# Patient Record
Sex: MALE | Race: WHITE | Employment: FULL TIME | URBAN - METROPOLITAN AREA
[De-identification: names, ages, dates, MRNs, and addresses within clinical notes are randomized per-mention and may not be internally consistent; named-entity substitution may affect disease eponyms.]

---

## 2019-04-29 ENCOUNTER — HOSPITAL ENCOUNTER (OUTPATIENT)
Dept: LAB | Age: 59
Discharge: HOME | End: 2019-04-29

## 2019-04-29 LAB — XX-LABCORP SPECIMEN COL,LCBCF: NORMAL

## 2019-04-29 PROCEDURE — 99001 SPECIMEN HANDLING PT-LAB: CPT

## 2019-05-05 ENCOUNTER — HOSPITAL ENCOUNTER (OUTPATIENT)
Dept: LAB | Age: 59
Discharge: HOME | End: 2019-05-05
Payer: COMMERCIAL

## 2019-05-05 LAB
ANION GAP SERPL CALC-SCNC: 10 MMOL/L (ref 3–18)
BASOPHILS # BLD: 0 K/UL (ref 0–0.1)
BASOPHILS NFR BLD: 1 % (ref 0–2)
BUN SERPL-MCNC: 17 MG/DL (ref 7–18)
BUN/CREAT SERPL: 14 (ref 12–20)
CALCIUM SERPL-MCNC: 8.8 MG/DL (ref 8.5–10.1)
CHLORIDE SERPL-SCNC: 105 MMOL/L (ref 100–108)
CO2 SERPL-SCNC: 24 MMOL/L (ref 21–32)
CREAT SERPL-MCNC: 1.22 MG/DL (ref 0.6–1.3)
DIFFERENTIAL METHOD BLD: ABNORMAL
EOSINOPHIL # BLD: 0.3 K/UL (ref 0–0.4)
EOSINOPHIL NFR BLD: 4 % (ref 0–5)
ERYTHROCYTE [DISTWIDTH] IN BLOOD BY AUTOMATED COUNT: 14.7 % (ref 11.6–14.5)
ERYTHROCYTE [SEDIMENTATION RATE] IN BLOOD: 1 MM/HR (ref 0–20)
GLUCOSE SERPL-MCNC: 370 MG/DL (ref 74–99)
HCT VFR BLD AUTO: 41.8 % (ref 36–48)
HGB BLD-MCNC: 13.2 G/DL (ref 13–16)
LYMPHOCYTES # BLD: 1.3 K/UL (ref 0.9–3.6)
LYMPHOCYTES NFR BLD: 16 % (ref 21–52)
MCH RBC QN AUTO: 28.4 PG (ref 24–34)
MCHC RBC AUTO-ENTMCNC: 31.6 G/DL (ref 31–37)
MCV RBC AUTO: 89.9 FL (ref 74–97)
MONOCYTES # BLD: 0.5 K/UL (ref 0.05–1.2)
MONOCYTES NFR BLD: 6 % (ref 3–10)
NEUTS SEG # BLD: 6.2 K/UL (ref 1.8–8)
NEUTS SEG NFR BLD: 73 % (ref 40–73)
PLATELET # BLD AUTO: 212 K/UL (ref 135–420)
PMV BLD AUTO: 11.2 FL (ref 9.2–11.8)
POTASSIUM SERPL-SCNC: 4.1 MMOL/L (ref 3.5–5.5)
RBC # BLD AUTO: 4.65 M/UL (ref 4.7–5.5)
SODIUM SERPL-SCNC: 139 MMOL/L (ref 136–145)
WBC # BLD AUTO: 8.3 K/UL (ref 4.6–13.2)

## 2019-05-05 PROCEDURE — 85652 RBC SED RATE AUTOMATED: CPT

## 2019-05-05 PROCEDURE — 80048 BASIC METABOLIC PNL TOTAL CA: CPT

## 2019-05-05 PROCEDURE — 85025 COMPLETE CBC W/AUTO DIFF WBC: CPT

## 2019-05-06 LAB
FAX TO INFO,FAXT: NORMAL
FAX TO NUMBER,FAXN: NORMAL

## 2019-05-12 ENCOUNTER — HOSPITAL ENCOUNTER (OUTPATIENT)
Dept: LAB | Age: 59
Discharge: HOME | End: 2019-05-12

## 2019-05-12 LAB — XX-LABCORP SPECIMEN COL,LCBCF: NORMAL

## 2019-05-12 PROCEDURE — 99001 SPECIMEN HANDLING PT-LAB: CPT

## 2019-05-26 ENCOUNTER — HOSPITAL ENCOUNTER (OUTPATIENT)
Dept: LAB | Age: 59
Discharge: HOME | End: 2019-05-26
Payer: COMMERCIAL

## 2019-05-26 LAB
ANION GAP SERPL CALC-SCNC: 10 MMOL/L (ref 3–18)
BASOPHILS # BLD: 0 K/UL (ref 0–0.1)
BASOPHILS NFR BLD: 0 % (ref 0–2)
BUN SERPL-MCNC: 24 MG/DL (ref 7–18)
BUN/CREAT SERPL: 19 (ref 12–20)
CALCIUM SERPL-MCNC: 8.7 MG/DL (ref 8.5–10.1)
CHLORIDE SERPL-SCNC: 103 MMOL/L (ref 100–108)
CO2 SERPL-SCNC: 26 MMOL/L (ref 21–32)
CREAT SERPL-MCNC: 1.28 MG/DL (ref 0.6–1.3)
DIFFERENTIAL METHOD BLD: ABNORMAL
EOSINOPHIL # BLD: 0.5 K/UL (ref 0–0.4)
EOSINOPHIL NFR BLD: 6 % (ref 0–5)
ERYTHROCYTE [DISTWIDTH] IN BLOOD BY AUTOMATED COUNT: 14.4 % (ref 11.6–14.5)
ERYTHROCYTE [SEDIMENTATION RATE] IN BLOOD: 8 MM/HR (ref 0–20)
GLUCOSE SERPL-MCNC: 256 MG/DL (ref 74–99)
HCT VFR BLD AUTO: 40.7 % (ref 36–48)
HGB BLD-MCNC: 13 G/DL (ref 13–16)
LYMPHOCYTES # BLD: 1.3 K/UL (ref 0.9–3.6)
LYMPHOCYTES NFR BLD: 16 % (ref 21–52)
MCH RBC QN AUTO: 27.9 PG (ref 24–34)
MCHC RBC AUTO-ENTMCNC: 31.9 G/DL (ref 31–37)
MCV RBC AUTO: 87.3 FL (ref 74–97)
MONOCYTES # BLD: 0.5 K/UL (ref 0.05–1.2)
MONOCYTES NFR BLD: 6 % (ref 3–10)
NEUTS SEG # BLD: 5.9 K/UL (ref 1.8–8)
NEUTS SEG NFR BLD: 72 % (ref 40–73)
PLATELET # BLD AUTO: 264 K/UL (ref 135–420)
PMV BLD AUTO: 10.4 FL (ref 9.2–11.8)
POTASSIUM SERPL-SCNC: 4.3 MMOL/L (ref 3.5–5.5)
RBC # BLD AUTO: 4.66 M/UL (ref 4.7–5.5)
SODIUM SERPL-SCNC: 139 MMOL/L (ref 136–145)
WBC # BLD AUTO: 8.2 K/UL (ref 4.6–13.2)

## 2019-05-26 PROCEDURE — 85652 RBC SED RATE AUTOMATED: CPT

## 2019-05-26 PROCEDURE — 86140 C-REACTIVE PROTEIN: CPT

## 2019-05-26 PROCEDURE — 80048 BASIC METABOLIC PNL TOTAL CA: CPT

## 2019-05-26 PROCEDURE — 85025 COMPLETE CBC W/AUTO DIFF WBC: CPT

## 2019-05-27 LAB
FAX TO INFO,FAXT: NORMAL
FAX TO NUMBER,FAXN: NORMAL

## 2019-05-29 LAB — CRP SERPL-MCNC: 0.3 MG/DL (ref 0–0.3)

## 2019-06-02 ENCOUNTER — HOSPITAL ENCOUNTER (OUTPATIENT)
Dept: LAB | Age: 59
Discharge: HOME | End: 2019-06-02
Payer: COMMERCIAL

## 2019-06-02 PROCEDURE — 80048 BASIC METABOLIC PNL TOTAL CA: CPT

## 2019-06-02 PROCEDURE — 86140 C-REACTIVE PROTEIN: CPT

## 2019-06-02 PROCEDURE — 85025 COMPLETE CBC W/AUTO DIFF WBC: CPT

## 2019-06-02 PROCEDURE — 85652 RBC SED RATE AUTOMATED: CPT

## 2019-06-03 LAB
ANION GAP SERPL CALC-SCNC: 8 MMOL/L (ref 3–18)
BASOPHILS # BLD: 0 K/UL
BASOPHILS NFR BLD: 0 % (ref 0–1)
BUN SERPL-MCNC: 12 MG/DL (ref 7–18)
BUN/CREAT SERPL: 10 (ref 12–20)
CALCIUM SERPL-MCNC: 8.7 MG/DL (ref 8.5–10.1)
CHLORIDE SERPL-SCNC: 106 MMOL/L (ref 100–108)
CO2 SERPL-SCNC: 25 MMOL/L (ref 21–32)
CREAT SERPL-MCNC: 1.17 MG/DL (ref 0.6–1.3)
CRP SERPL-MCNC: <0.3 MG/DL (ref 0–0.3)
EOSINOPHIL # BLD: 0.3 K/UL
EOSINOPHIL NFR BLD: 3 % (ref 0–5)
ERYTHROCYTE [DISTWIDTH] IN BLOOD BY AUTOMATED COUNT: 14.3 % (ref 11.5–14.5)
ERYTHROCYTE [SEDIMENTATION RATE] IN BLOOD: 5 MM/HR (ref 0–20)
GLUCOSE SERPL-MCNC: 168 MG/DL (ref 74–99)
HCT VFR BLD AUTO: 41.7 %
HGB BLD-MCNC: 13.6 G/DL (ref 13–16)
LYMPHOCYTES # BLD: 1.5 K/UL
LYMPHOCYTES NFR BLD: 15 % (ref 12–49)
MCH RBC QN AUTO: 28 PG (ref 25–35)
MCHC RBC AUTO-ENTMCNC: 32.6 G/DL (ref 31–37)
MCV RBC AUTO: 85.8 FL (ref 78–98)
MONOCYTES # BLD: 0.4 K/UL
MONOCYTES NFR BLD: 4 % (ref 5–13)
NEUTS SEG # BLD: 7.6 K/UL
NEUTS SEG NFR BLD: 77 % (ref 42–75)
PLATELET # BLD AUTO: 241 K/UL
PMV BLD AUTO: 11.1 FL (ref 7.4–10.4)
POTASSIUM SERPL-SCNC: 4.4 MMOL/L (ref 3.5–5.5)
RBC # BLD AUTO: 4.86 M/UL
SODIUM SERPL-SCNC: 139 MMOL/L (ref 136–145)
WBC # BLD AUTO: 9.8 K/UL

## 2019-06-04 LAB
FAX TO INFO,FAXT: NORMAL
FAX TO NUMBER,FAXN: NORMAL

## 2019-09-03 ENCOUNTER — HOSPITAL ENCOUNTER (OUTPATIENT)
Dept: GENERAL RADIOLOGY | Age: 59
Discharge: HOME | End: 2019-09-03
Attending: PODIATRIST
Payer: COMMERCIAL

## 2019-09-03 DIAGNOSIS — M86.9 DIABETIC FOOT ULCER WITH OSTEOMYELITIS (HCC): ICD-10-CM

## 2019-09-03 DIAGNOSIS — E11.69 DIABETIC FOOT ULCER WITH OSTEOMYELITIS (HCC): ICD-10-CM

## 2019-09-03 DIAGNOSIS — L97.509 DIABETIC FOOT ULCER WITH OSTEOMYELITIS (HCC): ICD-10-CM

## 2019-09-03 DIAGNOSIS — E11.621 DIABETIC FOOT ULCER WITH OSTEOMYELITIS (HCC): ICD-10-CM

## 2019-09-03 PROCEDURE — 73630 X-RAY EXAM OF FOOT: CPT

## 2019-09-23 ENCOUNTER — HOSPITAL ENCOUNTER (OUTPATIENT)
Dept: MRI IMAGING | Age: 59
Discharge: HOME | End: 2019-09-23
Attending: PODIATRIST
Payer: COMMERCIAL

## 2019-09-23 DIAGNOSIS — L08.9 LEFT FOOT INFECTION: ICD-10-CM

## 2019-09-23 PROCEDURE — 74011250636 HC RX REV CODE- 250/636: Performed by: PODIATRIST

## 2019-09-23 PROCEDURE — A9575 INJ GADOTERATE MEGLUMI 0.1ML: HCPCS | Performed by: PODIATRIST

## 2019-09-23 PROCEDURE — 73720 MRI LWR EXTREMITY W/O&W/DYE: CPT

## 2019-09-23 RX ORDER — GADOTERATE MEGLUMINE 376.9 MG/ML
14 INJECTION INTRAVENOUS
Status: COMPLETED | OUTPATIENT
Start: 2019-09-23 | End: 2019-09-23

## 2019-09-23 RX ADMIN — GADOTERATE MEGLUMINE 14 ML: 376.9 INJECTION INTRAVENOUS at 17:10

## 2020-01-09 NOTE — PROGRESS NOTES
Assessment and Plan   Diagnoses and all orders for this visit:    1. Controlled type 2 diabetes mellitus without complication, without long-term current use of insulin (HCC)  -     HEMOGLOBIN A1C WITH EAG; Future  -     LIPID PANEL; Future  -     glipiZIDE SR (GLUCOTROL XL) 10 mg CR tablet; Take 1 Tab by mouth daily for 90 days. -     atorvastatin (LIPITOR) 40 mg tablet; Take 1 Tab by mouth daily for 90 days. -     metFORMIN (GLUCOPHAGE) 1,000 mg tablet; Take 1 Tab by mouth two (2) times daily (with meals) for 90 days.  -     gabapentin (NEURONTIN) 100 mg capsule; Take 1 Cap by mouth daily for 90 days. Max Daily Amount: 100 mg. Refill provided. Will need urine test at next visit. Discussed good foot care. 2. Need for hepatitis C screening test  -     HEPATITIS C AB; Future    3. Routine adult health maintenance  -     CBC WITH AUTOMATED DIFF; Future  Discussed getting the shingles vaccine. Discussed getting colon cancer screening. Patient will think about it    4. Essential hypertension  -     METABOLIC PANEL, COMPREHENSIVE; Future  -     lisinopril (PRINIVIL, ZESTRIL) 10 mg tablet; Take 1 Tab by mouth daily for 90 days. Increase lisinopril to 10 mg    5. Paroxysmal atrial fibrillation (HCC)  -     ECHO ADULT COMPLETE; Future  -     carvedilol (COREG) 12.5 mg tablet; Take 1 Tab by mouth two (2) times daily (with meals) for 90 days. -     apixaban (ELIQUIS) 5 mg tablet; Take 1 Tab by mouth two (2) times a day for 90 days. -     aspirin delayed-release 81 mg tablet; Take 1 Tab by mouth daily for 90 days.  -     AMB POC EKG ROUTINE W/ 12 LEADS, INTER & REP  Unclear cardiac history. Will need to get records from [facility] from his hospitalization in 2017. EKG today sinus rhythm. CHADSVASC 2    6. Erectile dysfunction, unspecified erectile dysfunction type  -     tadalafil (CIALIS) 20 mg tablet; Take 1 Tab by mouth as needed for Erectile Dysfunction for up to 30 days. -     sildenafil, pulm. hypertension, (REVATIO) 20 mg tablet; Take 1 Tab by mouth daily for 30 days. Discussed risk of side effects with dual administration    7. Hyperlipidemia, unspecified hyperlipidemia type  Continue atorvastatin. Check lipid panel    8. Skin ulcer of left foot, limited to breakdown of skin ([address]  Followed by wound care downstairs and vascular surgery. Reports that he will be having a bone biopsy later this month. 9. Chronic right shoulder pain  Continue to monitor    Tobacco use - not interested in quitting at this time    Benefits, risks, possible drug interactions, and side effects of all new medications were reviewed with the patient. Pt verbalized understanding. Return to clinic: 2 months for diabetes, hypertension, follow-up on records    Varinder Rodriguez MD  [facility]  1/10/2020    No future appointments. History of Present Illness   Chief Complaint   [facility] care    Jaime Mao is a 63 y.o. male     A. fibon apixaban and Coreg. Originally diagnosed in January 2017. At that time, was admitted to [facility] for \"fluid on my heart\". Had a cath at that time as well and was told he had a 30% blockage. Was not told that he had heart failure at that time did not follow-up with any cardiologist.  Subsequently was found to have A. fib in March 2019 which is when he was started on medications. Reports occasional palpitations but no lightheadedness or dizziness or chest pain or shortness of breath. Hyperlipidemiacompliant with atorvastatin. Denies any side effects. Wound on his right third toefollowed by wound care downstairs. Will have a bone biopsy later this month. Was seen by Dr. Taye Alcantara with vascular surgery and sounds like he had a clot retrieval on the left leg. Uses gabapentin for neuropathy. Diabetes reports A1c is less than 7%. Currently on metformin and glipizide. Hypertensionon lisinopril.   Does not take his blood pressure at home.    Erectile dysfunctiontakes both tadalafil and sildenafil at the same time. Denies any side effects from combination. Right shoulder painongoing for the past month, intermittent, dull. Goes away with stretches. Review of Systems   Constitutional: Negative for chills and fever. HENT: Positive for hearing loss. Eyes: Negative for blurred vision. Respiratory: Negative for shortness of breath. Cardiovascular: Negative for chest pain. Gastrointestinal: Negative for abdominal pain, blood in stool, constipation, diarrhea, melena, nausea and vomiting. Genitourinary: Negative for dysuria and hematuria. Musculoskeletal: Negative for joint pain. Skin: Negative for rash. Neurological: Negative for headaches. Past Medical History   No Known Allergies     Current Outpatient Medications   Medication Sig    multivitamin (ONE A DAY) tablet Take 1 Tab by mouth daily.  lisinopril (PRINIVIL, ZESTRIL) 10 mg tablet Take 1 Tab by mouth daily for 90 days.  glipiZIDE SR (GLUCOTROL XL) 10 mg CR tablet Take 1 Tab by mouth daily for 90 days.  atorvastatin (LIPITOR) 40 mg tablet Take 1 Tab by mouth daily for 90 days.  carvedilol (COREG) 12.5 mg tablet Take 1 Tab by mouth two (2) times daily (with meals) for 90 days.  tadalafil (CIALIS) 20 mg tablet Take 1 Tab by mouth as needed for Erectile Dysfunction for up to 30 days.  sildenafil, pulm. hypertension, (REVATIO) 20 mg tablet Take 1 Tab by mouth daily for 30 days.  metFORMIN (GLUCOPHAGE) 1,000 mg tablet Take 1 Tab by mouth two (2) times daily (with meals) for 90 days.  gabapentin (NEURONTIN) 100 mg capsule Take 1 Cap by mouth daily for 90 days. Max Daily Amount: 100 mg.  apixaban (ELIQUIS) 5 mg tablet Take 1 Tab by mouth two (2) times a day for 90 days.  aspirin delayed-release 81 mg tablet Take 1 Tab by mouth daily for 90 days.  buPROPion SR (WELLBUTRIN SR) 150 mg SR tablet Take  by mouth two (2) times a day.      No current facility-administered medications for this visit. Patient Active Problem List   Diagnosis Code    Controlled type 2 diabetes mellitus without complication, without long-term current use of insulin ([facility]) E11.9    Paroxysmal atrial fibrillation ([facility]) I48.0    Essential hypertension I10    Erectile dysfunction, unspecified erectile dysfunction type N52.9    Hyperlipidemia, unspecified hyperlipidemia type E78.5    Skin ulcer of left foot, limited to breakdown of [facility] L97.521     History reviewed. No pertinent surgical history. Social History     Tobacco Use    Smoking status: Current Every Day Smoker    Smokeless tobacco: Never Used    Tobacco comment: 1.5 pack    Substance Use Topics    Alcohol use: Not Currently     Comment: quit 45 years       Family History   Problem Relation Age of Onset    Heart Disease Mother         triple bypass     Cancer Sister     Heart Disease Sister     Diabetes Brother         Physical Exam   Vitals:       Visit Vitals  /84 (BP 1 Location: Left arm, BP Patient Position: Sitting)   Pulse 85   Temp 98.3 °F (36.8 °C) (Oral)   Resp 18   Ht 5' 9\" (1.753 m)   Wt 182 lb (82.6 kg)   SpO2 94%   BMI 26.88 kg/m²        Physical Exam  Constitutional:       General: He is not in acute distress. Appearance: He is well-developed. HENT:      Left Ear: Tympanic membrane, ear canal and external ear normal.      Ears:      Comments: Abnormal right external ear canal     Nose: Nose normal.      Mouth/Throat:      Mouth: Mucous membranes are moist.      Pharynx: No posterior oropharyngeal erythema. Eyes:      Conjunctiva/sclera: Conjunctivae normal.      Pupils: Pupils are equal, round, and reactive to light. Neck:      Musculoskeletal: Neck supple. Cardiovascular:      Rate and Rhythm: Normal rate and regular rhythm. Pulses:           Dorsalis pedis pulses are 0 on the right side and 0 on the left side.         Posterior tibial pulses are 0 on the right side and 0 on the left side. Heart sounds: No murmur. No friction rub. No gallop. Pulmonary:      Effort: No respiratory distress. Breath sounds: No wheezing or rales. Abdominal:      General: Bowel sounds are normal. There is no distension. Palpations: Abdomen is soft. There is no hepatomegaly, splenomegaly or mass. Tenderness: There is no tenderness. Hernia: No hernia is present. Musculoskeletal:      Right foot: Normal range of motion. Bunion present. Left foot: Normal range of motion. Feet:      Right foot:      Protective Sensation: 4 sites tested. 3 sites sensed. Skin integrity: Ulcer present. Toenail Condition: Fungal disease present. Left foot:      Protective Sensation: 4 sites tested. 2 sites sensed. Skin integrity: Skin integrity normal.      Toenail Condition: Fungal disease present. Skin:     General: Skin is warm. Findings: No rash. Comments: Left foot third toe ulceration   Neurological:      Mental Status: He is alert. Psychiatric:         Mood and Affect: Mood normal.         Thought Content:  Thought content normal.         Judgment: Judgment normal.          Voice recognition software is utilized and this note may contain transcription errors

## 2020-01-10 ENCOUNTER — HOSPITAL ENCOUNTER (OUTPATIENT)
Dept: LAB | Age: 60
Discharge: HOME | End: 2020-01-10

## 2020-01-10 ENCOUNTER — OFFICE VISIT (OUTPATIENT)
Dept: INTERNAL MEDICINE CLINIC | Age: 60
End: 2020-01-10

## 2020-01-10 VITALS
SYSTOLIC BLOOD PRESSURE: 162 MMHG | DIASTOLIC BLOOD PRESSURE: 84 MMHG | RESPIRATION RATE: 18 BRPM | HEART RATE: 85 BPM | TEMPERATURE: 98.3 F | WEIGHT: 182 LBS | OXYGEN SATURATION: 94 % | BODY MASS INDEX: 26.96 KG/M2 | HEIGHT: 69 IN

## 2020-01-10 DIAGNOSIS — L97.521 SKIN ULCER OF LEFT FOOT, LIMITED TO BREAKDOWN OF SKIN (HCC): ICD-10-CM

## 2020-01-10 DIAGNOSIS — Z11.59 NEED FOR HEPATITIS C SCREENING TEST: ICD-10-CM

## 2020-01-10 DIAGNOSIS — E11.9 CONTROLLED TYPE 2 DIABETES MELLITUS WITHOUT COMPLICATION, WITHOUT LONG-TERM CURRENT USE OF INSULIN (HCC): Primary | ICD-10-CM

## 2020-01-10 DIAGNOSIS — I48.0 PAROXYSMAL ATRIAL FIBRILLATION (HCC): ICD-10-CM

## 2020-01-10 DIAGNOSIS — M25.511 CHRONIC RIGHT SHOULDER PAIN: ICD-10-CM

## 2020-01-10 DIAGNOSIS — E78.5 HYPERLIPIDEMIA, UNSPECIFIED HYPERLIPIDEMIA TYPE: ICD-10-CM

## 2020-01-10 DIAGNOSIS — Z00.00 ROUTINE ADULT HEALTH MAINTENANCE: ICD-10-CM

## 2020-01-10 DIAGNOSIS — I10 ESSENTIAL HYPERTENSION: ICD-10-CM

## 2020-01-10 DIAGNOSIS — E11.9 CONTROLLED TYPE 2 DIABETES MELLITUS WITHOUT COMPLICATION, WITHOUT LONG-TERM CURRENT USE OF INSULIN (HCC): ICD-10-CM

## 2020-01-10 DIAGNOSIS — Z23 ENCOUNTER FOR IMMUNIZATION: ICD-10-CM

## 2020-01-10 DIAGNOSIS — N52.9 ERECTILE DYSFUNCTION, UNSPECIFIED ERECTILE DYSFUNCTION TYPE: ICD-10-CM

## 2020-01-10 DIAGNOSIS — G89.29 CHRONIC RIGHT SHOULDER PAIN: ICD-10-CM

## 2020-01-10 PROBLEM — Z72.0 TOBACCO USE: Status: ACTIVE | Noted: 2020-01-10

## 2020-01-10 LAB
ALBUMIN SERPL-MCNC: 4.2 G/DL (ref 3.5–5)
ALBUMIN/GLOB SERPL: 1.6 {RATIO} (ref 1.1–2.2)
ALP SERPL-CCNC: 70 U/L (ref 45–117)
ALT SERPL-CCNC: 29 U/L (ref 12–78)
ANION GAP SERPL CALC-SCNC: 6 MMOL/L (ref 5–15)
AST SERPL-CCNC: 16 U/L (ref 15–37)
BASOPHILS # BLD: 0 K/UL (ref 0–0.1)
BASOPHILS NFR BLD: 1 % (ref 0–1)
BILIRUB SERPL-MCNC: 0.6 MG/DL (ref 0.2–1)
BUN SERPL-MCNC: 16 MG/DL (ref 6–20)
BUN/CREAT SERPL: 13 (ref 12–20)
CALCIUM SERPL-MCNC: 9.6 MG/DL (ref 8.5–10.1)
CHLORIDE SERPL-SCNC: 109 MMOL/L (ref 97–108)
CHOLEST SERPL-MCNC: 110 MG/DL
CO2 SERPL-SCNC: 25 MMOL/L (ref 21–32)
CREAT SERPL-MCNC: 1.24 MG/DL (ref 0.7–1.3)
DIFFERENTIAL METHOD BLD: NORMAL
EOSINOPHIL # BLD: 0.3 K/UL (ref 0–0.4)
EOSINOPHIL NFR BLD: 3 % (ref 0–7)
ERYTHROCYTE [DISTWIDTH] IN BLOOD BY AUTOMATED COUNT: 13.1 % (ref 11.5–14.5)
EST. AVERAGE GLUCOSE BLD GHB EST-MCNC: 183 MG/DL
GLOBULIN SER CALC-MCNC: 2.7 G/DL (ref 2–4)
GLUCOSE SERPL-MCNC: 68 MG/DL (ref 65–100)
HBA1C MFR BLD: 8 % (ref 4–5.6)
HCT VFR BLD AUTO: 46.1 % (ref 36.6–50.3)
HDLC SERPL-MCNC: 35 MG/DL
HDLC SERPL: 3.1 {RATIO} (ref 0–5)
HGB BLD-MCNC: 14.8 G/DL (ref 12.1–17)
IMM GRANULOCYTES # BLD AUTO: 0 K/UL (ref 0–0.04)
IMM GRANULOCYTES NFR BLD AUTO: 0 % (ref 0–0.5)
LDLC SERPL CALC-MCNC: 49.6 MG/DL (ref 0–100)
LIPID PROFILE,FLP: NORMAL
LYMPHOCYTES # BLD: 2 K/UL (ref 0.8–3.5)
LYMPHOCYTES NFR BLD: 24 % (ref 12–49)
MCH RBC QN AUTO: 28.4 PG (ref 26–34)
MCHC RBC AUTO-ENTMCNC: 32.1 G/DL (ref 30–36.5)
MCV RBC AUTO: 88.3 FL (ref 80–99)
MONOCYTES # BLD: 0.6 K/UL (ref 0–1)
MONOCYTES NFR BLD: 7 % (ref 5–13)
NEUTS SEG # BLD: 5.3 K/UL (ref 1.8–8)
NEUTS SEG NFR BLD: 65 % (ref 32–75)
NRBC # BLD: 0 K/UL (ref 0–0.01)
NRBC BLD-RTO: 0 PER 100 WBC
PLATELET # BLD AUTO: 214 K/UL (ref 150–400)
PMV BLD AUTO: 12.2 FL (ref 8.9–12.9)
POTASSIUM SERPL-SCNC: 4.6 MMOL/L (ref 3.5–5.1)
PROT SERPL-MCNC: 6.9 G/DL (ref 6.4–8.2)
RBC # BLD AUTO: 5.22 M/UL (ref 4.1–5.7)
SODIUM SERPL-SCNC: 140 MMOL/L (ref 136–145)
TRIGL SERPL-MCNC: 127 MG/DL (ref ?–150)
VLDLC SERPL CALC-MCNC: 25.4 MG/DL
WBC # BLD AUTO: 8.2 K/UL (ref 4.1–11.1)

## 2020-01-10 RX ORDER — SILDENAFIL CITRATE 20 MG/1
20 TABLET ORAL DAILY
Qty: 15 TAB | Refills: 0 | Status: SHIPPED | OUTPATIENT
Start: 2020-01-10 | End: 2020-08-27 | Stop reason: SDUPTHER

## 2020-01-10 RX ORDER — SILDENAFIL CITRATE 20 MG/1
20 TABLET ORAL DAILY
COMMUNITY
End: 2020-01-10 | Stop reason: SDUPTHER

## 2020-01-10 RX ORDER — BUPROPION HYDROCHLORIDE 150 MG/1
TABLET, EXTENDED RELEASE ORAL 2 TIMES DAILY
COMMUNITY
End: 2020-03-23 | Stop reason: SDUPTHER

## 2020-01-10 RX ORDER — ASPIRIN 81 MG/1
81 TABLET ORAL DAILY
Qty: 90 TAB | Refills: 0 | Status: SHIPPED | OUTPATIENT
Start: 2020-01-10 | End: 2020-03-23 | Stop reason: SDUPTHER

## 2020-01-10 RX ORDER — TADALAFIL 20 MG/1
20 TABLET ORAL AS NEEDED
Qty: 15 TAB | Refills: 0 | Status: SHIPPED | OUTPATIENT
Start: 2020-01-10 | End: 2020-06-18

## 2020-01-10 RX ORDER — CARVEDILOL 12.5 MG/1
12.5 TABLET ORAL 2 TIMES DAILY WITH MEALS
COMMUNITY
Start: 2019-12-15 | End: 2020-01-10 | Stop reason: SDUPTHER

## 2020-01-10 RX ORDER — GABAPENTIN 300 MG/1
300 CAPSULE ORAL
COMMUNITY
End: 2020-01-10

## 2020-01-10 RX ORDER — GABAPENTIN 100 MG/1
200 CAPSULE ORAL DAILY
COMMUNITY
End: 2020-01-10 | Stop reason: SDUPTHER

## 2020-01-10 RX ORDER — ASPIRIN 81 MG/1
TABLET ORAL DAILY
COMMUNITY
End: 2020-01-10 | Stop reason: SDUPTHER

## 2020-01-10 RX ORDER — LISINOPRIL 10 MG/1
10 TABLET ORAL DAILY
Qty: 90 TAB | Refills: 0 | Status: SHIPPED | OUTPATIENT
Start: 2020-01-10 | End: 2020-03-23 | Stop reason: SDUPTHER

## 2020-01-10 RX ORDER — GLIPIZIDE 10 MG/1
TABLET, FILM COATED, EXTENDED RELEASE ORAL
COMMUNITY
Start: 2019-12-26 | End: 2020-01-10 | Stop reason: SDUPTHER

## 2020-01-10 RX ORDER — LISINOPRIL 5 MG/1
TABLET ORAL DAILY
COMMUNITY
End: 2020-01-10

## 2020-01-10 RX ORDER — METFORMIN HYDROCHLORIDE 1000 MG/1
1000 TABLET ORAL 2 TIMES DAILY WITH MEALS
COMMUNITY
End: 2020-01-10 | Stop reason: SDUPTHER

## 2020-01-10 RX ORDER — TADALAFIL 20 MG/1
20 TABLET ORAL AS NEEDED
COMMUNITY
End: 2020-01-10 | Stop reason: SDUPTHER

## 2020-01-10 RX ORDER — METFORMIN HYDROCHLORIDE 1000 MG/1
1000 TABLET ORAL 2 TIMES DAILY WITH MEALS
Qty: 180 TAB | Refills: 0 | Status: SHIPPED | OUTPATIENT
Start: 2020-01-10 | End: 2020-03-23 | Stop reason: SDUPTHER

## 2020-01-10 RX ORDER — GLIPIZIDE 10 MG/1
10 TABLET, FILM COATED, EXTENDED RELEASE ORAL DAILY
Qty: 90 TAB | Refills: 0 | Status: SHIPPED | OUTPATIENT
Start: 2020-01-10 | End: 2020-03-23 | Stop reason: SDUPTHER

## 2020-01-10 RX ORDER — BISMUTH SUBSALICYLATE 262 MG
1 TABLET,CHEWABLE ORAL DAILY
COMMUNITY

## 2020-01-10 RX ORDER — GABAPENTIN 100 MG/1
100 CAPSULE ORAL DAILY
Qty: 90 CAP | Refills: 0 | Status: SHIPPED | OUTPATIENT
Start: 2020-01-10 | End: 2020-01-14 | Stop reason: SDUPTHER

## 2020-01-10 RX ORDER — ATORVASTATIN CALCIUM 40 MG/1
40 TABLET, FILM COATED ORAL DAILY
COMMUNITY
Start: 2019-12-15 | End: 2020-01-10 | Stop reason: SDUPTHER

## 2020-01-10 RX ORDER — CARVEDILOL 12.5 MG/1
12.5 TABLET ORAL 2 TIMES DAILY WITH MEALS
Qty: 180 TAB | Refills: 0 | Status: SHIPPED | OUTPATIENT
Start: 2020-01-10 | End: 2020-03-23 | Stop reason: SDUPTHER

## 2020-01-10 RX ORDER — ATORVASTATIN CALCIUM 40 MG/1
40 TABLET, FILM COATED ORAL DAILY
Qty: 90 TAB | Refills: 0 | Status: SHIPPED | OUTPATIENT
Start: 2020-01-10 | End: 2020-03-23 | Stop reason: SDUPTHER

## 2020-01-10 NOTE — PROGRESS NOTES
Gasper Brunson is a 63 y.o. male who presents for routine immunizations. He denies any symptoms , reactions or allergies that would exclude them from being immunized today. Risks and adverse reactions were discussed and the VIS was given to them. All questions were addressed. There were no reactions observed.     Compa Bro LPN

## 2020-01-10 NOTE — PATIENT INSTRUCTIONS
Vaccine Information Statement    Influenza (Flu) Vaccine (Inactivated or Recombinant): What You Need to Know    Many Vaccine Information Statements are available in [place] and other languages. See www.immunize.org/vis  Hojas de información sobre vacunas están disponibles en español y en muchos otros idiomas. Visite www.immunize.org/vis    1. Why get vaccinated? Influenza vaccine can prevent influenza (flu). Flu is a contagious disease that spreads around the [facility] every year, usually between October and May. Anyone can get the flu, but it is more dangerous for some people. Infants and young children, people 72years of age and older, pregnant women, and people with certain health conditions or a weakened immune system are at greatest risk of flu complications. Pneumonia, bronchitis, sinus infections and ear infections are examples of flu-related complications. If you have a medical condition, such as heart disease, cancer or diabetes, flu can make it worse. Flu can cause fever and chills, sore throat, muscle aches, fatigue, cough, headache, and runny or stuffy nose. Some people may have vomiting and diarrhea, though this is more common in children than adults. Each year thousands of people in the [facility] die from flu, and many more are hospitalized. Flu vaccine prevents millions of illnesses and flu-related visits to the doctor each year. 2. Influenza vaccines     CDC recommends everyone 10months of age and older get vaccinated every flu season. Children 6 months through 6years of age may need 2 doses during a single flu season. Everyone else needs only 1 dose each flu season. It takes about 2 weeks for protection to develop after vaccination. There are many flu viruses, and they are always changing. Each year a new flu vaccine is made to protect against three or four viruses that are likely to cause disease in the upcoming flu season.  Even when the vaccine doesnt exactly match these viruses, it may still provide some protection. Influenza vaccine does not cause flu. Influenza vaccine may be given at the same time as other vaccines. 3. Talk with your health care provider    Tell your vaccine provider if the person getting the vaccine:   Has had an allergic reaction after a previous dose of influenza vaccine, or has any severe, life-threatening allergies.  Has ever had Guillain-Barré Syndrome (also called GBS). In some cases, your health care provider may decide to postpone influenza vaccination to a future visit. People with minor illnesses, such as a cold, may be vaccinated. People who are moderately or severely ill should usually wait until they recover before getting influenza vaccine. Your health care provider can give you more information. 4. Risks of a reaction     Soreness, redness, and swelling where shot is given, fever, muscle aches, and headache can happen after influenza vaccine.  There may be a very small increased risk of Guillain-Barré Syndrome (GBS) after inactivated influenza vaccine (the flu shot). Sonda Emperor children who get the flu shot along with pneumococcal vaccine (PCV13), and/or DTaP vaccine at the same time might be slightly more likely to have a seizure caused by fever. Tell your health care provider if a child who is getting flu vaccine has ever had a seizure. People sometimes faint after medical procedures, including vaccination. Tell your provider if you feel dizzy or have vision changes or ringing in the ears. As with any medicine, there is a very remote chance of a vaccine causing a severe allergic reaction, other serious injury, or death. 5. What if there is a serious problem? An allergic reaction could occur after the vaccinated person leaves the clinic.  If you see signs of a severe allergic reaction (hives, swelling of the face and throat, difficulty breathing, a fast heartbeat, dizziness, or weakness), call 9-1-1 and get the person to the nearest hospital.    For other signs that concern you, call your health care provider. Adverse reactions should be reported to the Vaccine Adverse Event Reporting System (VAERS). Your health care provider will usually file this report, or you can do it yourself. Visit the VAERS website at www.vaers. [facility].gov or call 8-296.737.5579. VAERS is only for reporting reactions, and VAERS staff do not give medical advice. 6. The National Vaccine Injury Compensation Program    The [facility] Vaccine Injury Compensation Program (VICP) is a federal program that was created to compensate people who may have been injured by certain vaccines. Visit the VICP website at www.[facility].gov/vaccinecompensation or call 2-780.871.7604 to learn about the program and about filing a claim. There is a time limit to file a claim for compensation. 7. How can I learn more?  Ask your health care provider.  Call your local or state health department.  Contact the Centers for Disease Control and Prevention (CDC):  - Call 8-213.947.1451 (0-977-ODU-INFO) or  - Visit CDCs influenza website at www.cdc.gov/flu    Vaccine Information Statement (Interim)  Inactivated Influenza Vaccine   8/15/2019  [id number]   Department of Health and Human Services  Centers for Disease Control and Prevention    Office Use Only      Vaccine Information Statement    Pneumococcal Polysaccharide Vaccine (PPSV23): What You Need to Know    Many Vaccine Information Statements are available in [place]men and other languages. See www.immunize.org/vis  Hojas de información sobre vacunas están disponibles en español y en muchos otros idiomas. Visite www.immunize.org/vis    1. Why get vaccinated? Pneumococcal polysaccharide vaccine (PPSV23) can prevent pneumococcal disease. Pneumococcal disease refers to any illness caused by pneumococcal bacteria.  These bacteria can cause many types of illnesses, including pneumonia, which is an infection of the lungs. Pneumococcal bacteria are one of the most common causes of pneumonia. Besides pneumonia, pneumococcal bacteria can also cause:   Ear infections   Sinus infections   Meningitis (infection of the tissue covering the brain and spinal cord)   Bacteremia (bloodstream infection)    Anyone can get pneumococcal disease, but children under 3years of age, people with certain medical conditions, adults 72 years or older, and cigarette smokers are at the highest risk. Most pneumococcal infections are mild. However, some can result in long-term problems, such as brain damage or hearing loss. Meningitis, bacteremia, and pneumonia caused by pneumococcal disease can be fatal.     2. PPSV23     PPSV23 protects against 23 types of bacteria that cause pneumococcal disease. PPSV23 is recommended for:   All adults 72 years or older,   Anyone 2 years or older with certain medical conditions that can lead to an increased risk for pneumococcal disease. Most people need only one dose of PPSV23. A second dose of PPSV23, and another type of pneumococcal vaccine called PCV13, are recommended for certain high-risk groups. Your health care provider can give you more information. People 65 years or older should get a dose of PPSV23 even if they have already gotten one or more doses of the vaccine before they turned 72.    3. Talk with your health care provider    Tell your vaccine provider if the person getting the vaccine:   Has had an allergic reaction after a previous dose of PPSV23, or has any severe, life-threatening allergies. In some cases, your health care provider may decide to postpone PPSV23 vaccination to a future visit. People with minor illnesses, such as a cold, may be vaccinated. People who are moderately or severely ill should usually wait until they recover before getting PPSV23. Your health care provider can give you more information.     4. Risks of a vaccine reaction     Redness or pain where the shot is given, feeling tired, fever, or muscle aches can happen after PPSV23. People sometimes faint after medical procedures, including vaccination. Tell your provider if you feel dizzy or have vision changes or ringing in the ears. As with any medicine, there is a very remote chance of a vaccine causing a severe allergic reaction, other serious injury, or death. 5. What if there is a serious problem? An allergic reaction could occur after the vaccinated person leaves the clinic. If you see signs of a severe allergic reaction (hives, swelling of the face and throat, difficulty breathing, a fast heartbeat, dizziness, or weakness), call 9-1-1 and get the person to the nearest hospital.    For other signs that concern you, call your health care provider. Adverse reactions should be reported to the Vaccine Adverse Event Reporting System (VAERS). Your health care provider will usually file this report, or you can do it yourself. Visit the VAERS website at www.vaers. hhs.gov or call 7-718.600.7855. VAERS is only for reporting reactions, and VAERS staff do not give medical advice. 6. How can I learn more?  Ask your health care provider.  Call your local or state health department.    Contact the Centers for Disease Control and Prevention (CDC):  - Call 9-163.724.3993 (1-800-CDC-INFO) or  - Visit CDCs website at www.cdc.gov/vaccines    Vaccine Information Statement   PPSV23   10/30/2019    [facility] and [facility] for Disease Control and Prevention    Office Use Only

## 2020-01-11 LAB
HCV AB SERPL QL IA: NONREACTIVE
HCV COMMENT,HCGAC: NORMAL

## 2020-01-13 DIAGNOSIS — E11.9 CONTROLLED TYPE 2 DIABETES MELLITUS WITHOUT COMPLICATION, WITHOUT LONG-TERM CURRENT USE OF INSULIN (HCC): ICD-10-CM

## 2020-01-13 NOTE — PROGRESS NOTES
Please call the pt and let him know that his hemoglobin A1c is 8%. I recommend that he is closer to 7%. I recommend increasing his glipizide to 20 mg or 2 tablets.   The rest of his labs are normal

## 2020-01-13 NOTE — PROGRESS NOTES
Called patient, ADRIANE verified. Advised his A1c is 8% and the goal is to be closer to 7%. Will re-evaluate alternative medication on the next appointment. In the mean time to work on his diet and exercise. Advised his Lisinopril medication was increase on his last visit and the Coreg is to remain the same. Patient reported his taking two tablets of the 100 mg Gabapentin and requesting a refill.

## 2020-01-14 ENCOUNTER — TELEPHONE (OUTPATIENT)
Dept: INTERNAL MEDICINE CLINIC | Age: 60
End: 2020-01-14

## 2020-01-14 DIAGNOSIS — E11.9 CONTROLLED TYPE 2 DIABETES MELLITUS WITHOUT COMPLICATION, WITHOUT LONG-TERM CURRENT USE OF INSULIN (HCC): ICD-10-CM

## 2020-01-14 RX ORDER — GABAPENTIN 100 MG/1
200 CAPSULE ORAL DAILY
Qty: 180 CAP | Refills: 0 | Status: SHIPPED | OUTPATIENT
Start: 2020-01-14 | End: 2020-03-23 | Stop reason: SDUPTHER

## 2020-03-10 ENCOUNTER — TELEPHONE (OUTPATIENT)
Dept: WOUND CARE | Age: 60
End: 2020-03-10

## 2020-03-10 NOTE — TELEPHONE ENCOUNTER
Attempted to call patient for phone corona virus screen prior to 3/11/2020 appointment. No answer. Left voicemail.

## 2020-03-11 ENCOUNTER — HOSPITAL ENCOUNTER (OUTPATIENT)
Dept: WOUND CARE | Age: 60
Discharge: HOME | End: 2020-03-11
Payer: COMMERCIAL

## 2020-03-11 VITALS
BODY MASS INDEX: 26.66 KG/M2 | WEIGHT: 180 LBS | RESPIRATION RATE: 16 BRPM | SYSTOLIC BLOOD PRESSURE: 124 MMHG | HEIGHT: 69 IN | DIASTOLIC BLOOD PRESSURE: 80 MMHG | HEART RATE: 86 BPM | TEMPERATURE: 98 F

## 2020-03-11 PROCEDURE — 99201 HC NEW PT LEVEL I: CPT

## 2020-03-11 RX ORDER — CIPROFLOXACIN 500 MG/1
500 TABLET ORAL 2 TIMES DAILY
Qty: 28 TAB | Refills: 0 | Status: SHIPPED | OUTPATIENT
Start: 2020-03-11 | End: 2020-03-25

## 2020-03-11 NOTE — CONSULTS
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 S/p 2 weeks of Bactrim already, will finish course with Cipro for two weeks as very susceptible strain of Enterobacter  Patient to continue to follow up with PCP and podiatry. History of Present Illness:      Perla Reyez is a 63 y.o. male who presents with lt third toe chronic non healing wound and chronic osteomyelitis. Per patient he had developed wound 1&1/2 year ago. He was seen by ID at [place] and was given 6 weeks of IV Abx course in June 2019. He does not remember name of that Abx. According to , his small wound was probing to bone. She did bone biopsy and Cx 1/24/20, gave him bactrim PO for 2 weeks after procedure. Surprisingly patient's wound has closed since then. Although he did undergo lt SFA angioplasty. He denies any fever/chills. He has been smoking for last 40 years. His DM is unde good control. Bone biopsy Cx 1/24/20 with pan susceptible Enterobacter, MRI foot Sep/2019 with third toe osteomyelitis.       Past Medical History:   Diagnosis Date    Chronic obstructive pulmonary disease ([address]     Diabetes ([address]     Hypertension      Past Surgical History:   Procedure Laterality Date    VASCULAR SURGERY PROCEDURE UNLIST        Family History   Problem Relation Age of Onset    Heart Disease Mother     Cancer Sister      Social History     Socioeconomic History    Marital status: SINGLE     Spouse name: Not on file    Number of children: Not on file    Years of education: Not on file    Highest education level: Not on file   Tobacco Use    Smoking status: Current Every Day Smoker     Packs/day: 1.50    Smokeless tobacco: Never Used   Substance and Sexual Activity    Alcohol use: Not Currently    Drug use: Not Currently   Other Topics Concern      Current Outpatient Medications   Medication Sig    apixaban (Eliquis) 5 mg tablet Take 5 mg by mouth two (2) times a day.  ciprofloxacin HCl (Cipro) 500 mg tablet Take 1 Tab by mouth two (2) times a day for 14 days. Indications: skin infection due to Enterobacter bacteria     No current facility-administered medications for this encounter. Allergies   Allergen Reactions    Penicillin G Unknown (comments)     When he was a young child Mother told him he was allergic       Review of Systems:  A comprehensive review of systems was negative except for that written in the History of Present Illness. Physical Exam:     VS:   Vitals:    03/11/20 0921   BP: 124/80   Pulse: 86   Resp: 16   Temp: 98 °F (36.7 °C)   Weight: 81.6 kg (180 lb)   Height: 5' 9\" (1.753 m)       General:  in no apparent distress, well developed and well nourished, alert and oriented times 3   Ears: ENT exam normal, no neck nodes or sinus tenderness.    Chest: CTA b/l  Heart: RRR S1S2  Abdomen: soft, NT, ND, BS+   Extremities: right lower extremity extremities normal, atraumatic, no cyanosis or edema, 2+ and symmetric    left lower extremity extremities normal, atraumatic, no cyanosis or edema, 2+ and symmetric  Second and third left toe with callus, NO open wound           Signed By: Grey Cline MD     March 11, 2020

## 2020-03-11 NOTE — DISCHARGE INSTRUCTIONS
Discharge Instructions for  [address]  Telephone: [phone]    NAME:  Deirdre Mitchell  YOB: 1960  MEDICAL RECORD NUMBER:  [id number]  DATE:  3/11/2020      Dietary:  [x] Diet as tolerated: [] Calorie Diabetic Diet: [] No Added Salt:  [x] Increase Protein: [] Other:   Activity:  [x] Activity as tolerated:  [] Patient has no activity restrictions     [] Strict Bedrest: [] Remain off Work:     [] May return to full duty work:                                   [] Return to work with restrictions:             Return Appointment:  [] Wound and dressing supply provider:   [] ECF or Home Healthcare:  [] Wound Assessment: [] Physician or NP scheduled for Wound Assessment:   [x] Return Appointment: as needed  [x] Ordered tests:  Ciprofloxacin prescription     Electronically signed on 3/11/2020 at 9:57 AM     Galina Marvin 281: Should you experience any significant changes in your wound(s) or have questions about your wound care, please contact the [facility] at [address] [place] [address] 8:00 am - 4:30. If you need help with your wound outside these hours and cannot wait until we are again available, contact your PCP or go to the hospital emergency room. PLEASE NOTE: IF YOU ARE UNABLE TO OBTAIN WOUND SUPPLIES, CONTINUE TO USE THE SUPPLIES YOU HAVE AVAILABLE UNTIL YOU ARE ABLE TO REACH US. IT IS MOST IMPORTANT TO KEEP THE WOUND COVERED AT ALL TIMES.       Physician Signature:_______________________    Date: ___________ Time:  ____________

## 2020-03-11 NOTE — WOUND CARE
Visit Vitals /80 (BP 1 Location: Left arm, BP Patient Position: Sitting) Pulse 86 Temp 98 °F (36.7 °C) Resp 16 Ht 5' 9\" (1.753 m) Wt 81.6 kg (180 lb) BMI 26.58 kg/m² Patient has no wounds just coming for ID consult.

## 2020-03-23 ENCOUNTER — TELEPHONE (OUTPATIENT)
Dept: INTERNAL MEDICINE CLINIC | Age: 60
End: 2020-03-23

## 2020-03-23 DIAGNOSIS — E11.9 CONTROLLED TYPE 2 DIABETES MELLITUS WITHOUT COMPLICATION, WITHOUT LONG-TERM CURRENT USE OF INSULIN (HCC): ICD-10-CM

## 2020-03-23 DIAGNOSIS — I48.0 PAROXYSMAL ATRIAL FIBRILLATION (HCC): ICD-10-CM

## 2020-03-23 DIAGNOSIS — I10 ESSENTIAL HYPERTENSION: ICD-10-CM

## 2020-03-23 RX ORDER — ASPIRIN 81 MG/1
81 TABLET ORAL DAILY
Qty: 90 TAB | Refills: 0 | Status: SHIPPED | OUTPATIENT
Start: 2020-03-23 | End: 2020-06-21

## 2020-03-23 RX ORDER — LISINOPRIL 10 MG/1
10 TABLET ORAL DAILY
Qty: 90 TAB | Refills: 0 | Status: SHIPPED | OUTPATIENT
Start: 2020-03-23 | End: 2020-06-21

## 2020-03-23 RX ORDER — GABAPENTIN 100 MG/1
200 CAPSULE ORAL DAILY
Qty: 180 CAP | Refills: 0 | Status: SHIPPED | OUTPATIENT
Start: 2020-03-23 | End: 2020-06-21

## 2020-03-23 RX ORDER — METFORMIN HYDROCHLORIDE 1000 MG/1
1000 TABLET ORAL 2 TIMES DAILY WITH MEALS
Qty: 180 TAB | Refills: 0 | Status: SHIPPED | OUTPATIENT
Start: 2020-03-23 | End: 2020-06-21

## 2020-03-23 RX ORDER — BUPROPION HYDROCHLORIDE 150 MG/1
150 TABLET, EXTENDED RELEASE ORAL 2 TIMES DAILY
Qty: 180 TAB | Refills: 0 | Status: SHIPPED | OUTPATIENT
Start: 2020-03-23 | End: 2020-08-27

## 2020-03-23 RX ORDER — ATORVASTATIN CALCIUM 40 MG/1
40 TABLET, FILM COATED ORAL DAILY
Qty: 90 TAB | Refills: 0 | Status: SHIPPED | OUTPATIENT
Start: 2020-03-23 | End: 2020-06-21

## 2020-03-23 RX ORDER — CARVEDILOL 12.5 MG/1
12.5 TABLET ORAL 2 TIMES DAILY WITH MEALS
Qty: 180 TAB | Refills: 0 | Status: SHIPPED | OUTPATIENT
Start: 2020-03-23 | End: 2020-06-21

## 2020-03-23 RX ORDER — GLIPIZIDE 10 MG/1
10 TABLET, FILM COATED, EXTENDED RELEASE ORAL 2 TIMES DAILY
Qty: 180 TAB | Refills: 0 | Status: SHIPPED | OUTPATIENT
Start: 2020-03-23 | End: 2020-06-21

## 2020-03-23 NOTE — TELEPHONE ENCOUNTER
Pt states Glipizide directions are incorrect. It should be to take it twice a day not once a day. Asked for all his meds to be refilled ([facility]) just incase our office or the pharmacies close due to the [address]. He wants to ensure he does not run out of meds. Thanks.

## 2020-04-06 ENCOUNTER — TELEPHONE (OUTPATIENT)
Dept: INTERNAL MEDICINE CLINIC | Age: 60
End: 2020-04-06

## 2020-04-06 NOTE — TELEPHONE ENCOUNTER
Pt states he has 3 underlining conditions (heart disease, COPD, diabetes) and his job is not taking COVID-19 seriously. Would like to know if PCP can put him on medical leave. Also, would like a call from nurse or PCP to discuss more in detail. Thanks.

## 2020-04-06 NOTE — TELEPHONE ENCOUNTER
Advised patient Dr Chico Perkins can write a note that confirms that he is in a high risk category. However, it would be up to his job what kind of leave they will allow. Advised for him to talk to his HR department and he can fax us any forms they would want filled out. Patient verbalized understanding and will let us know whats required.

## 2020-04-06 NOTE — TELEPHONE ENCOUNTER
Please call the pt and let him know that I can write a note that confirms that he is in a high risk category. However, it would be up to his job what kind of leave they will allow. He should talk to his HR department and he can fax us any forms they would want filled out.

## 2020-04-22 ENCOUNTER — TELEPHONE (OUTPATIENT)
Dept: INTERNAL MEDICINE CLINIC | Age: 60
End: 2020-04-22

## 2020-04-22 NOTE — TELEPHONE ENCOUNTER
----- Message from Tripp Wheatley sent at 4/22/2020  2:46 PM EDT -----  Regarding: Gualberto Otero MD/ telephone  General Message/Vendor Calls    Caller's first and last name: Fatuma Thapa      Reason for call: would like to speak with the nurse in reference to  letter that he needs for work       Callback required yes/no and why:      Best contact number(s):[phone]      Details to clarify the request:      Tripp Wheatley

## 2020-04-23 ENCOUNTER — TELEPHONE (OUTPATIENT)
Dept: INTERNAL MEDICINE CLINIC | Age: 60
End: 2020-04-23

## 2020-04-23 NOTE — TELEPHONE ENCOUNTER
Asked to speak with nurse regarding letter. Tested for COVID-19, waiting on results. Job closed until Monday. Please call pt tomorrow morning. Thanks.

## 2020-04-24 NOTE — TELEPHONE ENCOUNTER
Called patient, advised letter stating his high risk for COVID is ready for . Advised his company is the one to determine how long he can be out for work. Patient verbalized understanding.

## 2020-05-06 DIAGNOSIS — E11.9 CONTROLLED TYPE 2 DIABETES MELLITUS WITHOUT COMPLICATION, WITHOUT LONG-TERM CURRENT USE OF INSULIN (HCC): ICD-10-CM

## 2020-05-06 NOTE — TELEPHONE ENCOUNTER
----- Message from Abelino Díaz sent at 5/6/2020  1:13 PM EDT -----  Regarding: Dr. Corina Bishop (if not patient):      Relationship of caller (if not patient):      Best contact number(s):[phone] Refill    Caller (if not patient):      Relationship of caller (if not patient):      Best contact number(s): [phone]      Name of medication and dosage if known:Gabapentin 300 mgs 90 day supply      Is patient out of this medication (yes/no):yes      Ph[facility] name[facility]    Pharmacy listed in chart? (yes/no):yes  Pharmacy phone number:      Details to clarify the request: Patient is requesting a 90 supply of Gabapentin 300 mgs [facility] to [facility]             Name of medication and dosage if known:      Is patient out of this medication (yes/no):      Pharmacy name:    Pharmacy listed in chart? (yes/no):  Pharmacy phone number:      Details to clarify the request:      Abelino Díaz

## 2020-05-07 DIAGNOSIS — E11.42 TYPE 2 DIABETES MELLITUS WITH DIABETIC POLYNEUROPATHY, WITHOUT LONG-TERM CURRENT USE OF INSULIN (HCC): Primary | ICD-10-CM

## 2020-05-07 RX ORDER — GABAPENTIN 300 MG/1
300 CAPSULE ORAL
Qty: 90 CAP | Refills: 1 | Status: SHIPPED | OUTPATIENT
Start: 2020-05-07 | End: 2020-08-27

## 2020-05-07 RX ORDER — GABAPENTIN 100 MG/1
200 CAPSULE ORAL DAILY
Qty: 180 CAP | Refills: 0 | OUTPATIENT
Start: 2020-05-07 | End: 2020-08-05

## 2020-05-07 NOTE — TELEPHONE ENCOUNTER
Returned patient's call, left a message - We need to clarify what dose and frequency his taking of the Gabapentin before refilling.

## 2020-05-07 NOTE — TELEPHONE ENCOUNTER
Patient returned call. Reported he takes one 300 mg capsule at night and two 100 mg capsule in the morning =200mg in the am.   He already had the 100 mg filled and is requesting 300 mg capsule he takes at night.

## 2020-05-07 NOTE — TELEPHONE ENCOUNTER
Please clarify what dose and frequency he takes.  He has picked up multiple different prescriptions in the past, and I was under the impression he was on 200mg daily.

## 2020-06-18 DIAGNOSIS — N52.9 ERECTILE DYSFUNCTION, UNSPECIFIED ERECTILE DYSFUNCTION TYPE: ICD-10-CM

## 2020-06-18 RX ORDER — TADALAFIL 20 MG/1
TABLET ORAL
Qty: 15 TAB | Refills: 5 | Status: SHIPPED | OUTPATIENT
Start: 2020-06-18 | End: 2020-08-27 | Stop reason: SDUPTHER

## 2020-08-27 ENCOUNTER — OFFICE VISIT (OUTPATIENT)
Dept: INTERNAL MEDICINE CLINIC | Age: 60
End: 2020-08-27

## 2020-08-27 VITALS
SYSTOLIC BLOOD PRESSURE: 114 MMHG | WEIGHT: 172.6 LBS | BODY MASS INDEX: 25.56 KG/M2 | OXYGEN SATURATION: 97 % | HEART RATE: 80 BPM | TEMPERATURE: 98.9 F | HEIGHT: 69 IN | DIASTOLIC BLOOD PRESSURE: 75 MMHG | RESPIRATION RATE: 12 BRPM

## 2020-08-27 DIAGNOSIS — Z72.0 TOBACCO USE: ICD-10-CM

## 2020-08-27 DIAGNOSIS — I48.0 PAROXYSMAL ATRIAL FIBRILLATION (HCC): ICD-10-CM

## 2020-08-27 DIAGNOSIS — I73.9 PERIPHERAL ARTERIAL DISEASE (HCC): ICD-10-CM

## 2020-08-27 DIAGNOSIS — E11.9 CONTROLLED TYPE 2 DIABETES MELLITUS WITHOUT COMPLICATION, WITHOUT LONG-TERM CURRENT USE OF INSULIN (HCC): Primary | ICD-10-CM

## 2020-08-27 DIAGNOSIS — R23.3 EASY BRUISING: ICD-10-CM

## 2020-08-27 DIAGNOSIS — N52.9 ERECTILE DYSFUNCTION, UNSPECIFIED ERECTILE DYSFUNCTION TYPE: ICD-10-CM

## 2020-08-27 DIAGNOSIS — Z12.11 SCREENING FOR COLON CANCER: ICD-10-CM

## 2020-08-27 DIAGNOSIS — L97.521 SKIN ULCER OF LEFT FOOT, LIMITED TO BREAKDOWN OF SKIN (HCC): ICD-10-CM

## 2020-08-27 DIAGNOSIS — E78.5 HYPERLIPIDEMIA, UNSPECIFIED HYPERLIPIDEMIA TYPE: ICD-10-CM

## 2020-08-27 DIAGNOSIS — I10 ESSENTIAL HYPERTENSION: ICD-10-CM

## 2020-08-27 DIAGNOSIS — H91.93 BILATERAL HEARING LOSS, UNSPECIFIED HEARING LOSS TYPE: ICD-10-CM

## 2020-08-27 PROCEDURE — 99214 OFFICE O/P EST MOD 30 MIN: CPT | Performed by: INTERNAL MEDICINE

## 2020-08-27 RX ORDER — SILDENAFIL CITRATE 20 MG/1
20 TABLET ORAL DAILY
Qty: 90 TAB | Refills: 3 | Status: SHIPPED | OUTPATIENT
Start: 2020-08-27 | End: 2021-02-13

## 2020-08-27 RX ORDER — ATORVASTATIN CALCIUM 40 MG/1
40 TABLET, FILM COATED ORAL DAILY
Qty: 90 TAB | Refills: 3 | Status: SHIPPED | OUTPATIENT
Start: 2020-08-27 | End: 2021-06-14

## 2020-08-27 RX ORDER — ASPIRIN 81 MG/1
TABLET ORAL DAILY
COMMUNITY
End: 2020-08-27 | Stop reason: SDUPTHER

## 2020-08-27 RX ORDER — CARVEDILOL 12.5 MG/1
TABLET ORAL 2 TIMES DAILY WITH MEALS
COMMUNITY
End: 2020-08-27 | Stop reason: SDUPTHER

## 2020-08-27 RX ORDER — GABAPENTIN 100 MG/1
200 CAPSULE ORAL DAILY
COMMUNITY
End: 2020-08-27

## 2020-08-27 RX ORDER — METFORMIN HYDROCHLORIDE 1000 MG/1
1000 TABLET ORAL 2 TIMES DAILY WITH MEALS
COMMUNITY
End: 2020-08-27 | Stop reason: SDUPTHER

## 2020-08-27 RX ORDER — LISINOPRIL 10 MG/1
TABLET ORAL DAILY
COMMUNITY
End: 2020-08-27 | Stop reason: SDUPTHER

## 2020-08-27 RX ORDER — TADALAFIL 20 MG/1
40 TABLET ORAL AS NEEDED
Qty: 180 TAB | Refills: 3 | Status: SHIPPED | OUTPATIENT
Start: 2020-08-27 | End: 2020-08-28 | Stop reason: SDUPTHER

## 2020-08-27 RX ORDER — LISINOPRIL 10 MG/1
10 TABLET ORAL DAILY
Qty: 90 TAB | Refills: 3 | Status: SHIPPED | OUTPATIENT
Start: 2020-08-27 | End: 2021-05-25

## 2020-08-27 RX ORDER — ASPIRIN 81 MG/1
81 TABLET ORAL DAILY
Qty: 90 TAB | Refills: 3 | Status: SHIPPED | OUTPATIENT
Start: 2020-08-27 | End: 2021-03-26

## 2020-08-27 RX ORDER — GLIPIZIDE 10 MG/1
10 TABLET, FILM COATED, EXTENDED RELEASE ORAL 2 TIMES DAILY
COMMUNITY
End: 2020-08-27 | Stop reason: SDUPTHER

## 2020-08-27 RX ORDER — GABAPENTIN 100 MG/1
100 CAPSULE ORAL DAILY
Qty: 90 CAP | Refills: 3 | Status: SHIPPED | OUTPATIENT
Start: 2020-08-27 | End: 2020-12-07

## 2020-08-27 RX ORDER — ATORVASTATIN CALCIUM 40 MG/1
TABLET, FILM COATED ORAL DAILY
COMMUNITY
End: 2020-08-27 | Stop reason: SDUPTHER

## 2020-08-27 RX ORDER — GLIPIZIDE 10 MG/1
10 TABLET, FILM COATED, EXTENDED RELEASE ORAL 2 TIMES DAILY
Qty: 180 TAB | Refills: 3 | Status: SHIPPED | OUTPATIENT
Start: 2020-08-27 | End: 2020-12-31 | Stop reason: SDUPTHER

## 2020-08-27 RX ORDER — GABAPENTIN 400 MG/1
400 CAPSULE ORAL
Qty: 90 CAP | Refills: 3 | Status: SHIPPED | OUTPATIENT
Start: 2020-08-27 | End: 2020-12-07

## 2020-08-27 RX ORDER — METFORMIN HYDROCHLORIDE 1000 MG/1
1000 TABLET ORAL 2 TIMES DAILY WITH MEALS
Qty: 180 TAB | Refills: 3 | Status: SHIPPED | OUTPATIENT
Start: 2020-08-27 | End: 2021-02-18

## 2020-08-27 RX ORDER — CARVEDILOL 12.5 MG/1
12.5 TABLET ORAL 2 TIMES DAILY WITH MEALS
Qty: 180 TAB | Refills: 3 | Status: SHIPPED | OUTPATIENT
Start: 2020-08-27 | End: 2021-02-23 | Stop reason: SDUPTHER

## 2020-08-27 NOTE — PROGRESS NOTES
Assessment and Plan   Diagnoses and all orders for this visit:    1. Controlled type 2 diabetes mellitus without complication, without long-term current use of insulin (HCC)  -     HEMOGLOBIN A1C WITH EAG; Future  -     MICROALBUMIN, UR, RAND W/ MICROALB/CREAT RATIO; Future  -     aspirin delayed-release 81 mg tablet; Take 1 Tab by mouth daily. -     atorvastatin (LIPITOR) 40 mg tablet; Take 1 Tab by mouth daily. Indications: excessive fat in the blood  -     gabapentin (NEURONTIN) 400 mg capsule; Take 1 Cap by mouth nightly. Max Daily Amount: 400 mg. Indications: neuropathic pain  -     gabapentin (NEURONTIN) 100 mg capsule; Take 1 Cap by mouth daily. Max Daily Amount: 100 mg. Indications: neuropathic pain  -     lisinopriL (PRINIVIL, ZESTRIL) 10 mg tablet; Take 1 Tab by mouth daily. Indications: high blood pressure  -     metFORMIN (GLUCOPHAGE) 1,000 mg tablet; Take 1 Tab by mouth two (2) times daily (with meals). Indications: type 2 diabetes mellitus  -     glipiZIDE SR (GLUCOTROL XL) 10 mg CR tablet; Take 1 Tab by mouth two (2) times a day. Indications: type 2 diabetes mellitus  Glucose ranging . Tolerating glipizide and metformin well. Has neuropathy and uses gabapentin 400 in the evening and 100 in the morning and that is helping with his symptoms. 2. Hyperlipidemia, unspecified hyperlipidemia type  Tolerating medication well. Continue atorvastatin    3. Essential hypertension  Well-controlled. Continue Coreg and lisinopril    4. Easy bruising  -     PROTHROMBIN TIME + INR; Future  -     CBC WITH AUTOMATED DIFF; Future  -     PTT; Future  -     METABOLIC PANEL, COMPREHENSIVE; Future  Ongoing for at least the past 2 years. Denies any other bleeding such as nosebleeds, gum bleeding, blood in his stools, hematuria. Denies any fevers, chills, night sweats, unexpected weight loss. Most likely secondary to apixaban and aspirin use. Will check PTT and INR and CBC to ensure no other abnormalities. Advised to be more careful and to try not to hit things    5. Paroxysmal atrial fibrillation (HCC)  -     carvediloL (COREG) 12.5 mg tablet; Take 1 Tab by mouth two (2) times daily (with meals). Indications: atrial fibrillation  -     apixaban (Eliquis) 5 mg tablet; Take 1 Tab by mouth two (2) times a day. Indications: treatment to prevent blood clots in chronic atrial fibrillation  Regular on exam today. Continue medications    6. Erectile dysfunction, unspecified erectile dysfunction type  -     tadalafiL (CIALIS) 20 mg tablet; Take 2 Tabs by mouth as needed for Erectile Dysfunction. -     sildenafiL, pulmonary hypertension, (REVATIO) 20 mg tablet; Take 1 Tab by mouth daily. Patient was requesting more pills per a month. 7. Skin ulcer of left foot, limited to breakdown of skin ([address]  Has since healed after biopsy and abx  Bone biopsy Cx 1/24/20 with pan susceptible Enterobacter  Now s/p lt SFA angioplasty. Saw wound care downstairs  Dr. Dejah Brewer with vascular    8. Tobacco use  -     CT LOW DOSE LUNG CANCER SCREENING; Future  Wellbutrin did not work for him, did not like nicotine patches, Chantix are expensive. Given online resources for tobacco cessation    9. Bilateral hearing loss, unspecified hearing loss type  History of right ear surgery reviewed and you can see his tympanic membrane pretty easily. Was given phone number for [facility]    10. Screening for colon cancer  -     REFERRAL TO GASTROENTEROLOGY  Patient is not interested in getting colonoscopy at this time but I gave him the phone number in case he ever changes his mind      Benefits, risks, possible drug interactions, and side effects of all new medications were reviewed with the patient. Pt verbalized understanding. Return to clinic: 6 months for general follow-up    Sharron Cranker, MD  [facility]  8/27/2020    No future appointments.      Subjective   Chief Complaint   Needs a letter    Matt Ronquillo Verna Nageotte is a 62 y.o. male     Requesting a letter certifying that he is at high risk for [address]. Otherwise doing well except for some easy bruising that has been ongoing for the past 2 years    Review of Systems   Constitutional: Negative for chills and fever. Respiratory: Negative for shortness of breath. Cardiovascular: Negative for chest pain. Objective   Vitals:       Visit Vitals  /75 (BP 1 Location: Left arm, BP Patient Position: Sitting)   Pulse 80   Temp 98.9 °F (37.2 °C) (Oral)   Resp 12   Ht 5' 9\" (1.753 m)   Wt 172 lb 9.6 oz (78.3 kg)   SpO2 97%   BMI 25.49 kg/m²        Physical Exam  Constitutional:       General: He is not in acute distress. Appearance: He is well-developed. HENT:      Left Ear: External ear normal. There is impacted cerumen. Ears:      Comments: Extensive right ear surgery. Able to visualize TM without an otoscope. TM scarring. Visualized portion of left TM was normal  Eyes:      Extraocular Movements: Extraocular movements intact. Conjunctiva/sclera: Conjunctivae normal.   Neck:      Musculoskeletal: Neck supple. Cardiovascular:      Rate and Rhythm: Normal rate and regular rhythm. Pulses: Normal pulses. Heart sounds: No murmur. No friction rub. No gallop. Pulmonary:      Effort: No respiratory distress. Breath sounds: No wheezing or rales. Abdominal:      General: Bowel sounds are normal. There is no distension. Palpations: Abdomen is soft. There is no hepatomegaly, splenomegaly or mass. Tenderness: There is no abdominal tenderness. Hernia: No hernia is present. Skin:     General: Skin is warm. Findings: No rash. Neurological:      Mental Status: He is alert. Gait: Gait normal.   Psychiatric:         Mood and Affect: Mood normal.         Thought Content:  Thought content normal.         Judgment: Judgment normal.          Current Outpatient Medications   Medication Sig    aspirin delayed-release 81 mg tablet Take 1 Tab by mouth daily.  atorvastatin (LIPITOR) 40 mg tablet Take 1 Tab by mouth daily. Indications: excessive fat in the blood    carvediloL (COREG) 12.5 mg tablet Take 1 Tab by mouth two (2) times daily (with meals). Indications: atrial fibrillation    gabapentin (NEURONTIN) 400 mg capsule Take 1 Cap by mouth nightly. Max Daily Amount: 400 mg. Indications: neuropathic pain    gabapentin (NEURONTIN) 100 mg capsule Take 1 Cap by mouth daily. Max Daily Amount: 100 mg. Indications: neuropathic pain    lisinopriL (PRINIVIL, ZESTRIL) 10 mg tablet Take 1 Tab by mouth daily. Indications: high blood pressure    metFORMIN (GLUCOPHAGE) 1,000 mg tablet Take 1 Tab by mouth two (2) times daily (with meals). Indications: type 2 diabetes mellitus    tadalafiL (CIALIS) 20 mg tablet Take 2 Tabs by mouth as needed for Erectile Dysfunction.  sildenafiL, pulmonary hypertension, (REVATIO) 20 mg tablet Take 1 Tab by mouth daily.  glipiZIDE SR (GLUCOTROL XL) 10 mg CR tablet Take 1 Tab by mouth two (2) times a day. Indications: type 2 diabetes mellitus    apixaban (Eliquis) 5 mg tablet Take 1 Tab by mouth two (2) times a day. Indications: treatment to prevent blood clots in chronic atrial fibrillation    multivitamin (ONE A DAY) tablet Take 1 Tab by mouth daily. No current facility-administered medications for this visit.         Voice recognition software is utilized and this note may contain transcription errors

## 2020-08-27 NOTE — LETTER
2020 10:08 AM 
 
Mr. Church [address] [address] To whom it may concern, This letter is to certify that [address] [place] [address] Road  1960 is in a high risk category for COVID19 and is at high risk for experiencing complications from [id number].  
 
 
Sincerely, 
 
 
Jaziel Mast MD

## 2020-08-28 ENCOUNTER — DOCUMENTATION ONLY (OUTPATIENT)
Dept: INTERNAL MEDICINE CLINIC | Age: 60
End: 2020-08-28

## 2020-08-28 DIAGNOSIS — N52.9 ERECTILE DYSFUNCTION, UNSPECIFIED ERECTILE DYSFUNCTION TYPE: ICD-10-CM

## 2020-08-28 RX ORDER — TADALAFIL 20 MG/1
40 TABLET ORAL AS NEEDED
Qty: 90 TAB | Refills: 3 | Status: SHIPPED | OUTPATIENT
Start: 2020-08-28 | End: 2020-09-03 | Stop reason: SDUPTHER

## 2020-08-28 NOTE — PROGRESS NOTES
Spoke to pt. He states he only takes 1 tab po prn.  He is requesting a updated rx go to [facility]

## 2020-09-03 ENCOUNTER — TELEPHONE (OUTPATIENT)
Dept: INTERNAL MEDICINE CLINIC | Age: 60
End: 2020-09-03

## 2020-09-03 DIAGNOSIS — N52.9 ERECTILE DYSFUNCTION, UNSPECIFIED ERECTILE DYSFUNCTION TYPE: ICD-10-CM

## 2020-09-03 RX ORDER — TADALAFIL 20 MG/1
20 TABLET ORAL AS NEEDED
Qty: 30 TAB | Refills: 3 | Status: SHIPPED | OUTPATIENT
Start: 2020-09-03 | End: 2021-02-13

## 2020-09-03 RX ORDER — TADALAFIL 20 MG/1
20 TABLET ORAL AS NEEDED
Qty: 90 TAB | Refills: 3 | Status: SHIPPED | OUTPATIENT
Start: 2020-09-03 | End: 2020-09-03 | Stop reason: SDUPTHER

## 2020-09-03 NOTE — TELEPHONE ENCOUNTER
Patient is following up , states he requested that we not write the script for twice a day and for some reason the script was still written for twice a day.      Per patient the script requires a PA to be completed for the medication and a POA to be completed for the Quantity     Requesting a call back asap at [phone]

## 2020-09-03 NOTE — TELEPHONE ENCOUNTER
[address] with [facility] is requesting to speak with the nurse to clarify directions on patient's script for Tadalafil, states the script is written for 2 tablets PRN states the max is no more than 1 as needed.      She can be reached at [phone] ref #[id number]

## 2020-09-03 NOTE — TELEPHONE ENCOUNTER
Spoke to [employer], I was advised that the patients insurance will only cover 24 tabs for a 90 days supply and if he would like more than 24 tabs, he can payout of pocket per PCP. Pt was advised and became increasingly angry and demanding. While explaining the reasoning for this and his insurance coverage, pt abruptly disconnected the line.

## 2020-09-03 NOTE — TELEPHONE ENCOUNTER
Returned call to pt. He was advised that the updated medication for 1 tablet po every day prn has been sent to the pharmacy. He states a new PA needs to be submitted and a \"POA\" for the clonazepam at the same time.  I advised him that the PA for the Cialis is handled separately from the any other medications but patient argued that he knew what we needed to do to get the medication covered and advised me to call the pharmacist/

## 2020-09-03 NOTE — TELEPHONE ENCOUNTER
Called patient. Advised him his options were to get the #24 tablets for 90 day supply through [facility] with a prior authorization or to use the goodrx card at [facility] and get #30 tabs for $25. Patient states that he is a goodrx \"gold\" member so he would like a script sent in to [facility] [address]. Reiterated to patient that this was for 1 tablet per day - the max dose.      Renny To, PharmD, BCPS, CDCES

## 2020-09-03 NOTE — TELEPHONE ENCOUNTER
Patient called back, states he's requesting to speak with his PCP directly, states whom ever he was speaking with hung up on him .

## 2020-09-29 ENCOUNTER — OFFICE VISIT (OUTPATIENT)
Dept: INTERNAL MEDICINE CLINIC | Age: 60
End: 2020-09-29
Payer: COMMERCIAL

## 2020-09-29 DIAGNOSIS — L05.91 PILONIDAL CYST: Primary | ICD-10-CM

## 2020-09-29 PROCEDURE — 99214 OFFICE O/P EST MOD 30 MIN: CPT | Performed by: INTERNAL MEDICINE

## 2020-09-29 RX ORDER — DOXYCYCLINE 100 MG/1
100 CAPSULE ORAL 2 TIMES DAILY
Qty: 10 CAP | Refills: 0 | Status: SHIPPED | OUTPATIENT
Start: 2020-09-29 | End: 2020-10-04

## 2020-09-29 NOTE — PROGRESS NOTES
Assessment and Plan   Diagnoses and all orders for this visit:    1. Pilonidal cyst  -     doxycycline (MONODOX) 100 mg capsule; Take 1 Cap by mouth two (2) times a day for 5 days. Presents the clinic for pain in his sacral area for the past week that has been persistent since then. Reports difficulty sitting down due to pain. Denies any fever, chills, blood, discharge. Patient is set up with an appointment with Dr. Gina Rome with [facility] tomorrow. Will provide doxycycline for cellulitis. Benefits, risks, possible drug interactions, and side effects of all new medications were reviewed with the patient. Pt verbalized understanding. Return to clinic:   As scheduled    Angeline Hammans, MD  [facility]  9/29/2020    Future Appointments   Date Time Provider Ximena Varghese   6/88/1732  6:09 PM Efren Gloria MD [facility]        Subjective   Chief Complaint   Bump on his back    Raphael Perez is a 62 y.o. male         Review of Systems   Constitutional: Negative for chills and fever. Respiratory: Negative for shortness of breath. Cardiovascular: Negative for chest pain. Objective   Vitals: There were no vitals taken for this visit. Physical Exam  Constitutional:       Appearance: Normal appearance. Pulmonary:      Effort: No respiratory distress. Skin:            Comments: Roughly [id number] area of induration with mild erythema and central punctum noted. No drainage noted. Tender to palpation   Neurological:      Mental Status: He is alert. Gait: Gait normal.   Psychiatric:         Mood and Affect: Mood normal.         Thought Content: Thought content normal.         Judgment: Judgment normal.          Current Outpatient Medications   Medication Sig    doxycycline (MONODOX) 100 mg capsule Take 1 Cap by mouth two (2) times a day for 5 days.     tadalafiL (CIALIS) 20 mg tablet Take 1 Tab by mouth as needed for Erectile Dysfunction.  aspirin delayed-release 81 mg tablet Take 1 Tab by mouth daily.  atorvastatin (LIPITOR) 40 mg tablet Take 1 Tab by mouth daily. Indications: excessive fat in the blood    carvediloL (COREG) 12.5 mg tablet Take 1 Tab by mouth two (2) times daily (with meals). Indications: atrial fibrillation    gabapentin (NEURONTIN) 400 mg capsule Take 1 Cap by mouth nightly. Max Daily Amount: 400 mg. Indications: neuropathic pain    gabapentin (NEURONTIN) 100 mg capsule Take 1 Cap by mouth daily. Max Daily Amount: 100 mg. Indications: neuropathic pain    lisinopriL (PRINIVIL, ZESTRIL) 10 mg tablet Take 1 Tab by mouth daily. Indications: high blood pressure    metFORMIN (GLUCOPHAGE) 1,000 mg tablet Take 1 Tab by mouth two (2) times daily (with meals). Indications: type 2 diabetes mellitus    sildenafiL, pulmonary hypertension, (REVATIO) 20 mg tablet Take 1 Tab by mouth daily.  glipiZIDE SR (GLUCOTROL XL) 10 mg CR tablet Take 1 Tab by mouth two (2) times a day. Indications: type 2 diabetes mellitus    apixaban (Eliquis) 5 mg tablet Take 1 Tab by mouth two (2) times a day. Indications: treatment to prevent blood clots in chronic atrial fibrillation    multivitamin (ONE A DAY) tablet Take 1 Tab by mouth daily. No current facility-administered medications for this visit.         Voice recognition software is utilized and this note may contain transcription errors

## 2020-09-30 ENCOUNTER — TELEPHONE (OUTPATIENT)
Dept: INTERNAL MEDICINE CLINIC | Age: 60
End: 2020-09-30

## 2020-09-30 LAB
ALBUMIN SERPL-MCNC: 4.5 G/DL (ref 3.8–4.9)
ALBUMIN/CREAT UR: <10 MG/G CREAT (ref 0–29)
ALBUMIN/GLOB SERPL: 2.4 {RATIO} (ref 1.2–2.2)
ALP SERPL-CCNC: 85 IU/L (ref 39–117)
ALT SERPL-CCNC: 10 IU/L (ref 0–44)
APTT PPP: 29 SEC (ref 24–33)
AST SERPL-CCNC: 8 IU/L (ref 0–40)
BASOPHILS # BLD AUTO: 0.1 X10E3/UL (ref 0–0.2)
BASOPHILS NFR BLD AUTO: 0 %
BILIRUB SERPL-MCNC: 0.4 MG/DL (ref 0–1.2)
BUN SERPL-MCNC: 13 MG/DL (ref 6–24)
BUN/CREAT SERPL: 12 (ref 9–20)
CALCIUM SERPL-MCNC: 9.5 MG/DL (ref 8.7–10.2)
CHLORIDE SERPL-SCNC: 102 MMOL/L (ref 96–106)
CO2 SERPL-SCNC: 22 MMOL/L (ref 20–29)
CREAT SERPL-MCNC: 1.09 MG/DL (ref 0.76–1.27)
CREAT UR-MCNC: 28.8 MG/DL
EOSINOPHIL # BLD AUTO: 0.3 X10E3/UL (ref 0–0.4)
EOSINOPHIL NFR BLD AUTO: 2 %
ERYTHROCYTE [DISTWIDTH] IN BLOOD BY AUTOMATED COUNT: 12.8 % (ref 11.6–15.4)
EST. AVERAGE GLUCOSE BLD GHB EST-MCNC: 171 MG/DL
GLOBULIN SER CALC-MCNC: 1.9 G/DL (ref 1.5–4.5)
GLUCOSE SERPL-MCNC: 192 MG/DL (ref 65–99)
HBA1C MFR BLD: 7.6 % (ref 4.8–5.6)
HCT VFR BLD AUTO: 45.1 % (ref 37.5–51)
HGB BLD-MCNC: 14.9 G/DL (ref 13–17.7)
IMM GRANULOCYTES # BLD AUTO: 0 X10E3/UL (ref 0–0.1)
IMM GRANULOCYTES NFR BLD AUTO: 0 %
INR PPP: 1 (ref 0.8–1.2)
LYMPHOCYTES # BLD AUTO: 1.5 X10E3/UL (ref 0.7–3.1)
LYMPHOCYTES NFR BLD AUTO: 14 %
MCH RBC QN AUTO: 29 PG (ref 26.6–33)
MCHC RBC AUTO-ENTMCNC: 33 G/DL (ref 31.5–35.7)
MCV RBC AUTO: 88 FL (ref 79–97)
MICROALBUMIN UR-MCNC: <3 UG/ML
MONOCYTES # BLD AUTO: 0.7 X10E3/UL (ref 0.1–0.9)
MONOCYTES NFR BLD AUTO: 6 %
NEUTROPHILS # BLD AUTO: 8.6 X10E3/UL (ref 1.4–7)
NEUTROPHILS NFR BLD AUTO: 78 %
PLATELET # BLD AUTO: 216 X10E3/UL (ref 150–450)
POTASSIUM SERPL-SCNC: 5 MMOL/L (ref 3.5–5.2)
PROT SERPL-MCNC: 6.4 G/DL (ref 6–8.5)
PROTHROMBIN TIME: 10.3 SEC (ref 9.1–12)
RBC # BLD AUTO: 5.14 X10E6/UL (ref 4.14–5.8)
SODIUM SERPL-SCNC: 139 MMOL/L (ref 134–144)
WBC # BLD AUTO: 11.1 X10E3/UL (ref 3.4–10.8)

## 2020-09-30 NOTE — PROGRESS NOTES
Labs reviewed and normal.  Letter sent. WBC elevated most likely secondary to pilonidal cyst.  CMP normal.  No microalbuminuria.   INR normal.  A1c 7.6 PTT normal.

## 2020-09-30 NOTE — TELEPHONE ENCOUNTER
----- Message from [employer] sent at 9/30/2020 11:15 AM EDT -----  Regarding: Dr. Marisol Zhang first and last name: Inocencio Montes  Reason for call: Requested a call back   Callback required yes/no and why: Yes   Best contact number(s): [phone]  Details to clarify the request: Pt stated he has an appointment today with Dr. Tristan Lara as advised by Dr. Frantz Montanez at 7:30KZ. He would like to cancel his appointment because his cyst popped on last night he would like to number to Dr. Tristan Lara office as soon as possible to have that appointment cancelled.

## 2020-09-30 NOTE — TELEPHONE ENCOUNTER
Returned call to pt. He states he already called [facility] to cancel his appt as the cyst ruptured last night.  He states he will finish the abx and will call back to schedule an appt if needed

## 2020-12-07 ENCOUNTER — OFFICE VISIT (OUTPATIENT)
Dept: INTERNAL MEDICINE CLINIC | Age: 60
End: 2020-12-07
Payer: COMMERCIAL

## 2020-12-07 VITALS
BODY MASS INDEX: 26.22 KG/M2 | HEIGHT: 69 IN | TEMPERATURE: 98.2 F | RESPIRATION RATE: 16 BRPM | SYSTOLIC BLOOD PRESSURE: 145 MMHG | WEIGHT: 177 LBS | DIASTOLIC BLOOD PRESSURE: 85 MMHG | OXYGEN SATURATION: 98 % | HEART RATE: 89 BPM

## 2020-12-07 DIAGNOSIS — E11.40 TYPE 2 DIABETES MELLITUS WITH DIABETIC NEUROPATHY, WITHOUT LONG-TERM CURRENT USE OF INSULIN (HCC): Primary | ICD-10-CM

## 2020-12-07 DIAGNOSIS — I10 ESSENTIAL HYPERTENSION: ICD-10-CM

## 2020-12-07 DIAGNOSIS — E11.51 TYPE 2 DIABETES MELLITUS WITH PERIPHERAL VASCULAR DISEASE (HCC): ICD-10-CM

## 2020-12-07 DIAGNOSIS — Z72.0 TOBACCO USE: ICD-10-CM

## 2020-12-07 PROCEDURE — 90686 IIV4 VACC NO PRSV 0.5 ML IM: CPT | Performed by: INTERNAL MEDICINE

## 2020-12-07 PROCEDURE — 90471 IMMUNIZATION ADMIN: CPT | Performed by: INTERNAL MEDICINE

## 2020-12-07 PROCEDURE — 99214 OFFICE O/P EST MOD 30 MIN: CPT | Performed by: INTERNAL MEDICINE

## 2020-12-07 RX ORDER — GABAPENTIN 100 MG/1
200 CAPSULE ORAL EVERY MORNING
Qty: 180 CAP | Refills: 1 | Status: SHIPPED | OUTPATIENT
Start: 2020-12-07 | End: 2020-12-30 | Stop reason: SDUPTHER

## 2020-12-07 RX ORDER — GABAPENTIN 300 MG/1
600 CAPSULE ORAL
Qty: 180 CAP | Refills: 1 | Status: SHIPPED | OUTPATIENT
Start: 2020-12-07 | End: 2020-12-30 | Stop reason: SDUPTHER

## 2020-12-07 NOTE — PROGRESS NOTES
Angel Bartholomew is a 62 y.o. male    Chief Complaint   Patient presents with    Medication Evaluation     gabapentin     Injection     requesting flu and shingles     Numbness     left foot concerns    gabapentin increased or changed to something else per Dr Debbie Jones podiatrist     Health Maintenance Due   Topic Date Due    Foot Exam Q1  03/12/1971    Eye Exam Retinal or Dilated  03/12/1971    Shingrix Vaccine Age 50> (1 of 2) 03/12/2011    Colorectal Cancer Screening Combo  03/12/2011    Flu Vaccine (1) 09/01/2020       Visit Vitals  BP (!) 145/85 (BP 1 Location: Left arm, BP Patient Position: Sitting)   Pulse 89   Temp 98.2 °F (36.8 °C) (Oral)   Resp 16   Ht 5' 9\" (1.753 m)   Wt 177 lb (80.3 kg)   SpO2 98%   BMI 26.14 kg/m²       3 most recent PHQ Screens 12/7/2020   Little interest or pleasure in doing things Not at all   Feeling down, depressed, irritable, or hopeless Not at all   Total Score PHQ 2 0       Fall Risk Assessment, last 12 mths 12/7/2020   Able to walk? Yes   Fall in past 12 months? No       Abuse Screening Questionnaire 8/27/2020   Do you ever feel afraid of your partner? N   Are you in a relationship with someone who physically or mentally threatens you? N   Is it safe for you to go home? Y         1. Have you been to the ER, urgent care clinic since your last visit? Hospitalized since your last visit?no    2. Have you seen or consulted any other health care providers outside of the [address] [place] [address] since your last visit? Include any pap smears or colon screening.  no

## 2020-12-07 NOTE — PROGRESS NOTES
Assessment and Plan   Diagnoses and all orders for this visit:    1. Type 2 diabetes mellitus with diabetic neuropathy, without long-term current use of insulin (HCC)  -     gabapentin (NEURONTIN) 300 mg capsule; Take 2 Caps by mouth nightly. Max Daily Amount: 600 mg.  -     gabapentin (NEURONTIN) 100 mg capsule; Take 2 Caps by mouth Every morning. Max Daily Amount: 200 mg.  -     INFLUENZA VIRUS VAC QUAD,SPLIT,PRESV FREE SYRINGE IM  2. Type 2 diabetes mellitus with peripheral vascular disease ([address]  Seen by podiatry this morning. Reports that his left foot numbness has been getting worse over the past few months. When he sleeps, he has to sleep with his legs hanging off of the bed because putting blankets on his feet is uncomfortable. History of left SFA angioplasty    Pedal pulses difficult to palpate on exam.  Slow cap refill bilaterally. Recommend increasing gabapentin to 200 mg in the morning and 600 in the evening. Recommend following up with his vascular surgeon. Recommend cutting down on tobacco use    3. Tobacco use  Exam room smells strongly of tobacco.  Patient reports that he is smoking less than a pack a day. Discussed using nicotine patches to help him quit. Discussed that continuing to smoke will worsen his vascular disease. 4. Essential hypertension  Elevated today. Reports that he forgot to take his medications this morning. Recommend continuing medications and recheck at next visit    Patient notes that his podiatrist is planning on starting medication for his onychomycosis. He is not sure what it is, but sounds like terbinafine    Benefits, risks, possible drug interactions, and side effects of all new medications were reviewed with the patient. Pt verbalized understanding.     Return to clinic: 3 months for diabetes and neuropathy follow-up    Paige Martinez MD  [facility]  12/7/2020    Future Appointments   Date Time Provider Department [place]   4/91/0378  3:73 PM Bhaskar Daniels MD [facility] BS AMB        Subjective   Chief Complaint   Foot numbness    Radha Randall is a 62 y.o. male         Review of Systems   Constitutional: Negative for chills and fever. Respiratory: Negative for shortness of breath. Cardiovascular: Negative for chest pain. Objective   Vitals:       Visit Vitals  BP (!) 145/85 (BP 1 Location: Left arm, BP Patient Position: Sitting)   Pulse 89   Temp 98.2 °F (36.8 °C) (Oral)   Resp 16   Ht 5' 9\" (1.753 m)   Wt 177 lb (80.3 kg)   SpO2 98%   BMI 26.14 kg/m²        Physical Exam  Constitutional:       Appearance: Normal appearance. He is not ill-appearing. Cardiovascular:      Rate and Rhythm: Normal rate. Pulmonary:      Effort: No respiratory distress. Musculoskeletal:      Comments: Slow cap refill. Feet cool. Color normal.  Toes thickened bilaterally. Unable to palpate pedal pulses bilaterally. No swelling noted. Neurological:      Mental Status: He is alert. Psychiatric:         Mood and Affect: Mood normal.         Thought Content: Thought content normal.         Judgment: Judgment normal.          Current Outpatient Medications   Medication Sig    gabapentin (NEURONTIN) 300 mg capsule Take 2 Caps by mouth nightly. Max Daily Amount: 600 mg.    gabapentin (NEURONTIN) 100 mg capsule Take 2 Caps by mouth Every morning. Max Daily Amount: 200 mg.  tadalafiL (CIALIS) 20 mg tablet Take 1 Tab by mouth as needed for Erectile Dysfunction.  aspirin delayed-release 81 mg tablet Take 1 Tab by mouth daily.  atorvastatin (LIPITOR) 40 mg tablet Take 1 Tab by mouth daily. Indications: excessive fat in the blood    carvediloL (COREG) 12.5 mg tablet Take 1 Tab by mouth two (2) times daily (with meals). Indications: atrial fibrillation    lisinopriL (PRINIVIL, ZESTRIL) 10 mg tablet Take 1 Tab by mouth daily.  Indications: high blood pressure    metFORMIN (GLUCOPHAGE) 1,000 mg tablet Take 1 Tab by mouth two (2) times daily (with meals). Indications: type 2 diabetes mellitus    sildenafiL, pulmonary hypertension, (REVATIO) 20 mg tablet Take 1 Tab by mouth daily.  glipiZIDE SR (GLUCOTROL XL) 10 mg CR tablet Take 1 Tab by mouth two (2) times a day. Indications: type 2 diabetes mellitus    apixaban (Eliquis) 5 mg tablet Take 1 Tab by mouth two (2) times a day. Indications: treatment to prevent blood clots in chronic atrial fibrillation    multivitamin (ONE A DAY) tablet Take 1 Tab by mouth daily. No current facility-administered medications for this visit.

## 2020-12-07 NOTE — PROGRESS NOTES
Patient present for routine immunizations. Pt denies any symptoms , reactions or allergies that would exclude them from being immunized today. Risks and adverse reactions were discussed and the VIS was given to them. All questions were addressed. Pt was observed for 10 min post injection. There were no reactions observed.       Madhuri Camacho LPN

## 2020-12-30 DIAGNOSIS — E11.40 TYPE 2 DIABETES MELLITUS WITH DIABETIC NEUROPATHY, WITHOUT LONG-TERM CURRENT USE OF INSULIN (HCC): ICD-10-CM

## 2020-12-30 RX ORDER — GABAPENTIN 300 MG/1
600 CAPSULE ORAL
Qty: 180 CAP | Refills: 0 | Status: SHIPPED | OUTPATIENT
Start: 2020-12-30 | End: 2020-12-30 | Stop reason: SDUPTHER

## 2020-12-30 RX ORDER — GABAPENTIN 100 MG/1
200 CAPSULE ORAL EVERY MORNING
Qty: 180 CAP | Refills: 0 | Status: SHIPPED | OUTPATIENT
Start: 2020-12-30 | End: 2021-06-14

## 2020-12-30 RX ORDER — GABAPENTIN 300 MG/1
600 CAPSULE ORAL
Qty: 180 CAP | Refills: 0 | Status: SHIPPED | OUTPATIENT
Start: 2020-12-30 | End: 2021-07-02

## 2020-12-30 RX ORDER — GABAPENTIN 100 MG/1
200 CAPSULE ORAL EVERY MORNING
Qty: 180 CAP | Refills: 0 | Status: SHIPPED | OUTPATIENT
Start: 2020-12-30 | End: 2020-12-30 | Stop reason: SDUPTHER

## 2020-12-30 NOTE — TELEPHONE ENCOUNTER
[facility] Office Pool             Medication Refill     Caller (if not patient): pt       Relationship of caller (if not patient): pt       Best contact number(s): [phone]       Name of medication and dosage if known: gabapentin 300 mg; gabapentin 100 mg (90 day supply)       Is patient out of this medication (yes/no): no       Pharmacy name: [facility] [id number] listed in chart? (yes/no): yes   Pharmacy phone number: [phone]     Date of last visit: 12/7/20     Details to clarify the request: Pt no longer has insurance as of tomorrow, requesting this be done today. For 90 day supply. Pt requesting to speak to nurse once completed.        Martha Mederos

## 2021-02-13 ENCOUNTER — HOSPITAL ENCOUNTER (EMERGENCY)
Age: 61
Discharge: OTHER INSTITUTION | End: 2021-02-13
Attending: EMERGENCY MEDICINE
Payer: MEDICAID

## 2021-02-13 ENCOUNTER — APPOINTMENT (OUTPATIENT)
Dept: GENERAL RADIOLOGY | Age: 61
End: 2021-02-13
Attending: EMERGENCY MEDICINE
Payer: MEDICAID

## 2021-02-13 ENCOUNTER — APPOINTMENT (OUTPATIENT)
Dept: NON INVASIVE DIAGNOSTICS | Age: 61
DRG: 175 | End: 2021-02-13
Attending: INTERNAL MEDICINE
Payer: MEDICAID

## 2021-02-13 ENCOUNTER — HOSPITAL ENCOUNTER (INPATIENT)
Age: 61
LOS: 3 days | Discharge: HOME | DRG: 175 | End: 2021-02-16
Attending: EMERGENCY MEDICINE | Admitting: INTERNAL MEDICINE
Payer: MEDICAID

## 2021-02-13 VITALS
TEMPERATURE: 98.6 F | DIASTOLIC BLOOD PRESSURE: 80 MMHG | HEART RATE: 88 BPM | HEIGHT: 69 IN | BODY MASS INDEX: 25.92 KG/M2 | OXYGEN SATURATION: 97 % | WEIGHT: 175 LBS | RESPIRATION RATE: 18 BRPM | SYSTOLIC BLOOD PRESSURE: 140 MMHG

## 2021-02-13 DIAGNOSIS — E11.65 UNCONTROLLED TYPE 2 DIABETES MELLITUS WITH HYPERGLYCEMIA (HCC): ICD-10-CM

## 2021-02-13 DIAGNOSIS — I20.0 UNSTABLE ANGINA (HCC): Primary | ICD-10-CM

## 2021-02-13 DIAGNOSIS — R07.9 CHEST PAIN: ICD-10-CM

## 2021-02-13 DIAGNOSIS — R73.9 HYPERGLYCEMIA: ICD-10-CM

## 2021-02-13 DIAGNOSIS — I25.110 CORONARY ARTERY DISEASE INVOLVING NATIVE CORONARY ARTERY OF NATIVE HEART WITH UNSTABLE ANGINA PECTORIS (HCC): ICD-10-CM

## 2021-02-13 LAB
ALBUMIN SERPL-MCNC: 3.7 G/DL (ref 3.5–5)
ALBUMIN/GLOB SERPL: 1.2 {RATIO} (ref 1.1–2.2)
ALP SERPL-CCNC: 65 U/L (ref 45–117)
ALT SERPL-CCNC: 18 U/L (ref 12–78)
ANION GAP SERPL CALC-SCNC: 11 MMOL/L (ref 5–15)
AST SERPL-CCNC: 8 U/L (ref 15–37)
BASOPHILS # BLD: 0 K/UL (ref 0–0.1)
BASOPHILS NFR BLD: 0 % (ref 0–1)
BILIRUB SERPL-MCNC: 0.3 MG/DL (ref 0.2–1)
BNP SERPL-MCNC: 58 PG/ML
BNP SERPL-MCNC: 65 PG/ML (ref 0–125)
BUN SERPL-MCNC: 17 MG/DL (ref 6–20)
BUN/CREAT SERPL: 11 (ref 12–20)
CALCIUM SERPL-MCNC: 9.2 MG/DL (ref 8.5–10.1)
CHLORIDE SERPL-SCNC: 103 MMOL/L (ref 97–108)
CO2 SERPL-SCNC: 25 MMOL/L (ref 21–32)
COVID-19 RAPID TEST, COVR: NOT DETECTED
CREAT SERPL-MCNC: 1.48 MG/DL (ref 0.7–1.3)
DIFFERENTIAL METHOD BLD: ABNORMAL
EOSINOPHIL # BLD: 0.2 K/UL (ref 0–0.4)
EOSINOPHIL NFR BLD: 2 % (ref 0–7)
ERYTHROCYTE [DISTWIDTH] IN BLOOD BY AUTOMATED COUNT: 12.4 % (ref 11.5–14.5)
EST. AVERAGE GLUCOSE BLD GHB EST-MCNC: 197 MG/DL
GLOBULIN SER CALC-MCNC: 3.1 G/DL (ref 2–4)
GLUCOSE BLD STRIP.AUTO-MCNC: 147 MG/DL (ref 65–100)
GLUCOSE BLD STRIP.AUTO-MCNC: 167 MG/DL (ref 65–100)
GLUCOSE BLD STRIP.AUTO-MCNC: 180 MG/DL (ref 65–100)
GLUCOSE BLD STRIP.AUTO-MCNC: 222 MG/DL (ref 65–100)
GLUCOSE SERPL-MCNC: 271 MG/DL (ref 65–100)
HBA1C MFR BLD: 8.5 % (ref 4–5.6)
HCT VFR BLD AUTO: 45.6 % (ref 36.6–50.3)
HGB BLD-MCNC: 15.4 G/DL (ref 12.1–17)
IMM GRANULOCYTES # BLD AUTO: 0.1 K/UL (ref 0–0.04)
IMM GRANULOCYTES NFR BLD AUTO: 1 % (ref 0–0.5)
INR PPP: 1 (ref 0.9–1.1)
LYMPHOCYTES # BLD: 1.2 K/UL (ref 0.8–3.5)
LYMPHOCYTES NFR BLD: 11 % (ref 12–49)
MAGNESIUM SERPL-MCNC: 1.9 MG/DL (ref 1.6–2.4)
MCH RBC QN AUTO: 29.3 PG (ref 26–34)
MCHC RBC AUTO-ENTMCNC: 33.8 G/DL (ref 30–36.5)
MCV RBC AUTO: 86.7 FL (ref 80–99)
MONOCYTES # BLD: 0.5 K/UL (ref 0–1)
MONOCYTES NFR BLD: 5 % (ref 5–13)
NEUTS SEG # BLD: 8.3 K/UL (ref 1.8–8)
NEUTS SEG NFR BLD: 81 % (ref 32–75)
NRBC # BLD: 0 K/UL (ref 0–0.01)
NRBC BLD-RTO: 0 PER 100 WBC
PLATELET # BLD AUTO: 197 K/UL (ref 150–400)
PMV BLD AUTO: 11.5 FL (ref 8.9–12.9)
POTASSIUM SERPL-SCNC: 4.4 MMOL/L (ref 3.5–5.1)
PROT SERPL-MCNC: 6.8 G/DL (ref 6.4–8.2)
PROTHROMBIN TIME: 9.7 SEC (ref 9–11.1)
RBC # BLD AUTO: 5.26 M/UL (ref 4.1–5.7)
SARS-COV-2, COV2: NORMAL
SERVICE CMNT-IMP: ABNORMAL
SODIUM SERPL-SCNC: 139 MMOL/L (ref 136–145)
SOURCE, COVRS: NORMAL
TROPONIN I BLD-MCNC: <0.04 NG/ML (ref 0–0.08)
TROPONIN I SERPL-MCNC: <0.05 NG/ML
WBC # BLD AUTO: 10.3 K/UL (ref 4.1–11.1)

## 2021-02-13 PROCEDURE — 74011250636 HC RX REV CODE- 250/636: Performed by: EMERGENCY MEDICINE

## 2021-02-13 PROCEDURE — 74011000250 HC RX REV CODE- 250: Performed by: EMERGENCY MEDICINE

## 2021-02-13 PROCEDURE — 80053 COMPREHEN METABOLIC PANEL: CPT

## 2021-02-13 PROCEDURE — 85025 COMPLETE CBC W/AUTO DIFF WBC: CPT

## 2021-02-13 PROCEDURE — 36415 COLL VENOUS BLD VENIPUNCTURE: CPT

## 2021-02-13 PROCEDURE — 83880 ASSAY OF NATRIURETIC PEPTIDE: CPT

## 2021-02-13 PROCEDURE — 93306 TTE W/DOPPLER COMPLETE: CPT

## 2021-02-13 PROCEDURE — 77030029684 HC NEB SM VOL KT MONA -A

## 2021-02-13 PROCEDURE — 83735 ASSAY OF MAGNESIUM: CPT

## 2021-02-13 PROCEDURE — 87635 SARS-COV-2 COVID-19 AMP PRB: CPT

## 2021-02-13 PROCEDURE — 84484 ASSAY OF TROPONIN QUANT: CPT

## 2021-02-13 PROCEDURE — 93005 ELECTROCARDIOGRAM TRACING: CPT

## 2021-02-13 PROCEDURE — 82962 GLUCOSE BLOOD TEST: CPT

## 2021-02-13 PROCEDURE — 93306 TTE W/DOPPLER COMPLETE: CPT | Performed by: INTERNAL MEDICINE

## 2021-02-13 PROCEDURE — 74011250637 HC RX REV CODE- 250/637: Performed by: EMERGENCY MEDICINE

## 2021-02-13 PROCEDURE — 74011000250 HC RX REV CODE- 250: Performed by: INTERNAL MEDICINE

## 2021-02-13 PROCEDURE — 74011250637 HC RX REV CODE- 250/637: Performed by: INTERNAL MEDICINE

## 2021-02-13 PROCEDURE — 85610 PROTHROMBIN TIME: CPT

## 2021-02-13 PROCEDURE — 99285 EMERGENCY DEPT VISIT HI MDM: CPT

## 2021-02-13 PROCEDURE — 71045 X-RAY EXAM CHEST 1 VIEW: CPT

## 2021-02-13 PROCEDURE — 65270000029 HC RM PRIVATE

## 2021-02-13 PROCEDURE — 74011636637 HC RX REV CODE- 636/637: Performed by: INTERNAL MEDICINE

## 2021-02-13 PROCEDURE — 96372 THER/PROPH/DIAG INJ SC/IM: CPT

## 2021-02-13 PROCEDURE — 94640 AIRWAY INHALATION TREATMENT: CPT

## 2021-02-13 PROCEDURE — 83036 HEMOGLOBIN GLYCOSYLATED A1C: CPT

## 2021-02-13 PROCEDURE — 74011250636 HC RX REV CODE- 250/636: Performed by: INTERNAL MEDICINE

## 2021-02-13 RX ORDER — POLYETHYLENE GLYCOL 3350 17 G/17G
17 POWDER, FOR SOLUTION ORAL DAILY PRN
Status: DISCONTINUED | OUTPATIENT
Start: 2021-02-13 | End: 2021-02-16 | Stop reason: HOSPADM

## 2021-02-13 RX ORDER — NITROGLYCERIN 20 MG/100ML
0-100 INJECTION INTRAVENOUS
Status: DISCONTINUED | OUTPATIENT
Start: 2021-02-13 | End: 2021-02-15

## 2021-02-13 RX ORDER — MORPHINE SULFATE 2 MG/ML
1 INJECTION, SOLUTION INTRAMUSCULAR; INTRAVENOUS
Status: DISCONTINUED | OUTPATIENT
Start: 2021-02-13 | End: 2021-02-13

## 2021-02-13 RX ORDER — MORPHINE SULFATE 10 MG/ML
4 INJECTION, SOLUTION INTRAMUSCULAR; INTRAVENOUS ONCE
Status: ACTIVE | OUTPATIENT
Start: 2021-02-13 | End: 2021-02-14

## 2021-02-13 RX ORDER — ACETAMINOPHEN 325 MG/1
650 TABLET ORAL
Status: DISCONTINUED | OUTPATIENT
Start: 2021-02-13 | End: 2021-02-16 | Stop reason: HOSPADM

## 2021-02-13 RX ORDER — DEXTROSE 50 % IN WATER (D50W) INTRAVENOUS SYRINGE
12.5-25 AS NEEDED
Status: DISCONTINUED | OUTPATIENT
Start: 2021-02-13 | End: 2021-02-16 | Stop reason: HOSPADM

## 2021-02-13 RX ORDER — SODIUM CHLORIDE 0.9 % (FLUSH) 0.9 %
5-40 SYRINGE (ML) INJECTION AS NEEDED
Status: DISCONTINUED | OUTPATIENT
Start: 2021-02-13 | End: 2021-02-16 | Stop reason: HOSPADM

## 2021-02-13 RX ORDER — GUAIFENESIN 100 MG/5ML
324 LIQUID (ML) ORAL
Status: COMPLETED | OUTPATIENT
Start: 2021-02-13 | End: 2021-02-13

## 2021-02-13 RX ORDER — MORPHINE SULFATE 2 MG/ML
2 INJECTION, SOLUTION INTRAMUSCULAR; INTRAVENOUS
Status: DISCONTINUED | OUTPATIENT
Start: 2021-02-13 | End: 2021-02-16 | Stop reason: HOSPADM

## 2021-02-13 RX ORDER — ASPIRIN 81 MG/1
81 TABLET ORAL DAILY
Status: DISCONTINUED | OUTPATIENT
Start: 2021-02-14 | End: 2021-02-16 | Stop reason: HOSPADM

## 2021-02-13 RX ORDER — INSULIN LISPRO 100 [IU]/ML
INJECTION, SOLUTION INTRAVENOUS; SUBCUTANEOUS
Status: DISCONTINUED | OUTPATIENT
Start: 2021-02-13 | End: 2021-02-16 | Stop reason: HOSPADM

## 2021-02-13 RX ORDER — ATORVASTATIN CALCIUM 40 MG/1
40 TABLET, FILM COATED ORAL DAILY
Status: DISCONTINUED | OUTPATIENT
Start: 2021-02-14 | End: 2021-02-16 | Stop reason: HOSPADM

## 2021-02-13 RX ORDER — ACETAMINOPHEN 650 MG/1
650 SUPPOSITORY RECTAL
Status: DISCONTINUED | OUTPATIENT
Start: 2021-02-13 | End: 2021-02-16 | Stop reason: HOSPADM

## 2021-02-13 RX ORDER — ENOXAPARIN SODIUM 100 MG/ML
80 INJECTION SUBCUTANEOUS
Status: COMPLETED | OUTPATIENT
Start: 2021-02-13 | End: 2021-02-13

## 2021-02-13 RX ORDER — ONDANSETRON 2 MG/ML
4 INJECTION INTRAMUSCULAR; INTRAVENOUS
Status: DISCONTINUED | OUTPATIENT
Start: 2021-02-13 | End: 2021-02-13 | Stop reason: SDUPTHER

## 2021-02-13 RX ORDER — GABAPENTIN 100 MG/1
200 CAPSULE ORAL EVERY MORNING
Status: DISCONTINUED | OUTPATIENT
Start: 2021-02-13 | End: 2021-02-16 | Stop reason: HOSPADM

## 2021-02-13 RX ORDER — GABAPENTIN 300 MG/1
600 CAPSULE ORAL
Status: DISCONTINUED | OUTPATIENT
Start: 2021-02-13 | End: 2021-02-16 | Stop reason: HOSPADM

## 2021-02-13 RX ORDER — LANOLIN ALCOHOL/MO/W.PET/CERES
6 CREAM (GRAM) TOPICAL
Status: DISCONTINUED | OUTPATIENT
Start: 2021-02-13 | End: 2021-02-16 | Stop reason: HOSPADM

## 2021-02-13 RX ORDER — ACETAMINOPHEN 325 MG/1
650 TABLET ORAL
Status: DISCONTINUED | OUTPATIENT
Start: 2021-02-13 | End: 2021-02-13 | Stop reason: SDUPTHER

## 2021-02-13 RX ORDER — TERBINAFINE HYDROCHLORIDE 250 MG/1
250 TABLET ORAL DAILY
COMMUNITY

## 2021-02-13 RX ORDER — IPRATROPIUM BROMIDE AND ALBUTEROL SULFATE 2.5; .5 MG/3ML; MG/3ML
3 SOLUTION RESPIRATORY (INHALATION)
Status: COMPLETED | OUTPATIENT
Start: 2021-02-13 | End: 2021-02-13

## 2021-02-13 RX ORDER — CARVEDILOL 12.5 MG/1
12.5 TABLET ORAL 2 TIMES DAILY WITH MEALS
Status: DISCONTINUED | OUTPATIENT
Start: 2021-02-13 | End: 2021-02-13

## 2021-02-13 RX ORDER — NITROGLYCERIN 20 MG/100ML
0-20 INJECTION INTRAVENOUS
Status: DISCONTINUED | OUTPATIENT
Start: 2021-02-13 | End: 2021-02-13

## 2021-02-13 RX ORDER — SODIUM CHLORIDE 9 MG/ML
75 INJECTION, SOLUTION INTRAVENOUS CONTINUOUS
Status: DISCONTINUED | OUTPATIENT
Start: 2021-02-13 | End: 2021-02-13

## 2021-02-13 RX ORDER — ENOXAPARIN SODIUM 100 MG/ML
1 INJECTION SUBCUTANEOUS EVERY 12 HOURS
Status: DISCONTINUED | OUTPATIENT
Start: 2021-02-13 | End: 2021-02-14

## 2021-02-13 RX ORDER — NITROGLYCERIN 0.4 MG/1
0.4 TABLET SUBLINGUAL AS NEEDED
Status: DISCONTINUED | OUTPATIENT
Start: 2021-02-13 | End: 2021-02-16 | Stop reason: HOSPADM

## 2021-02-13 RX ORDER — IPRATROPIUM BROMIDE AND ALBUTEROL SULFATE 2.5; .5 MG/3ML; MG/3ML
3 SOLUTION RESPIRATORY (INHALATION)
Status: DISCONTINUED | OUTPATIENT
Start: 2021-02-13 | End: 2021-02-16 | Stop reason: HOSPADM

## 2021-02-13 RX ORDER — IBUPROFEN 200 MG
1 TABLET ORAL DAILY
Status: DISCONTINUED | OUTPATIENT
Start: 2021-02-13 | End: 2021-02-16 | Stop reason: HOSPADM

## 2021-02-13 RX ORDER — ONDANSETRON 2 MG/ML
4 INJECTION INTRAMUSCULAR; INTRAVENOUS
Status: DISCONTINUED | OUTPATIENT
Start: 2021-02-13 | End: 2021-02-16 | Stop reason: HOSPADM

## 2021-02-13 RX ORDER — SODIUM CHLORIDE 0.9 % (FLUSH) 0.9 %
5-40 SYRINGE (ML) INJECTION EVERY 8 HOURS
Status: DISCONTINUED | OUTPATIENT
Start: 2021-02-13 | End: 2021-02-16 | Stop reason: HOSPADM

## 2021-02-13 RX ORDER — ENOXAPARIN SODIUM 100 MG/ML
1 INJECTION SUBCUTANEOUS EVERY 12 HOURS
Status: DISCONTINUED | OUTPATIENT
Start: 2021-02-13 | End: 2021-02-13

## 2021-02-13 RX ORDER — CARVEDILOL 12.5 MG/1
12.5 TABLET ORAL 2 TIMES DAILY WITH MEALS
Status: DISCONTINUED | OUTPATIENT
Start: 2021-02-13 | End: 2021-02-16 | Stop reason: HOSPADM

## 2021-02-13 RX ORDER — MAGNESIUM SULFATE 100 %
4 CRYSTALS MISCELLANEOUS AS NEEDED
Status: DISCONTINUED | OUTPATIENT
Start: 2021-02-13 | End: 2021-02-16 | Stop reason: HOSPADM

## 2021-02-13 RX ORDER — PROMETHAZINE HYDROCHLORIDE 25 MG/1
12.5 TABLET ORAL
Status: DISCONTINUED | OUTPATIENT
Start: 2021-02-13 | End: 2021-02-16 | Stop reason: HOSPADM

## 2021-02-13 RX ORDER — NITROGLYCERIN 0.4 MG/1
0.4 TABLET SUBLINGUAL
Status: DISCONTINUED | OUTPATIENT
Start: 2021-02-13 | End: 2021-02-13 | Stop reason: HOSPADM

## 2021-02-13 RX ORDER — GABAPENTIN 100 MG/1
200 CAPSULE ORAL EVERY MORNING
Status: DISCONTINUED | OUTPATIENT
Start: 2021-02-14 | End: 2021-02-13

## 2021-02-13 RX ADMIN — Medication 10 ML: at 18:44

## 2021-02-13 RX ADMIN — CARVEDILOL 12.5 MG: 12.5 TABLET, FILM COATED ORAL at 17:49

## 2021-02-13 RX ADMIN — NITROGLYCERIN 0.4 MG: 0.4 TABLET, ORALLY DISINTEGRATING SUBLINGUAL at 15:32

## 2021-02-13 RX ADMIN — GABAPENTIN 200 MG: 100 CAPSULE ORAL at 12:13

## 2021-02-13 RX ADMIN — INSULIN LISPRO 2 UNITS: 100 INJECTION, SOLUTION INTRAVENOUS; SUBCUTANEOUS at 12:13

## 2021-02-13 RX ADMIN — NITROGLYCERIN 0.4 MG: 0.4 TABLET, ORALLY DISINTEGRATING SUBLINGUAL at 15:24

## 2021-02-13 RX ADMIN — CARVEDILOL 12.5 MG: 12.5 TABLET, FILM COATED ORAL at 12:13

## 2021-02-13 RX ADMIN — GABAPENTIN 600 MG: 300 CAPSULE ORAL at 21:44

## 2021-02-13 RX ADMIN — INSULIN LISPRO 2 UNITS: 100 INJECTION, SOLUTION INTRAVENOUS; SUBCUTANEOUS at 18:00

## 2021-02-13 RX ADMIN — NITROGLYCERIN 0.4 MG: 0.4 TABLET SUBLINGUAL at 06:51

## 2021-02-13 RX ADMIN — IPRATROPIUM BROMIDE AND ALBUTEROL SULFATE 3 ML: .5; 3 SOLUTION RESPIRATORY (INHALATION) at 06:55

## 2021-02-13 RX ADMIN — INSULIN LISPRO 2 UNITS: 100 INJECTION, SOLUTION INTRAVENOUS; SUBCUTANEOUS at 21:55

## 2021-02-13 RX ADMIN — SODIUM CHLORIDE 1000 ML: 9 INJECTION, SOLUTION INTRAVENOUS at 06:27

## 2021-02-13 RX ADMIN — NITROGLYCERIN 10 MCG/MIN: 20 INJECTION INTRAVENOUS at 16:14

## 2021-02-13 RX ADMIN — MORPHINE SULFATE 1 MG: 2 INJECTION, SOLUTION INTRAMUSCULAR; INTRAVENOUS at 18:39

## 2021-02-13 RX ADMIN — ENOXAPARIN SODIUM 80 MG: 80 INJECTION SUBCUTANEOUS at 10:23

## 2021-02-13 RX ADMIN — NITROGLYCERIN 0.4 MG: 0.4 TABLET, ORALLY DISINTEGRATING SUBLINGUAL at 15:18

## 2021-02-13 RX ADMIN — NITROGLYCERIN 20 MCG/MIN: 20 INJECTION INTRAVENOUS at 19:42

## 2021-02-13 RX ADMIN — ACETAMINOPHEN 650 MG: 325 TABLET ORAL at 19:42

## 2021-02-13 RX ADMIN — ASPIRIN 81 MG 324 MG: 81 TABLET ORAL at 06:50

## 2021-02-13 RX ADMIN — ENOXAPARIN SODIUM 80 MG: 80 INJECTION SUBCUTANEOUS at 21:45

## 2021-02-13 NOTE — ED NOTES
Pt now reporting that midsternal c/p is radiating into the R side of the chest. Denies any change in SOB or other symptoms. Pt noted to be intermittently Sinus Tach on monitor. MD made aware, orders received for subsequent EKG.

## 2021-02-13 NOTE — ED NOTES
9:05 AM-patient states he is feeling better with his chest discomfort in the substernal area he does feels more short of breath than usual.  Upon obtaining more history the patient states he was at [facility] in 2018 and had a cardiac cath. He states they recommended cardiac bypass. He declined. He was not happy with his care there and left. He has subsequently seen a cardiologist in [place] who recommended further testing and possible repeat cardiac cath. He now lives in the [place] area for the last year. He continues to smoke tobacco.  His symptoms started yesterday and have been stuttering in nature. He states he feels more short of breath than usual.  Plan will be to discuss case with hospitalist and cardiology to see if he meets criteria for possible admission and further cardiac evaluation. 9:19 AM-nargis case with cardiologist Dr. Annika Mosqueda. He recommends we transfer the patient to [facility]. He has concerns about the patient's complaint and may need to perform a cardiac catheterization in the next 24 to 48 hours. Patient was made aware of this plan. He is made aware of the plan to establish ambulance transport. He agrees with transport and having the catheterization if necessary. We will consult the hospitalist at [facility] to assist with transfer. Dr. Annika Mosqueda recommended holding patient's Eliquis and initiating Lovenox for probable planned catheterization. 9:23 AM-patient states he has not taken his blood thinner since yesterday morning. He has had aspirin here. We will give him a dose of Lovenox. Patient was made aware of this and agrees. 9:31 AM discussed case with emergency provider at [facility] Dr. Nadia Fuller. He was made aware of patient's presentation and current clinical findings and vital signs. He accepts the patient in transport.     10:14 AM nargis dosing of Lovenox with pharmacist.  Patient's GFR threatening were noted. Pharmacist recommends Lovenox 1 mg/kg every 12 hours. Patient has not had his Eliquis in 24 hours. Patient is stable for transfer to [facility].

## 2021-02-13 NOTE — Clinical Note
TRANSFER - OUT REPORT:     Verbal report given to: Teddy Mcmanus RN. Report consisted of patient's Situation, Background, Assessment and   Recommendations(SBAR). Opportunity for questions and clarification was provided. Patient transported to: [facility].

## 2021-02-13 NOTE — ED NOTES
Pt wheeled to treatment area via w/c accompanied by ED staff c/o midsternal c/p and SOB that began yesterday. Pt reports two episodes of A fib that included near syncope, SOB, and palpitations. Pt denies breaking out into a sweat, radiating pain or N/V w/ each episode. Pt reports Hx of A FIb which he takes Eliquis for. Hx of COPD, Tobacco Use, Diabetes, and HTN. Cardiac catheterization in 2018 w/ 30% blockage. Denies any fevers, cough, chills or known COVID exposure. Pt not in respiratory distress and afebrile in triage. Emergency Department Nursing Plan of Care       The Nursing Plan of Care is developed from the Nursing assessment and Emergency Department Attending provider initial evaluation. The plan of care may be reviewed in the ED Provider note.     The Plan of Care was developed with the following considerations:   Patient / Family readiness to learn indicated by:verbalized understanding  Persons(s) to be included in education: patient  Barriers to Learning/Limitations:No    Signed     Zbigniew Uriarte    2/13/2021   7:07 AM

## 2021-02-13 NOTE — ED NOTES
Patient put clothes back and packed up medication, stating that he wants to put his medications in his car. Patient accompanied outside by Charge nurse. Patient noted to be smoking a cigarette by Charge nurse. Patient returned and placed on EMS stretcher.

## 2021-02-13 NOTE — ED PROVIDER NOTES
EMERGENCY DEPARTMENT HISTORY AND PHYSICAL EXAM      Date: 2/13/2021  Patient Name: Sharif Yu    History of Presenting Illness     Chief Complaint   Patient presents with    Chest Pain     Transferred from [facility] for cardiac cath tomorrow. C/o CP x several days, better now. Reports cardiac cath in 2018 - advised he needed a CABG but he declined at that time. Rapid Covid swab at [facility] today was negative. History Provided By: Patient    HPI: Sharif Yu, 62 y.o. male with history of diabetes, hypertension, hypercholesterolemia, atrial fibrillation, tobacco abuse, coronary disease presents to the ED with cc of chest pain. Patient transferred from [facility] where he presented earlier today. He developed chest pain over the past few days. He received nitroglycerin at [facility] earlier today and states his chest pain is now completely resolved. Cardiology was consulted and Dr. Jeramy Robison recommended patient be transferred over to our facility for further management and likely catheterization. There were no free beds and so patient was directed to the emergency department. He continues to have no further chest pain at this time and has no complaints upon arrival.    There are no other complaints, changes, or physical findings at this time.     PCP: Gualberto Claire MD    Current Facility-Administered Medications on File Prior to Encounter   Medication Dose Route Frequency Provider Last Rate Last Admin    [COMPLETED] albuterol-ipratropium (DUO-NEB) 2.5 MG-0.5 MG/3 ML  3 mL Nebulization NOW Benjamin Diaz DO   3 mL at 02/13/21 0655    [COMPLETED] sodium chloride 0.9 % bolus infusion 1,000 mL  1,000 mL IntraVENous Govind Fenrandes DO   Stopped at 02/13/21 3666    [COMPLETED] aspirin chewable tablet 324 mg  324 mg Oral NOW Benjamin Diaz DO   324 mg at 02/13/21 0650    [COMPLETED] enoxaparin (LOVENOX) injection 80 mg  80 mg SubCUTAneous NOW Lurdes Mast MD   80 mg at 02/13/21 1023  [DISCONTINUED] nitroglycerin (NITROSTAT) tablet 0.4 mg  0.4 mg SubLINGual Q5MIN PRN Estrellita Trejo DO   0.4 mg at 02/13/21 9079     Current Outpatient Medications on File Prior to Encounter   Medication Sig Dispense Refill    terbinafine HCL (LAMISIL) 250 mg tablet Take 250 mg by mouth daily.  glipiZIDE SR (GLUCOTROL XL) 10 mg CR tablet Take 1 Tab by mouth two (2) times a day. Indications: type 2 diabetes mellitus 180 Tab 0    gabapentin (NEURONTIN) 100 mg capsule Take 2 Caps by mouth Every morning. Max Daily Amount: 200 mg. 180 Cap 0    gabapentin (NEURONTIN) 300 mg capsule Take 2 Caps by mouth nightly. Max Daily Amount: 600 mg. 180 Cap 0    tadalafiL (CIALIS) 20 mg tablet Take 1 Tab by mouth as needed for Erectile Dysfunction. 30 Tab 3    aspirin delayed-release 81 mg tablet Take 1 Tab by mouth daily. 90 Tab 3    atorvastatin (LIPITOR) 40 mg tablet Take 1 Tab by mouth daily. Indications: excessive fat in the blood 90 Tab 3    carvediloL (COREG) 12.5 mg tablet Take 1 Tab by mouth two (2) times daily (with meals). Indications: atrial fibrillation 180 Tab 3    lisinopriL (PRINIVIL, ZESTRIL) 10 mg tablet Take 1 Tab by mouth daily. Indications: high blood pressure 90 Tab 3    metFORMIN (GLUCOPHAGE) 1,000 mg tablet Take 1 Tab by mouth two (2) times daily (with meals). Indications: type 2 diabetes mellitus 180 Tab 3    sildenafiL, pulmonary hypertension, (REVATIO) 20 mg tablet Take 1 Tab by mouth daily. 90 Tab 3    apixaban (Eliquis) 5 mg tablet Take 1 Tab by mouth two (2) times a day. Indications: treatment to prevent blood clots in chronic atrial fibrillation 180 Tab 3    multivitamin (ONE A DAY) tablet Take 1 Tab by mouth daily.          Past History     Past Medical History:  Past Medical History:   Diagnosis Date    CAD (coronary artery disease)     Atrial Fibrillation     Chronic obstructive pulmonary disease [address] [address]     Diabetes ([facility] [address]     Hypertension        Past Surgical History:  Past Surgical History:   Procedure Laterality Date    HX ANGIOPLASTY      lt SFA angioplasty.  HX HEENT      Right ear bone removal    HX OTHER SURGICAL      Left vascular surgery, Clot removal?    VASCULAR SURGERY PROCEDURE UNLIST         Family History:  Family History   Problem Relation Age of Onset    Heart Disease Mother         triple bypass     Cancer Sister     Heart Disease Sister     Diabetes Brother     Cancer Sister        Social History:  Social History     Tobacco Use    Smoking status: Current Every Day Smoker     Packs/day: 1.50    Smokeless tobacco: Never Used    Tobacco comment: 1.5 pack    Substance Use Topics    Alcohol use: Not Currently     Comment: quit 38 years     Drug use: Not Currently       Allergies: Allergies   Allergen Reactions    Penicillin G Unknown (comments)     When he was a young child Mother told him he was allergic         Review of Systems   Review of Systems   Constitutional: Negative for chills and fever. Respiratory: Negative for shortness of breath. Cardiovascular: Positive for chest pain (resolved). Gastrointestinal: Negative for abdominal pain, nausea and vomiting. Neurological: Negative for light-headedness. All other systems reviewed and are negative. Physical Exam   Physical Exam  Vitals signs and nursing note reviewed. Constitutional:       General: He is not in acute distress. Appearance: Normal appearance. He is not ill-appearing, toxic-appearing or diaphoretic. HENT:      Head: Normocephalic and atraumatic. Cardiovascular:      Rate and Rhythm: Normal rate and regular rhythm. Heart sounds: Normal heart sounds. No murmur. Pulmonary:      Effort: Pulmonary effort is normal. No respiratory distress. Breath sounds: Normal breath sounds. No wheezing. Abdominal:      Palpations: Abdomen is soft. Tenderness: There is no abdominal tenderness. There is no guarding or rebound.    Skin:     General: Skin is warm and dry.      Findings: No erythema or rash. Neurological:      General: No focal deficit present. Mental Status: He is alert and oriented to person, place, and time. Diagnostic Study Results     Labs -     Recent Results (from the past 12 hour(s))   CBC WITH AUTOMATED DIFF    Collection Time: 02/13/21  6:27 AM   Result Value Ref Range    WBC 10.3 4.1 - 11.1 K/uL    RBC 5.26 4.10 - 5.70 M/uL    HGB 15.4 12.1 - 17.0 g/dL    HCT 45.6 36.6 - 50.3 %    MCV 86.7 80.0 - 99.0 FL    MCH 29.3 26.0 - 34.0 PG    MCHC 33.8 30.0 - 36.5 g/dL    RDW 12.4 11.5 - 14.5 %    PLATELET 725 254 - 751 K/uL    MPV 11.5 8.9 - 12.9 FL    NRBC 0.0 0  WBC    ABSOLUTE NRBC 0.00 0.00 - 0.01 K/uL    NEUTROPHILS 81 (H) 32 - 75 %    LYMPHOCYTES 11 (L) 12 - 49 %    MONOCYTES 5 5 - 13 %    EOSINOPHILS 2 0 - 7 %    BASOPHILS 0 0 - 1 %    IMMATURE GRANULOCYTES 1 (H) 0.0 - 0.5 %    ABS. NEUTROPHILS 8.3 (H) 1.8 - 8.0 K/UL    ABS. LYMPHOCYTES 1.2 0.8 - 3.5 K/UL    ABS. MONOCYTES 0.5 0.0 - 1.0 K/UL    ABS. EOSINOPHILS 0.2 0.0 - 0.4 K/UL    ABS. BASOPHILS 0.0 0.0 - 0.1 K/UL    ABS. IMM. GRANS. 0.1 (H) 0.00 - 0.04 K/UL    DF AUTOMATED     METABOLIC PANEL, COMPREHENSIVE    Collection Time: 02/13/21  6:27 AM   Result Value Ref Range    Sodium 139 136 - 145 mmol/L    Potassium 4.4 3.5 - 5.1 mmol/L    Chloride 103 97 - 108 mmol/L    CO2 25 21 - 32 mmol/L    Anion gap 11 5 - 15 mmol/L    Glucose 271 (H) 65 - 100 mg/dL    BUN 17 6 - 20 MG/DL    Creatinine 1.48 (H) 0.70 - 1.30 MG/DL    BUN/Creatinine ratio 11 (L) 12 - 20      GFR est AA 59 (L) >60 ml/min/1.73m2    GFR est non-AA 49 (L) >60 ml/min/1.73m2    Calcium 9.2 8.5 - 10.1 MG/DL    Bilirubin, total 0.3 0.2 - 1.0 MG/DL    ALT (SGPT) 18 12 - 78 U/L    AST (SGOT) 8 (L) 15 - 37 U/L    Alk.  phosphatase 65 45 - 117 U/L    Protein, total 6.8 6.4 - 8.2 g/dL    Albumin 3.7 3.5 - 5.0 g/dL    Globulin 3.1 2.0 - 4.0 g/dL    A-G Ratio 1.2 1.1 - 2.2     MAGNESIUM    Collection Time: 02/13/21  6:27 AM Result Value Ref Range    Magnesium 1.9 1.6 - 2.4 mg/dL   PROTHROMBIN TIME + INR    Collection Time: 02/13/21  6:27 AM   Result Value Ref Range    INR 1.0 0.9 - 1.1      Prothrombin time 9.7 9.0 - 11.1 sec   TROPONIN I    Collection Time: 02/13/21  6:27 AM   Result Value Ref Range    Troponin-I, Qt. <0.05 <0.05 ng/mL   EKG, 12 LEAD, SUBSEQUENT    Collection Time: 02/13/21  6:44 AM   Result Value Ref Range    Ventricular Rate 99 BPM    Atrial Rate 99 BPM    P-R Interval 168 ms    QRS Duration 90 ms    Q-T Interval 336 ms    QTC Calculation (Bezet) 431 ms    Calculated P Axis 64 degrees    Calculated R Axis 71 degrees    Calculated T Axis 69 degrees    Diagnosis       Normal sinus rhythm  Low voltage QRS  Borderline ECG  When compared with ECG of 13-FEB-2021 06:20,  MANUAL COMPARISON REQUIRED, DATA IS UNCONFIRMED     SARS-COV-2    Collection Time: 02/13/21  7:30 AM   Result Value Ref Range    SARS-CoV-2 Please find results under separate order     COVID-19 RAPID TEST    Collection Time: 02/13/21  7:30 AM   Result Value Ref Range    Specimen source Nasopharyngeal      COVID-19 rapid test Not detected NOTD     TROPONIN I    Collection Time: 02/13/21  9:09 AM   Result Value Ref Range    Troponin-I, Qt. <0.05 <0.05 ng/mL   NT-PRO BNP    Collection Time: 02/13/21  9:09 AM   Result Value Ref Range    NT pro-BNP 65 0 - 125 PG/ML   GLUCOSE, POC    Collection Time: 02/13/21  9:28 AM   Result Value Ref Range    Glucose (POC) 222 (H) 65 - 100 mg/dL    Performed by Dionicio Rodgers    EKG, 12 LEAD, INITIAL    Collection Time: 02/13/21 11:06 AM   Result Value Ref Range    Ventricular Rate 91 BPM    Atrial Rate 91 BPM    P-R Interval 144 ms    QRS Duration 90 ms    Q-T Interval 354 ms    QTC Calculation (Bezet) 435 ms    Calculated P Axis 38 degrees    Calculated R Axis 9 degrees    Calculated T Axis 51 degrees    Diagnosis       Normal sinus rhythm  Possible Inferior infarct , age undetermined  When compared with ECG of 13-FEB-2021 06:44,  MANUAL COMPARISON REQUIRED, DATA IS UNCONFIRMED         Radiologic Studies -   No orders to display     CT Results  (Last 48 hours)    None        CXR Results  (Last 48 hours)               02/13/21 0709  XR CHEST PORT Final result    Impression:  Minimal diffuse interstitial prominence may represent minimal edema   or atypical viral pneumonia. Narrative: Indication: Shortness of breath       Comparison: None       Portable exam of the chest obtained at 701 demonstrates normal heart size. Minimal diffuse interstitial prominence is noted without focal infiltrate. The   osseous structures are unremarkable. Medical Decision Making   I am the first provider for this patient. I reviewed the vital signs, available nursing notes, past medical history, past surgical history, family history and social history. Vital Signs-Reviewed the patient's vital signs. Patient Vitals for the past 12 hrs:   Temp Pulse Resp BP SpO2   02/13/21 1100  89 13 117/79 97 %   02/13/21 1050 98.1 °F (36.7 °C) 88  (!) 146/115 96 %       Records Reviewed: Nursing Notes, Old Medical Records, Previous Radiology Studies and Previous Laboratory Studies   I reviewed ED provider records from [facility] dated today. Provider Notes (Medical Decision Making):   52-year-old gentleman here with unstable angina, chest pain currently 0/10 after nitroglycerin received at outside hospital.  I performed EKG upon arrival here, he has no new ischemic changes. He has no new chest pain. I will put in for cardiology consultation and discussed with hospitalist for admission. There is no further need for emergent intervention at this time. ED Course:   Initial assessment performed. The patients presenting problems have been discussed, and they are in agreement with the care plan formulated and outlined with them.   I have encouraged them to ask questions as they arise throughout their visit.    ED Course as of Feb 13 1127   Sat Feb 13, 2021   1122 EKG per my interpretation normal sinus rhythm, rate 91 bpm, normal axis, no acute ischemic changes, no interval changes. [AK]      ED Course User Index  [AK] Tyler Cary MD         Admission Note:  Patient is being admitted to the hospital by Dr. Crowder Records, Service: Hospitalist.  The results of their tests and reasons for their admission have been discussed with them and available family. They convey agreement and understanding for the need to be admitted and for their admission diagnosis. '      Disposition:  Admit      Diagnosis     Clinical Impression:   1. Unstable angina [facility])        Attestations:    Celeste Bai MD    Please note that this dictation was completed with [email or web address], the computer voice recognition software. Quite often unanticipated grammatical, syntax, homophones, and other interpretive errors are inadvertently transcribed by the computer software. Please disregard these errors. Please excuse any errors that have escaped final proofreading. Thank you.

## 2021-02-13 NOTE — ED NOTES
Bedside shift change report given to Emily Kaufman RN (oncoming nurse) by Zack Keenan RN (offgoing nurse). Report included the following information SBAR, Kardex, ED Summary, Intake/Output, MAR and Recent Results, Cardiac Rhythm, EKG, VS, and Pain.

## 2021-02-13 NOTE — ED PROVIDER NOTES
EMERGENCY DEPARTMENT HISTORY AND PHYSICAL EXAM      Date: 2/13/2021  Patient Name: Ross Cordero    Please note that this dictation was completed with [employer], the computer voice recognition software. Quite often unanticipated grammatical, syntax, homophones, and other interpretive errors are inadvertently transcribed by the computer software. Please disregard these errors. Please excuse any errors that have escaped final proofreading. History of Presenting Illness     Chief Complaint   Patient presents with    Chest Pain    Shortness of Breath       History Provided By: Patient     HPI: Ross Cordero, 62 y.o. male, with a past medical history significant for non-insulin-dependent diabetes, hypertension, hypercholesterolemia, atrial fibrillation, tobacco abuse presenting to the emergency department complaining of shortness of breath and palpitations. Patient reports feeling like he has had several episodes of fast heart rate which he believes is atrial fibrillation with RVR over the last 24 hours. This morning he felt like his heart was racing and had some discomfort in the left posterior thoracic wall so he decided to come in for evaluation. Denies any retrosternal chest pain. Discomfort in the left lateral and posterior chest wall worsened with palpation, movement. Admits to shortness of breath and palpitations. Does admit to a cough, but not changed from his chronic cough secondary to smoking  Denies any nausea or diaphoresis. No radiation of the chest discomfort. Patient reports compliance with his medications. He is on carvedilol and Eliquis for his atrial fibrillation. He denies any fever, denies any new or changing production to his cough. No unilateral leg pain, no lower extremity edema. no prior history of DVT or PE.    PCP: Efrain Baez MD    No current facility-administered medications on file prior to encounter.       Current Outpatient Medications on File Prior to Encounter Medication Sig Dispense Refill    terbinafine HCL (LAMISIL) 250 mg tablet Take 250 mg by mouth daily.  glipiZIDE SR (GLUCOTROL XL) 10 mg CR tablet Take 1 Tab by mouth two (2) times a day. Indications: type 2 diabetes mellitus 180 Tab 0    gabapentin (NEURONTIN) 100 mg capsule Take 2 Caps by mouth Every morning. Max Daily Amount: 200 mg. 180 Cap 0    gabapentin (NEURONTIN) 300 mg capsule Take 2 Caps by mouth nightly. Max Daily Amount: 600 mg. 180 Cap 0    atorvastatin (LIPITOR) 40 mg tablet Take 1 Tab by mouth daily. Indications: excessive fat in the blood 90 Tab 3    carvediloL (COREG) 12.5 mg tablet Take 1 Tab by mouth two (2) times daily (with meals). Indications: atrial fibrillation 180 Tab 3    lisinopriL (PRINIVIL, ZESTRIL) 10 mg tablet Take 1 Tab by mouth daily. Indications: high blood pressure 90 Tab 3    metFORMIN (GLUCOPHAGE) 1,000 mg tablet Take 1 Tab by mouth two (2) times daily (with meals). Indications: type 2 diabetes mellitus 180 Tab 3    apixaban (Eliquis) 5 mg tablet Take 1 Tab by mouth two (2) times a day. Indications: treatment to prevent blood clots in chronic atrial fibrillation 180 Tab 3    aspirin delayed-release 81 mg tablet Take 1 Tab by mouth daily. 90 Tab 3    multivitamin (ONE A DAY) tablet Take 1 Tab by mouth daily. Past History     Past Medical History:  Past Medical History:   Diagnosis Date    CAD (coronary artery disease)     Atrial Fibrillation     [facility] [address]     Diabetes ([facility] [address]     Hypertension        Past Surgical History:  Past Surgical History:   Procedure Laterality Date    HX ANGIOPLASTY      lt SFA angioplasty.     HX HEENT      Right ear bone removal    HX OTHER SURGICAL      Left vascular surgery, Clot removal?    VASCULAR SURGERY PROCEDURE UNLIST         Family History:  Family History   Problem Relation Age of Onset    Heart Disease Mother         triple bypass     Cancer Sister     Heart Disease Sister  Diabetes Brother     Cancer Sister        Social History:  Social History     Tobacco Use    Smoking status: Current Every Day Smoker     Packs/day: 1.50    Smokeless tobacco: Never Used    Tobacco comment: 1.5 pack    Substance Use Topics    Alcohol use: Not Currently     Comment: quit 38 years     Drug use: Not Currently       Allergies: Allergies   Allergen Reactions    Penicillin G Unknown (comments)     When he was a young child Mother told him he was allergic         Review of Systems   Review of Systems   Constitutional: Negative for chills and fever. HENT: Negative for congestion and sore throat. Eyes: Negative for visual disturbance. Respiratory: Positive for chest tightness and shortness of breath. Negative for cough. Cardiovascular: Positive for palpitations. Negative for chest pain and leg swelling. Gastrointestinal: Negative for abdominal pain, blood in stool, diarrhea and nausea. Endocrine: Negative for polyuria. Elevated BS for the last week   Genitourinary: Negative for dysuria and testicular pain. Musculoskeletal: Negative for arthralgias, joint swelling and myalgias. Skin: Negative for rash. Allergic/Immunologic: Negative for immunocompromised state. Neurological: Negative for weakness and headaches. Hematological: Does not bruise/bleed easily. Psychiatric/Behavioral: Negative for confusion. Physical Exam   Physical Exam  Vitals signs and nursing note reviewed. Constitutional:       Appearance: He is well-developed. Comments: Nontoxic-appearing male   HENT:      Head: Normocephalic and atraumatic. Eyes:      General:         Right eye: No discharge. Left eye: No discharge. Conjunctiva/sclera: Conjunctivae normal.      Pupils: Pupils are equal, round, and reactive to light. Neck:      Musculoskeletal: Normal range of motion and neck supple. Trachea: No tracheal deviation.    Cardiovascular:      Rate and Rhythm: Regular rhythm. Tachycardia present. Heart sounds: Normal heart sounds. No murmur. Pulmonary:      Effort: Pulmonary effort is normal. No respiratory distress. Breath sounds: Wheezing present. No rales. Comments: Mild wheeze bilaterally, no accessory muscle use, no respiratory distress  Abdominal:      General: Bowel sounds are normal.      Palpations: Abdomen is soft. Tenderness: There is no abdominal tenderness. There is no guarding or rebound. Musculoskeletal: Normal range of motion. General: No tenderness or deformity. Skin:     General: Skin is warm and dry. Findings: No erythema or rash. Neurological:      Mental Status: He is alert and oriented to person, place, and time.    Psychiatric:         Behavior: Behavior normal.         Diagnostic Study Results     Labs -     Recent Results (from the past 12 hour(s))   ECHO ADULT COMPLETE    Collection Time: 02/13/21  3:05 PM   Result Value Ref Range    IVSd 0.93 0.6 - 1.0 cm    LVIDd 4.68 4.2 - 5.9 cm    LVIDs 3.56 cm    LVOT d 1.77 cm    LVPWd 0.85 0.6 - 1.0 cm    LVOT Peak Gradient 3.71 mmHg    LVOT Peak Velocity 96.28 cm/s    Left Atrium Major Axis 2.81 cm    Aortic Valve Area by Continuity of Peak Velocity 2.24 cm2    AoV PG 4.44 mmHg    Aortic Valve Systolic Peak Velocity 708.28 cm/s    MV A Asif 103.76 cm/s    Mitral Valve E Wave Deceleration Time 188.34 ms    MV E Asif 75.85 cm/s    E/E' ratio (averaged) 11.13     E/E' lateral 11.39     E/E' septal 10.87     LV E' Lateral Velocity 6.66 cm/s    LV E' Septal Velocity 6.98 cm/s    Pulmonic Valve Systolic Peak Instantaneous Gradient 3.12 mmHg    Pulmonic Valve Max Velocity 88.27 cm/s    Ao Root D 3.13 cm    MV E/A 0.73     LV Mass .6 88 - 224 g    LV Mass AL Index 71.6 49 - 115 g/m2    Left Atrium Minor Axis 1.44 cm    QIANA/BSA Pk Asif 1.1 cm2/m2   EKG, 12 LEAD, INITIAL    Collection Time: 02/13/21  3:29 PM   Result Value Ref Range    Ventricular Rate 96 BPM    Atrial Rate 96 BPM    P-R Interval 152 ms    QRS Duration 86 ms    Q-T Interval 338 ms    QTC Calculation (Bezet) 427 ms    Calculated P Axis 79 degrees    Calculated R Axis 77 degrees    Calculated T Axis 75 degrees    Diagnosis       Normal sinus rhythm  Possible Anterior infarct , age undetermined  When compared with ECG of 13-FEB-2021 11:06,  MANUAL COMPARISON REQUIRED, DATA IS UNCONFIRMED     POC TROPONIN-I    Collection Time: 02/13/21  4:14 PM   Result Value Ref Range    Troponin-I (POC) <0.04 0.00 - 0.08 ng/mL   GLUCOSE, POC    Collection Time: 02/13/21  5:46 PM   Result Value Ref Range    Glucose (POC) 167 (H) 65 - 100 mg/dL    Performed by Idalia Portillo    TROPONIN I    Collection Time: 02/13/21  6:05 PM   Result Value Ref Range    Troponin-I, Qt. <0.05 <0.05 ng/mL   GLUCOSE, POC    Collection Time: 02/13/21  9:47 PM   Result Value Ref Range    Glucose (POC) 180 (H) 65 - 100 mg/dL    Performed by Jimmy ZAMORA        Radiologic Studies -   XR CHEST PORT   Final Result   Minimal diffuse interstitial prominence may represent minimal edema   or atypical viral pneumonia. CT Results  (Last 48 hours)    None        CXR Results  (Last 48 hours)               02/13/21 0709  XR CHEST PORT Final result    Impression:  Minimal diffuse interstitial prominence may represent minimal edema   or atypical viral pneumonia. Narrative: Indication: Shortness of breath       Comparison: None       Portable exam of the chest obtained at 701 demonstrates normal heart size. Minimal diffuse interstitial prominence is noted without focal infiltrate. The   osseous structures are unremarkable. Medical Decision Making   I am the first provider for this patient. I reviewed the vital signs, available nursing notes, past medical history, past surgical history, family history and social history. Vital Signs-Reviewed the patient's vital signs. No data found.     EKG interpretation: (Preliminary)  Time 6:20 AM: EKG shows sinus tachycardia, normal axis. Normal intervals. No evidence of acute ischemic ST or T wave changes. Interpreted by me    Records Reviewed:   Nursing notes, Prior visits     Primary care provider notes reviewed. PCP note from 12/7 reviewed patient has diabetes, peripheral vascular disease, and hypertension. Had stenting to the SFA in the past.    Provider Notes (Medical Decision Making):   Patient presenting with shortness of breath, palpitations and chest discomfort. Chest discomfort slightly atypical for ACS, but patient has multiple risk factors   Including diabetes, hypertension, hyperlipidemia, tobacco abuse. He is wheezing on exam, may be related to COPD exacerbation, differential also includes pneumothorax. Will check multiple ECGs, will obtain labs including cardiac biomarkers, chest x-ray to assess for pneumonia, pneumothorax. Considered aortic dissection, but this seems unlikely based off the presentation, no need for CTA at this time based off history and physical. Will give ASA given possible concern for ACS. ED Course:   Initial assessment performed. The patients presenting problems have been discussed, and they are in agreement with the care plan formulated and outlined with them. I have encouraged them to ask questions as they arise throughout their visit. 6:35 AM  Patient has been here for about 15 minutes, he is now complaining of pain in the right chest wall that began he reports 5 minutes ago, described as a dull ache. .  This is after our first EKG. We will go ahead and order subsequent EKG, cardiac biomarker sent. Patient remains in sinus tach rhythm on the monitor. Heart rate 106. Will try nitro for chest discomfort (last took tadalafil 3-4 days ago). 0700  Patient care will be transferred to Dr. Lor Harrison. Discussed available diagnostic results and care plan at length. Pt made aware of provider change.  Patient still awaiting cardiac biomarkers x2, cxr, and reassessment. Critical Care Time:   none    Disposition:  Patient transferred to ED [facility]      PLAN:  1. Discharge Medication List as of 2/13/2021 10:31 AM        2. Follow-up Information    None         Return to ED if worse     Diagnosis     Clinical Impression:   1. Unstable angina ([address]    2. Hyperglycemia        Attestations:   This note was completed by Socrates Salinas DO

## 2021-02-13 NOTE — H&P
Hospitalist Admission Note    NAME: Monika Barba   :  1960   MRN:  [id number]     Date/Time:  2021 11:33 AM    Patient PCP: Fabiano Mosqueda MD  ______________________________________________________________________  Given the patient's current clinical presentation, I have a high level of concern for decompensation if discharged from the emergency department. Complex decision making was performed, which includes reviewing the patient's available past medical records, laboratory results, and x-ray films. My assessment of this patient's clinical condition and my plan of care is as follows. Assessment / Plan:  Unstable angina, POA  CAD with history of three-vessel disease, declined CABG in 2018  History of A. Fib  HTN  Chest x-ray -minimal diffuse interstitial prominence may represent minimal edema  or atypical viral pneumonia. EKG negative for acute ischemia, troponin negative x2 we will trend  Rapid Covid19  Negative  Check probnp  Obtain echo  Continue ASA/statin, Coreg  Hold Eliquis. Start full dose Lovenox twice daily  Check A1c, lipid panel  SLN, morphine prn  O2 suppl prn  Consult cardiology, discussed with Dr. Flaquita Villa. Plan for cath on either tomorrow or Monday pending clinical course    Type 2 diabetes with neuropathy  PAD/History of left SFA angioplasty  Hold p.o. meds, start SSI  Continue Neurontin    Tobacco use  Cessation counseled, nicotine patch    Mild elevation of cr  Monitor. Avoid nephrotoxic agents      Code Status: Full  Surrogate Decision Maker: Patient's son and brother  DVT Prophylaxis:  lovenox  GI Prophylaxis: not indicated  Baseline: independent      Subjective:   CHIEF COMPLAINT: chest discomfort. HISTORY OF PRESENT ILLNESS:     Monika Barba is a 62 y.o.    male with PMHx significant for CAD, type 2 diabetes with neuropathy, hypertension, hyperlipidemia, history of A. fib, active tobacco use, presented to [facility] for evaluation of SOB and palpitations. Symptoms worst this morning where he felt his heart was coming out of his chest followed by chest discomfort. Symptom onset was several days ago associated with palpitations. Patient has history of CAD and was evaluated at [address] [place] [address] in 2018 and was offered CABG. He declined CABG at the time and lost to follow-up with any cardiologist.  He follows closely with his PCP. Patient was subsequently transferred to [facility] for further evaluation. He received ASA and SLN prior to his transfer. CP resolved. He currently has no complaints. Vitals/labs/imaging reviewed  We were asked to admit for work up and evaluation of the above problems. Past Medical History:   Diagnosis Date    CAD (coronary artery disease)     Atrial Fibrillation     [facility]     Diabetes ([facility]     Hypertension         Past Surgical History:   Procedure Laterality Date    HX ANGIOPLASTY      lt SFA angioplasty.  HX HEENT      Right ear bone removal    HX OTHER SURGICAL      Left vascular surgery, Clot removal?    VASCULAR SURGERY PROCEDURE UNLIST         Social History     Tobacco Use    Smoking status: Current Every Day Smoker     Packs/day: 1.50    Smokeless tobacco: Never Used    Tobacco comment: 1.5 pack    Substance Use Topics    Alcohol use: Not Currently     Comment: quit 45 years         Family History   Problem Relation Age of Onset    Heart Disease Mother         triple bypass     Cancer Sister     Heart Disease Sister     Diabetes Brother     Cancer Sister      Allergies   Allergen Reactions    Penicillin G Unknown (comments)     When he was a young child Mother told him he was allergic        Prior to Admission medications    Medication Sig Start Date End Date Taking? Authorizing Provider   terbinafine HCL (LAMISIL) 250 mg tablet Take 250 mg by mouth daily.     Other, MD Johanny   glipiZIDE SR (GLUCOTROL XL) 10 mg CR tablet Take 1 Tab by mouth two (2) times a day. Indications: type 2 diabetes mellitus 12/31/20   Emilia Valencia MD   gabapentin (NEURONTIN) 100 mg capsule Take 2 Caps by mouth Every morning. Max Daily Amount: 200 mg. 12/30/20   Gregorio Bueno MD   gabapentin (NEURONTIN) 300 mg capsule Take 2 Caps by mouth nightly. Max Daily Amount: 600 mg. 12/30/20   Emilia Valencia MD   aspirin delayed-release 81 mg tablet Take 1 Tab by mouth daily. 6/42/81   Santi Hill MD   atorvastatin (LIPITOR) 40 mg tablet Take 1 Tab by mouth daily. Indications: excessive fat in the blood 9/77/55   Santi Hill MD   carvediloL (COREG) 12.5 mg tablet Take 1 Tab by mouth two (2) times daily (with meals). Indications: atrial fibrillation 1/50/52   Santi Hill MD   lisinopriL (PRINIVIL, ZESTRIL) 10 mg tablet Take 1 Tab by mouth daily. Indications: high blood pressure 6/95/21   Santi Hill MD   metFORMIN (GLUCOPHAGE) 1,000 mg tablet Take 1 Tab by mouth two (2) times daily (with meals). Indications: type 2 diabetes mellitus 0/61/54   Santi Hill MD   apixaban (Eliquis) 5 mg tablet Take 1 Tab by mouth two (2) times a day. Indications: treatment to prevent blood clots in chronic atrial fibrillation 9/53/88   Santi Hill MD   multivitamin (ONE A DAY) tablet Take 1 Tab by mouth daily. Provider, Historical       REVIEW OF SYSTEMS:     I am not able to complete the review of systems because:    The patient is intubated and sedated    The patient has altered mental status due to his acute medical problems    The patient has baseline aphasia from prior stroke(s)    The patient has baseline dementia and is not reliable historian    The patient is in acute medical distress and unable to provide information           Total of 12 systems reviewed as follows:       POSITIVE= BOLD text  Negative = text not BOLD  General:  fever, chills, sweats, generalized weakness, weight loss/gain,      loss of appetite   Eyes:    blurred vision, eye pain, loss of vision, double vision  ENT:    rhinorrhea, pharyngitis   Respiratory:   cough, sputum production, SOB, COCHRAN, wheezing, pleuritic pain   Cardiology:   chest pain, palpitations, orthopnea, PND, edema, syncope   Gastrointestinal:  abdominal pain , N/V, diarrhea, dysphagia, constipation, bleeding   Genitourinary:  frequency, urgency, dysuria, hematuria, incontinence   Muskuloskeletal :  arthralgia, myalgia, back pain  Hematology:  easy bruising, nose or gum bleeding, lymphadenopathy   Dermatological: rash, ulceration, pruritis, color change / jaundice  Endocrine:   hot flashes or polydipsia   Neurological:  headache, dizziness, confusion, focal weakness, paresthesia,     Speech difficulties, memory loss, gait difficulty  Psychological: Feelings of anxiety, depression, agitation    Objective:   VITALS:    Visit Vitals  /79   Pulse 89   Temp 98.1 °F (36.7 °C)   Resp 13   Ht 5' 9\" (1.753 m)   Wt 79.3 kg (174 lb 13.2 oz)   SpO2 97%   BMI 25.82 kg/m²       PHYSICAL EXAM:    General:    Alert, cooperative, no distress, appears stated age. HEENT: Atraumatic, anicteric sclerae, pink conjunctivae     No oral ulcers, mucosa moist, throat clear  Neck:  Supple, symmetrical,  thyroid: non tender  Lungs:   CTA b/l. No wheezing or Rhonchi. No rales. Chest wall:  No tenderness. No accessory muscle use. Heart:   Regular  rhythm,  No  Murmur. No edema  Abdomen:   Soft, NT. ND  BS+  Extremities: No cyanosis. No clubbing,      Skin turgor normal, Radial dial pulse 2+. Capillary refill normal  Skin:     Not pale. Not Jaundiced  No rashes   Psych:  Not depressed. Not anxious or agitated. Neurologic: No facial asymmetry. No aphasia or slurred speech. Symmetrical strength, Sensation grossly intact.  AAOx4.     _______________________________________________________________________  Care Plan discussed with:    Comments   Patient x    Family      RN x    Care Manager                    Consultant:  x ED physician _______________________________________________________________________  Expected  Disposition:   Home with Family    HH/PT/OT/RN x   SNF/LTC    BRADLY    ________________________________________________________________________  TOTAL TIME:  72 Minutes    Critical Care Provided     Minutes non procedure based      Comments    x Reviewed previous records   >50% of visit spent in counseling and coordination of care x Discussion with patient and/or family and questions answered       ________________________________________________________________________  Signed: Tom Lindquist MD    Procedures: see electronic medical records for all procedures/Xrays and details which were not copied into this note but were reviewed prior to creation of Plan. LAB DATA REVIEWED:    Recent Results (from the past 24 hour(s))   CBC WITH AUTOMATED DIFF    Collection Time: 02/13/21  6:27 AM   Result Value Ref Range    WBC 10.3 4.1 - 11.1 K/uL    RBC 5.26 4.10 - 5.70 M/uL    HGB 15.4 12.1 - 17.0 g/dL    HCT 45.6 36.6 - 50.3 %    MCV 86.7 80.0 - 99.0 FL    MCH 29.3 26.0 - 34.0 PG    MCHC 33.8 30.0 - 36.5 g/dL    RDW 12.4 11.5 - 14.5 %    PLATELET 130 896 - 467 K/uL    MPV 11.5 8.9 - 12.9 FL    NRBC 0.0 0  WBC    ABSOLUTE NRBC 0.00 0.00 - 0.01 K/uL    NEUTROPHILS 81 (H) 32 - 75 %    LYMPHOCYTES 11 (L) 12 - 49 %    MONOCYTES 5 5 - 13 %    EOSINOPHILS 2 0 - 7 %    BASOPHILS 0 0 - 1 %    IMMATURE GRANULOCYTES 1 (H) 0.0 - 0.5 %    ABS. NEUTROPHILS 8.3 (H) 1.8 - 8.0 K/UL    ABS. LYMPHOCYTES 1.2 0.8 - 3.5 K/UL    ABS. MONOCYTES 0.5 0.0 - 1.0 K/UL    ABS. EOSINOPHILS 0.2 0.0 - 0.4 K/UL    ABS. BASOPHILS 0.0 0.0 - 0.1 K/UL    ABS. IMM.  GRANS. 0.1 (H) 0.00 - 0.04 K/UL    DF AUTOMATED     METABOLIC PANEL, COMPREHENSIVE    Collection Time: 02/13/21  6:27 AM   Result Value Ref Range    Sodium 139 136 - 145 mmol/L    Potassium 4.4 3.5 - 5.1 mmol/L    Chloride 103 97 - 108 mmol/L    CO2 25 21 - 32 mmol/L    Anion gap 11 5 - 15 mmol/L    Glucose 271 (H) 65 - 100 mg/dL    BUN 17 6 - 20 MG/DL    Creatinine 1.48 (H) 0.70 - 1.30 MG/DL    BUN/Creatinine ratio 11 (L) 12 - 20      GFR est AA 59 (L) >60 ml/min/1.73m2    GFR est non-AA 49 (L) >60 ml/min/1.73m2    Calcium 9.2 8.5 - 10.1 MG/DL    Bilirubin, total 0.3 0.2 - 1.0 MG/DL    ALT (SGPT) 18 12 - 78 U/L    AST (SGOT) 8 (L) 15 - 37 U/L    Alk.  phosphatase 65 45 - 117 U/L    Protein, total 6.8 6.4 - 8.2 g/dL    Albumin 3.7 3.5 - 5.0 g/dL    Globulin 3.1 2.0 - 4.0 g/dL    A-G Ratio 1.2 1.1 - 2.2     MAGNESIUM    Collection Time: 02/13/21  6:27 AM   Result Value Ref Range    Magnesium 1.9 1.6 - 2.4 mg/dL   PROTHROMBIN TIME + INR    Collection Time: 02/13/21  6:27 AM   Result Value Ref Range    INR 1.0 0.9 - 1.1      Prothrombin time 9.7 9.0 - 11.1 sec   TROPONIN I    Collection Time: 02/13/21  6:27 AM   Result Value Ref Range    Troponin-I, Qt. <0.05 <0.05 ng/mL   EKG, 12 LEAD, SUBSEQUENT    Collection Time: 02/13/21  6:44 AM   Result Value Ref Range    Ventricular Rate 99 BPM    Atrial Rate 99 BPM    P-R Interval 168 ms    QRS Duration 90 ms    Q-T Interval 336 ms    QTC Calculation (Bezet) 431 ms    Calculated P Axis 64 degrees    Calculated R Axis 71 degrees    Calculated T Axis 69 degrees    Diagnosis       Normal sinus rhythm  Low voltage QRS  Borderline ECG  When compared with ECG of 13-FEB-2021 06:20,  MANUAL COMPARISON REQUIRED, DATA IS UNCONFIRMED     SARS-COV-2    Collection Time: 02/13/21  7:30 AM   Result Value Ref Range    SARS-CoV-2 Please find results under separate order     COVID-19 RAPID TEST    Collection Time: 02/13/21  7:30 AM   Result Value Ref Range    Specimen source Nasopharyngeal      COVID-19 rapid test Not detected NOTD     TROPONIN I    Collection Time: 02/13/21  9:09 AM   Result Value Ref Range    Troponin-I, Qt. <0.05 <0.05 ng/mL   NT-PRO BNP    Collection Time: 02/13/21  9:09 AM   Result Value Ref Range    NT pro-BNP 65 0 - 125 PG/ML   GLUCOSE, POC    Collection Time: 02/13/21  9:28 AM Result Value Ref Range    Glucose (POC) 222 (H) 65 - 100 mg/dL    Performed by Geri Correa    EKG, 12 LEAD, INITIAL    Collection Time: 02/13/21 11:06 AM   Result Value Ref Range    Ventricular Rate 91 BPM    Atrial Rate 91 BPM    P-R Interval 144 ms    QRS Duration 90 ms    Q-T Interval 354 ms    QTC Calculation (Bezet) 435 ms    Calculated P Axis 38 degrees    Calculated R Axis 9 degrees    Calculated T Axis 51 degrees    Diagnosis       Normal sinus rhythm  Possible Inferior infarct , age undetermined  When compared with ECG of 13-FEB-2021 06:44,  MANUAL COMPARISON REQUIRED, DATA IS UNCONFIRMED

## 2021-02-13 NOTE — ED NOTES
TRANSFER - OUT REPORT:    Verbal report given to [facility] HernandezRN(name) on Jennifer Rai  being transferred to [address] [facility] Emergency Dept(unit) for routine progression of care       Report consisted of patients Situation, Background, Assessment and   Recommendations(SBAR). Information from the following report(s) SBAR, Kardex, ED Summary, Intake/Output, MAR, Recent Results and Cardiac Rhythm nsr was reviewed with the receiving nurse. Lines: 20 gauge LAC  Peripheral IV 02/13/21 Left Forearm (Active)        Opportunity for questions and clarification was provided.       Patient transported with:   Monitor  Patient's medications from home

## 2021-02-13 NOTE — Clinical Note
Lesion: Located in the Ostium LM. Stent deployed. Single technique used. First inflation pressure = 16 linda; inflation time: 18 sec.

## 2021-02-13 NOTE — ED NOTES
1100: Assumed care of patient. Patient placed in position of comfort. Call bell in reach. Skin warm and dry. Respirations even and unlabored. In no apparent distress at this time. Pt appears disgruntle and mentioned \"wanting to go home. \" Reports being a \"heavy smoker\" - asked Dr. Mickey Iglesias for a Nicotine patch. Placed on cardiac monitor x 3. Will continue to monitor. 1144: Meal tray ordered. 1245: Meal tray at bedside, pt eating. Skin warm and dry. Respirations even and unlabored. In no apparent distress at this time. 1440: Echo tech at bedside. 1518: After walking to and from the restroom, pt called out with c/o Lt sided CP - 1 SL Nitro given. 1521: CP relieved. 1524: Pt called out with c/o Lt sided CP - 1 SL Nitro given. 1529: EKG performed - read by ED physician, Dr. Geovanna Galeano    4078: Pt tearful - states pain is easing up in his chest, but is now radiating to his back. 1 SL Nitro given. 1543: Paged Dr. Darnell Gonzalez    [phone]: Spoke with Dr. Darnell Gonzalez - orders received for Nitro gtt and 4 mg Morphine IV    1646: CP now resolved. Continues with mild Lt upper back discomfort. Skin warm and dry. Respirations even and unlabored. In no apparent distress at this time. Will continue to monitor. 1726: TRANSFER - OUT REPORT:    Verbal report given to Joaquim Fitzpatrick RN (name) on [facility]  being transferred to ED 15 (unit) for routine progression of care       Report consisted of patients Situation, Background, Assessment and   Recommendations(SBAR). Information from the following report(s) SBAR was reviewed with the receiving nurse. Lines:   Peripheral IV 02/13/21 Left Forearm (Active)        Opportunity for questions and clarification was provided.

## 2021-02-13 NOTE — Clinical Note
Lesion located in the Ostium LM. Balloon inflated using multiple inflations inflation technique. Lesion #1: Pressure = 8 linda; Duration = 7 sec. Inflation 2: Pressure = 8 linda; Duration = 8 sec. Inflation 3: Pressure = 12 linda; Duration = 15 sec. Inflation 4: Pressure = 14 linda; Duration = 10 sec.

## 2021-02-14 PROBLEM — I25.110 CORONARY ARTERY DISEASE INVOLVING NATIVE CORONARY ARTERY OF NATIVE HEART WITH UNSTABLE ANGINA PECTORIS (HCC): Status: ACTIVE | Noted: 2021-02-14

## 2021-02-14 LAB
ANION GAP SERPL CALC-SCNC: 5 MMOL/L (ref 5–15)
ATRIAL RATE: 91 BPM
BASOPHILS # BLD: 0 K/UL (ref 0–0.1)
BASOPHILS NFR BLD: 0 % (ref 0–1)
BUN SERPL-MCNC: 15 MG/DL (ref 6–20)
BUN/CREAT SERPL: 13 (ref 12–20)
CALCIUM SERPL-MCNC: 8.6 MG/DL (ref 8.5–10.1)
CALCULATED P AXIS, ECG09: 38 DEGREES
CALCULATED R AXIS, ECG10: 9 DEGREES
CALCULATED T AXIS, ECG11: 51 DEGREES
CHLORIDE SERPL-SCNC: 107 MMOL/L (ref 97–108)
CHOLEST SERPL-MCNC: 86 MG/DL
CO2 SERPL-SCNC: 23 MMOL/L (ref 21–32)
CREAT SERPL-MCNC: 1.14 MG/DL (ref 0.7–1.3)
DIAGNOSIS, 93000: NORMAL
DIFFERENTIAL METHOD BLD: ABNORMAL
ECHO AO ROOT DIAM: 3.13 CM
ECHO AV AREA PEAK VELOCITY: 2.24 CM2
ECHO AV AREA/BSA PEAK VELOCITY: 1.1 CM2/M2
ECHO AV PEAK GRADIENT: 4.44 MMHG
ECHO AV PEAK VELOCITY: 105.34 CM/S
ECHO LA MAJOR AXIS: 2.81 CM
ECHO LA MINOR AXIS: 1.44 CM
ECHO LV E' LATERAL VELOCITY: 6.66 CM/S
ECHO LV E' SEPTAL VELOCITY: 6.98 CM/S
ECHO LV INTERNAL DIMENSION DIASTOLIC: 4.68 CM (ref 4.2–5.9)
ECHO LV INTERNAL DIMENSION SYSTOLIC: 3.56 CM
ECHO LV IVSD: 0.93 CM (ref 0.6–1)
ECHO LV MASS 2D: 139.6 G (ref 88–224)
ECHO LV MASS INDEX 2D: 71.6 G/M2 (ref 49–115)
ECHO LV POSTERIOR WALL DIASTOLIC: 0.85 CM (ref 0.6–1)
ECHO LVOT DIAM: 1.77 CM
ECHO LVOT PEAK GRADIENT: 3.71 MMHG
ECHO LVOT PEAK VELOCITY: 96.28 CM/S
ECHO MV A VELOCITY: 103.76 CM/S
ECHO MV E DECELERATION TIME (DT): 188.34 MS
ECHO MV E VELOCITY: 75.85 CM/S
ECHO MV E/A RATIO: 0.73
ECHO MV E/E' LATERAL: 11.39
ECHO MV E/E' RATIO (AVERAGED): 11.13
ECHO MV E/E' SEPTAL: 10.87
ECHO PV MAX VELOCITY: 88.27 CM/S
ECHO PV PEAK INSTANTANEOUS GRADIENT SYSTOLIC: 3.12 MMHG
EOSINOPHIL # BLD: 0.2 K/UL (ref 0–0.4)
EOSINOPHIL NFR BLD: 3 % (ref 0–7)
ERYTHROCYTE [DISTWIDTH] IN BLOOD BY AUTOMATED COUNT: 12.7 % (ref 11.5–14.5)
GLUCOSE BLD STRIP.AUTO-MCNC: 184 MG/DL (ref 65–100)
GLUCOSE BLD STRIP.AUTO-MCNC: 276 MG/DL (ref 65–100)
GLUCOSE BLD STRIP.AUTO-MCNC: 281 MG/DL (ref 65–100)
GLUCOSE SERPL-MCNC: 242 MG/DL (ref 65–100)
HCT VFR BLD AUTO: 37.3 % (ref 36.6–50.3)
HDLC SERPL-MCNC: 26 MG/DL
HDLC SERPL: 3.3 {RATIO} (ref 0–5)
HGB BLD-MCNC: 12 G/DL (ref 12.1–17)
IMM GRANULOCYTES # BLD AUTO: 0 K/UL (ref 0–0.04)
IMM GRANULOCYTES NFR BLD AUTO: 0 % (ref 0–0.5)
LDLC SERPL CALC-MCNC: 5.6 MG/DL (ref 0–100)
LIPID PROFILE,FLP: ABNORMAL
LYMPHOCYTES # BLD: 1.9 K/UL (ref 0.8–3.5)
LYMPHOCYTES NFR BLD: 27 % (ref 12–49)
MCH RBC QN AUTO: 28.4 PG (ref 26–34)
MCHC RBC AUTO-ENTMCNC: 32.2 G/DL (ref 30–36.5)
MCV RBC AUTO: 88.2 FL (ref 80–99)
MONOCYTES # BLD: 0.5 K/UL (ref 0–1)
MONOCYTES NFR BLD: 7 % (ref 5–13)
NEUTS SEG # BLD: 4.4 K/UL (ref 1.8–8)
NEUTS SEG NFR BLD: 63 % (ref 32–75)
NRBC # BLD: 0 K/UL (ref 0–0.01)
NRBC BLD-RTO: 0 PER 100 WBC
P-R INTERVAL, ECG05: 144 MS
PLATELET # BLD AUTO: 170 K/UL (ref 150–400)
PMV BLD AUTO: 11.4 FL (ref 8.9–12.9)
POTASSIUM SERPL-SCNC: 4.5 MMOL/L (ref 3.5–5.1)
Q-T INTERVAL, ECG07: 354 MS
QRS DURATION, ECG06: 90 MS
QTC CALCULATION (BEZET), ECG08: 435 MS
RBC # BLD AUTO: 4.23 M/UL (ref 4.1–5.7)
SERVICE CMNT-IMP: ABNORMAL
SODIUM SERPL-SCNC: 135 MMOL/L (ref 136–145)
TRIGL SERPL-MCNC: 272 MG/DL (ref ?–150)
VENTRICULAR RATE, ECG03: 91 BPM
VLDLC SERPL CALC-MCNC: 54.4 MG/DL
WBC # BLD AUTO: 7 K/UL (ref 4.1–11.1)

## 2021-02-14 PROCEDURE — 36415 COLL VENOUS BLD VENIPUNCTURE: CPT

## 2021-02-14 PROCEDURE — 82962 GLUCOSE BLOOD TEST: CPT

## 2021-02-14 PROCEDURE — 80048 BASIC METABOLIC PNL TOTAL CA: CPT

## 2021-02-14 PROCEDURE — 74011250636 HC RX REV CODE- 250/636: Performed by: INTERNAL MEDICINE

## 2021-02-14 PROCEDURE — 74011250637 HC RX REV CODE- 250/637: Performed by: EMERGENCY MEDICINE

## 2021-02-14 PROCEDURE — 99223 1ST HOSP IP/OBS HIGH 75: CPT | Performed by: INTERNAL MEDICINE

## 2021-02-14 PROCEDURE — 80061 LIPID PANEL: CPT

## 2021-02-14 PROCEDURE — 74011250637 HC RX REV CODE- 250/637: Performed by: INTERNAL MEDICINE

## 2021-02-14 PROCEDURE — 74011000250 HC RX REV CODE- 250: Performed by: INTERNAL MEDICINE

## 2021-02-14 PROCEDURE — 85025 COMPLETE CBC W/AUTO DIFF WBC: CPT

## 2021-02-14 PROCEDURE — 65660000000 HC RM CCU STEPDOWN

## 2021-02-14 PROCEDURE — 74011250636 HC RX REV CODE- 250/636: Performed by: NURSE PRACTITIONER

## 2021-02-14 PROCEDURE — 2709999900 HC NON-CHARGEABLE SUPPLY

## 2021-02-14 PROCEDURE — 93571 IV DOP VEL&/PRESS C FLO 1ST: CPT | Performed by: INTERNAL MEDICINE

## 2021-02-14 PROCEDURE — 74011636637 HC RX REV CODE- 636/637: Performed by: INTERNAL MEDICINE

## 2021-02-14 RX ORDER — SODIUM CHLORIDE 9 MG/ML
75 INJECTION, SOLUTION INTRAVENOUS CONTINUOUS
Status: DISPENSED | OUTPATIENT
Start: 2021-02-14 | End: 2021-02-16

## 2021-02-14 RX ORDER — ENOXAPARIN SODIUM 100 MG/ML
40 INJECTION SUBCUTANEOUS EVERY 24 HOURS
Status: DISCONTINUED | OUTPATIENT
Start: 2021-02-14 | End: 2021-02-15

## 2021-02-14 RX ADMIN — NITROGLYCERIN 30 MCG/MIN: 20 INJECTION INTRAVENOUS at 21:49

## 2021-02-14 RX ADMIN — CARVEDILOL 12.5 MG: 12.5 TABLET, FILM COATED ORAL at 10:30

## 2021-02-14 RX ADMIN — INSULIN LISPRO 2 UNITS: 100 INJECTION, SOLUTION INTRAVENOUS; SUBCUTANEOUS at 16:34

## 2021-02-14 RX ADMIN — INSULIN LISPRO 5 UNITS: 100 INJECTION, SOLUTION INTRAVENOUS; SUBCUTANEOUS at 12:15

## 2021-02-14 RX ADMIN — ATORVASTATIN CALCIUM 40 MG: 40 TABLET, FILM COATED ORAL at 10:30

## 2021-02-14 RX ADMIN — CARVEDILOL 12.5 MG: 12.5 TABLET, FILM COATED ORAL at 16:34

## 2021-02-14 RX ADMIN — GABAPENTIN 600 MG: 300 CAPSULE ORAL at 21:48

## 2021-02-14 RX ADMIN — GABAPENTIN 200 MG: 100 CAPSULE ORAL at 10:30

## 2021-02-14 RX ADMIN — Medication 40 ML: at 21:49

## 2021-02-14 RX ADMIN — INSULIN LISPRO 3 UNITS: 100 INJECTION, SOLUTION INTRAVENOUS; SUBCUTANEOUS at 22:03

## 2021-02-14 RX ADMIN — NITROGLYCERIN 30 MCG/MIN: 20 INJECTION INTRAVENOUS at 13:03

## 2021-02-14 RX ADMIN — SODIUM CHLORIDE 75 ML/HR: 9 INJECTION, SOLUTION INTRAVENOUS at 10:46

## 2021-02-14 RX ADMIN — ASPIRIN 81 MG: 81 TABLET, COATED ORAL at 10:30

## 2021-02-14 RX ADMIN — SODIUM CHLORIDE 75 ML/HR: 9 INJECTION, SOLUTION INTRAVENOUS at 21:49

## 2021-02-14 RX ADMIN — ENOXAPARIN SODIUM 40 MG: 40 INJECTION SUBCUTANEOUS at 16:34

## 2021-02-14 NOTE — PROGRESS NOTES
Hospitalist Progress Note    NAME: Torito Massey   :  1960   MRN:  [id number]     I reviewed pertinent labs/imaging and discussed plan of care with Dr. Toney Storm who is in agreement. Assessment / Plan:  Chest pain  CAD declined CABG in 2018  PAF  HTN  Dyslipidemia   Mildly elevated troponin  PAD s/p remote SFA angioplasty  CXR 21:  Minimal diffuse interstitial prominence may represent minimal edema  or atypical viral pneumonia. Echo 21:  · LV: Estimated LVEF is 55 - 60%. Visually measured ejection fraction. Normal cavity size, wall thickness and systolic function (ejection fraction normal). · AV: Probably trileaflet aortic valve. · RV: Not well visualized. - Continue asa 81 mg po daily. - Continue carvedilol 12.5 mg po bid. - Continue atorvastatin 40 mg po daily.  - Hold lisinopril for now. - Hold apixaban for now. - Plan for cardiac cath tomorrow. Asymptomatic hyponatremia  - No tx indicated. - Monitor. Renal insuffiencey likely NOEMY  - Renal function now WNL. - Hold ACEI at this time. - Avoid nephrotoxins.   - Monitor. Acute anemia   - Hgb down 3.4 gm since admission.  - Recheck H/H now.  - Guaiac stool.  - Monitor. COPD  Tobacco abuse  - Continue nicotine patch. - Smoking cessation reinforced. Poorly controlled DM II  Hyperglycemia   Neuropathy  HgbA1C 21:  8.5  - Continue to hold oral diabetes meds. - Continue FSBS with SSI correction scale. - Continue gabapentin 200 mg qam and 600 mg po qhs.       25.0 - 29.9 Overweight / Body mass index is 25.82 kg/m². Code status: Full  Prophylaxis: Lovenox  Recommended Disposition: Home w/Family     Subjective:     Chief Complaint / Reason for Physician Visit  No complaints. Denies chest pain, palpitations, and SOB. Plan of care and pertinent events reviewed with bedside nurse.      Review of Systems:  Symptom Y/N Comments  Symptom Y/N Comments   Fever/Chills N   Chest Pain N    Poor Appetite N   Edema N Cough N   Abdominal Pain N    Sputum N   Joint Pain N    SOB/COCHRAN N   Pruritis/Rash N    Nausea/vomit N   Tolerating PT/OT     Diarrhea N   Tolerating Diet Y    Constipation N   Other       Could NOT obtain due to:      Objective:     VITALS:   Last 24hrs VS reviewed since prior progress note. Most recent are:  Patient Vitals for the past 24 hrs:   Temp Pulse Resp BP SpO2   02/14/21 1157 98.1 °F (36.7 °C) 81 20 139/79 95 %   02/14/21 1100  87 24 (!) 147/84 94 %   02/14/21 1000  87 15 (!) 150/84 96 %   02/14/21 0900  75 18 (!) 146/84 93 %   02/14/21 0800  83 17 (!) 126/59 92 %   02/14/21 0400  84 14 (!) 141/77 96 %   02/14/21 0328 98 °F (36.7 °C) 84 18 125/72 93 %   02/14/21 0300  80 17 125/72 91 %   02/14/21 0200  81 19 118/66 93 %   02/14/21 0000  89 21 110/60 91 %   02/13/21 2300  88 20 115/66 91 %   02/13/21 2200    134/72    02/13/21 2100  93 24 134/74 94 %   02/13/21 2000  89 19 115/66 93 %   02/13/21 1936  94 18 126/87 94 %   02/13/21 1900 98.6 °F (37 °C) 97 21 (!) 144/75 95 %   02/13/21 1834  91 18 130/64 96 %   02/13/21 1749  86  133/73    02/13/21 1726  89 13  96 %   02/13/21 1700  93 17 118/73 94 %   02/13/21 1636  90 22 97/69 94 %   02/13/21 1623  90 20 113/67 96 %   02/13/21 1529  (!) 101 21 110/74 91 %   02/13/21 1522  98 21 121/68 91 %   02/13/21 1505  97  112/73 91 %   02/13/21 1400  100 19 112/73 97 %   02/13/21 1300  99 14 (!) 146/84        Intake/Output Summary (Last 24 hours) at 2/14/2021 1219  Last data filed at 2/14/2021 1100  Gross per 24 hour   Intake 106.25 ml   Output    Net 106.25 ml        I had a face to face encounter and independently examined this patient on 2/14/2021, as outlined below:  PHYSICAL EXAM:  General:  A/A/O X 3. NAD. HEENT:  Normocephalic. Sclera anicteric. EOMI. Mucous membranes moist.    Chest:  Resps even/unlabored with symmetrical CWE. Air entry diminished. Lungs CTA. No use of accessory muscles. CV:  RRR. Normal S1/S2. No M/C/R appreciated. No JVD. No peripheral edema. GI:  Abdomen soft/NT/ND. No organomegaly. No hernia. ABT X 4.    :  Voiding. Neurologic:  Nonfocal.  CN II-XII grossly intact. Face symmetrical.  Speech normal.     Psych:  Cooperative. No anxiety or agitation. No suicidal/homicidal ideation. Skin:  No rashes or jaundice. Reviewed most current lab test results and cultures  YES  Reviewed most current radiology test results   YES  Review and summation of old records today    NO  Reviewed patient's current orders and MAR    YES  PMH/SH reviewed - no change compared to H&P  ________________________________________________________________________  Care Plan discussed with:    Comments   Patient [address]     Consultant                        Multidiciplinary team rounds were held today with , nursing, pharmacist and clinical coordinator. Patient's plan of care was discussed; medications were reviewed and discharge planning was addressed. ________________________________________________________________________  Roro Chu NP     Procedures: see electronic medical records for all procedures/Xrays and details which were not copied into this note but were reviewed prior to creation of Plan. LABS:  I reviewed today's most current labs and imaging studies.   Pertinent labs include:  Recent Labs     02/14/21 0327 02/13/21 0627   WBC 7.0 10.3   HGB 12.0* 15.4   HCT 37.3 45.6    197     Recent Labs     02/14/21 0327 02/13/21 0627   * 139   K 4.5 4.4    103   CO2 23 25   * 271*   BUN 15 17   CREA 1.14 1.48*   CA 8.6 9.2   MG  --  1.9   ALB  --  3.7   TBILI  --  0.3   ALT  --  18   INR  --  1.0       Signed: Roro Chu NP

## 2021-02-14 NOTE — PROGRESS NOTES
Reason for Admission:   Chest pain                   RUR Score:          12%           Plan for utilizing home health:   None at this time       PCP: First and Last name:  Dr. Tami Gottron   Name of Practice:    Are you a current patient: Yes/No: Yes   Approximate date of last visit: 1 month ago   Can you participate in a virtual visit with your PCP:                     Current Advanced Directive/Advance Care Plan: CM offered education re: advance directives. Pt verbalized understanding and is interested in completing an advance directive. CM informed  service    Healthcare Decision Maker:   Lloyd Prescott [phone]  Spouse () Vee Lam [phone]  Son Zack Beltran [phone]  Brother Beronica Malone [phone]    Transition of Care Plan:                      Pt is a 61 y/o [place] male/female who was admitted with a diagnosis of chest pain. CM called pt via room phone re: assessment, discharge planning. CM introduced self, explained role. Pt verbalized understanding. Pt verified demographic information on chart and reports no changes need to be made at this time. Pt lives alone in a 1 story home with a ramp to enter. Pt is independent in ADL/IADL needs at baseline. Pt does not have access to DME in the home. Pt has utilized home health prior to admission but cannot recall agency used. Pt has not utilized SNF prior to admission. Pt does drive at baseline. Pt reports he will need a ride to [address], where is car is, at discharge. Pt plans on returning home at discharge. Pt reports he is currently  from his wife, and his family lives in [place]. Pt has limited social support. Pt reports no further questions or needs at this time. CM will continue to remain available for support, discharge planning as needed. [place]  Home  Follow up appointments  Pt will need a ride to [address] to get his vehicle     Care Management Interventions  PCP Verified by CM:  Yes  Last Visit to PCP: 01/14/21  Palliative Care Criteria Met (RRAT>21 & CHF Dx)?: No  Mode of Transport at Discharge:  Other (see comment)  Transition of Care Consult (CM Consult): Discharge Planning  MyChart Signup: No  Discharge Durable Medical Equipment: No  Physical Therapy Consult: No  Occupational Therapy Consult: No  Speech Therapy Consult: No  Current Support Network: Own Home, Lives Alone  Confirm Follow Up Transport: Self  Discharge Location  Discharge Placement: (TBD)    Vikash Schuster, ESTEBAN, LSW  Supervisee in [address] [place] [address]  [phone]

## 2021-02-14 NOTE — CONSULTS
[address] [facility]     Date of  Admission: 2/13/2021 10:46 AM     Admission type:Emergency    Consult for:  Consult by: Subjective:     Heladio Kidd is a 62 y.o. male admitted for Chest pain [R07.9]. Prolonged SS and L precordial pain at rest.  Trop negative. Hx of cath with CAD in 2018 for which CABG rec but declined. Details unknown. Has PAF as well. Heavy smoker and strong FH. Resolute against having CABG.       Patient Active Problem List    Diagnosis Date Noted    Coronary artery disease involving native coronary artery of native heart with unstable angina pectoris ([address] 02/14/2021    Chest pain 02/13/2021    Type 2 diabetes mellitus with diabetic neuropathy ([address] 12/07/2020    Type 2 diabetes mellitus with peripheral vascular disease ([address] 12/07/2020    Chronic obstructive pulmonary disease ([address]     Bilateral hearing loss, unspecified hearing loss type 08/27/2020    Easy bruising 08/27/2020    Peripheral arterial disease ([address] 08/27/2020    Controlled type 2 diabetes mellitus without complication, without long-term current use of insulin ([address] 01/10/2020    Paroxysmal atrial fibrillation ([address] 01/10/2020    Essential hypertension 01/10/2020    Erectile dysfunction, unspecified erectile dysfunction type 01/10/2020    Hyperlipidemia, unspecified hyperlipidemia type 01/10/2020    Skin ulcer of left foot, limited to breakdown of skin ([address] 01/10/2020    Tobacco use 08/07/2416      Kin Jackson MD  Past Medical History:   Diagnosis Date    CAD (coronary artery disease)     Atrial Fibrillation     Chronic obstructive pulmonary disease ([address]     Diabetes ([address]     Hypertension       Social History     Socioeconomic History    Marital status:      Spouse name: Not on file    Number of children: Not on file    Years of education: Not on file    Highest education level: Not on file   Tobacco Use    Smoking status: Current Every Day Smoker     Packs/day: 1.50    Smokeless tobacco: Never Used    Tobacco comment: 1.5 pack    Substance and Sexual Activity    Alcohol use: Not Currently     Comment: quit 38 years     Drug use: Not Currently    Sexual activity: Yes     Partners: Female   Other Topics Concern   Social History Narrative    ** Merged History Encounter **          Allergies   Allergen Reactions    Penicillin G Unknown (comments)     When he was a young child Mother told him he was allergic      Family History   Problem Relation Age of Onset    Heart Disease Mother         triple bypass     Cancer Sister     Heart Disease Sister     Diabetes Brother     Cancer Sister       Current Facility-Administered Medications   Medication Dose Route Frequency    nicotine (NICODERM CQ) 21 mg/24 hr patch 1 Patch  1 Patch TransDERmal DAILY    sodium chloride (NS) flush 5-40 mL  5-40 mL IntraVENous Q8H    sodium chloride (NS) flush 5-40 mL  5-40 mL IntraVENous PRN    acetaminophen (TYLENOL) tablet 650 mg  650 mg Oral Q6H PRN    Or    acetaminophen (TYLENOL) suppository 650 mg  650 mg Rectal Q6H PRN    polyethylene glycol (MIRALAX) packet 17 g  17 g Oral DAILY PRN    promethazine (PHENERGAN) tablet 12.5 mg  12.5 mg Oral Q6H PRN    Or    ondansetron (ZOFRAN) injection 4 mg  4 mg IntraVENous Q6H PRN    insulin lispro (HUMALOG) injection   SubCUTAneous AC&HS    glucose chewable tablet 16 g  4 Tab Oral PRN    dextrose (D50W) injection syrg 12.5-25 g  12.5-25 g IntraVENous PRN    glucagon (GLUCAGEN) injection 1 mg  1 mg IntraMUSCular PRN    aspirin delayed-release tablet 81 mg  81 mg Oral DAILY    atorvastatin (LIPITOR) tablet 40 mg  40 mg Oral DAILY    gabapentin (NEURONTIN) capsule 600 mg  600 mg Oral QHS    nitroglycerin (NITROSTAT) tablet 0.4 mg  0.4 mg SubLINGual PRN    carvediloL (COREG) tablet 12.5 mg  12.5 mg Oral BID WITH MEALS    gabapentin (NEURONTIN) capsule 200 mg  200 mg Oral QAM    albuterol-ipratropium (DUO-NEB) 2.5 MG-0.5 MG/3 ML  3 mL Nebulization Q4H PRN    enoxaparin (LOVENOX) injection 80 mg  1 mg/kg SubCUTAneous Q12H    nitroglycerin (Tridil) 200 mcg/ml infusion  0-100 mcg/min IntraVENous TITRATE    morphine injection 2 mg  2 mg IntraVENous Q4H PRN    melatonin tablet 6 mg  6 mg Oral QHS PRN     Current Outpatient Medications   Medication Sig    terbinafine HCL (LAMISIL) 250 mg tablet Take 250 mg by mouth daily.  glipiZIDE SR (GLUCOTROL XL) 10 mg CR tablet Take 1 Tab by mouth two (2) times a day. Indications: type 2 diabetes mellitus    gabapentin (NEURONTIN) 100 mg capsule Take 2 Caps by mouth Every morning. Max Daily Amount: 200 mg.    gabapentin (NEURONTIN) 300 mg capsule Take 2 Caps by mouth nightly. Max Daily Amount: 600 mg.  aspirin delayed-release 81 mg tablet Take 1 Tab by mouth daily.  atorvastatin (LIPITOR) 40 mg tablet Take 1 Tab by mouth daily. Indications: excessive fat in the blood    carvediloL (COREG) 12.5 mg tablet Take 1 Tab by mouth two (2) times daily (with meals). Indications: atrial fibrillation    lisinopriL (PRINIVIL, ZESTRIL) 10 mg tablet Take 1 Tab by mouth daily. Indications: high blood pressure    metFORMIN (GLUCOPHAGE) 1,000 mg tablet Take 1 Tab by mouth two (2) times daily (with meals). Indications: type 2 diabetes mellitus    apixaban (Eliquis) 5 mg tablet Take 1 Tab by mouth two (2) times a day. Indications: treatment to prevent blood clots in chronic atrial fibrillation    multivitamin (ONE A DAY) tablet Take 1 Tab by mouth daily.         Review of Symptoms:   11 systems reviewed, negative other than as stated in the HPI        Objective:      Visit Vitals  BP (!) 150/84   Pulse 87   Temp 98 °F (36.7 °C)   Resp 15   Ht 5' 9\" (1.753 m)   Wt 174 lb 13.2 oz (79.3 kg)   SpO2 96%   BMI 25.82 kg/m²       Physical:   General:   Heart: RRR, no m/S3/JVD, no carotid bruits   Lungs: clear   Abdomen: Soft, +BS, NTND   Extremities: LE romy +DP/PT, no edema   Neurologic: Grossly normal    Data Review:   Recent Labs     02/14/21  0327 02/13/21  0627   WBC 7.0 10.3   HGB 12.0* 15.4   HCT 37.3 45.6    197     Recent Labs     02/14/21  0327 02/13/21  0627   * 139   K 4.5 4.4    103   CO2 23 25   * 271*   BUN 15 17   CREA 1.14 1.48*   CA 8.6 9.2   MG  --  1.9   ALB  --  3.7   TBILI  --  0.3   ALT  --  18   INR  --  1.0       Recent Labs     02/13/21  1805 02/13/21  0909 02/13/21  0627   TROIQ <0.05 <0.05 <0.05       No intake or output data in the 24 hours ending 02/14/21 1024     Cardiographics    Telemetry:   ECG: nonischemic  Echocardiogram:   CXRAY:       Assessment:       Active Problems:    Paroxysmal atrial fibrillation ([facility] (1/10/2020)      Hyperlipidemia, unspecified hyperlipidemia type (1/10/2020)      Tobacco use (1/10/2020)      Chronic obstructive pulmonary disease (HCC) ()      Type 2 diabetes mellitus with diabetic neuropathy ([facility] (12/7/2020)      Chest pain (2/13/2021)      Coronary artery disease involving native coronary artery of native heart with unstable angina pectoris ([facility] (2/14/2021)         Plan:     Cath lazaro. Pt understands that MV disease with type 2 DM gets better long term outcomes with CABG, but he still wants PCI only.   He understands that it may require more than one procedure depending on complexity and presence of Johnny Gonsalez MD

## 2021-02-14 NOTE — PROGRESS NOTES
Received message from patient's nurse Reece Rodríguez stating :      patient is requesting something to help him sleep; he also wants 2mg morphine ordered prn instead of the 1 mg morphine that's ordered now. Discussion / orders:    Increased morphine to 2 mg  Ordered melatonin 6 mg qhs prn         Please note that this note was dictated using Dragon computer voice recognition software. Quite often unanticipated grammatical, syntax, homophones, and other interpretive errors are inadvertently transcribed by the computer software. Please disregard these errors. Please excuse any errors that have escaped final proofreading.

## 2021-02-15 PROBLEM — Z98.890 S/P CARDIAC CATH: Status: ACTIVE | Noted: 2021-02-15

## 2021-02-15 LAB
ANION GAP SERPL CALC-SCNC: 6 MMOL/L (ref 5–15)
ATRIAL RATE: 96 BPM
ATRIAL RATE: 99 BPM
B PERT DNA SPEC QL NAA+PROBE: NOT DETECTED
BASOPHILS # BLD: 0 K/UL (ref 0–0.1)
BASOPHILS NFR BLD: 0 % (ref 0–1)
BORDETELLA PARAPERTUSSIS PCR, BORPAR: NOT DETECTED
BUN SERPL-MCNC: 16 MG/DL (ref 6–20)
BUN/CREAT SERPL: 14 (ref 12–20)
C PNEUM DNA SPEC QL NAA+PROBE: NOT DETECTED
CALCIUM SERPL-MCNC: 8 MG/DL (ref 8.5–10.1)
CALCULATED P AXIS, ECG09: 64 DEGREES
CALCULATED P AXIS, ECG09: 79 DEGREES
CALCULATED R AXIS, ECG10: 71 DEGREES
CALCULATED R AXIS, ECG10: 77 DEGREES
CALCULATED T AXIS, ECG11: 69 DEGREES
CALCULATED T AXIS, ECG11: 75 DEGREES
CHLORIDE SERPL-SCNC: 110 MMOL/L (ref 97–108)
CO2 SERPL-SCNC: 23 MMOL/L (ref 21–32)
CREAT SERPL-MCNC: 1.13 MG/DL (ref 0.7–1.3)
DIAGNOSIS, 93000: NORMAL
DIAGNOSIS, 93000: NORMAL
DIFFERENTIAL METHOD BLD: NORMAL
EOSINOPHIL # BLD: 0.2 K/UL (ref 0–0.4)
EOSINOPHIL NFR BLD: 2 % (ref 0–7)
ERYTHROCYTE [DISTWIDTH] IN BLOOD BY AUTOMATED COUNT: 12.4 % (ref 11.5–14.5)
FLUAV H1 2009 PAND RNA SPEC QL NAA+PROBE: NOT DETECTED
FLUAV H1 RNA SPEC QL NAA+PROBE: NOT DETECTED
FLUAV H3 RNA SPEC QL NAA+PROBE: NOT DETECTED
FLUAV SUBTYP SPEC NAA+PROBE: NOT DETECTED
FLUBV RNA SPEC QL NAA+PROBE: NOT DETECTED
GLUCOSE BLD STRIP.AUTO-MCNC: 265 MG/DL (ref 65–100)
GLUCOSE BLD STRIP.AUTO-MCNC: 285 MG/DL (ref 65–100)
GLUCOSE BLD STRIP.AUTO-MCNC: 297 MG/DL (ref 65–100)
GLUCOSE SERPL-MCNC: 265 MG/DL (ref 65–100)
HADV DNA SPEC QL NAA+PROBE: NOT DETECTED
HCOV 229E RNA SPEC QL NAA+PROBE: NOT DETECTED
HCOV HKU1 RNA SPEC QL NAA+PROBE: NOT DETECTED
HCOV NL63 RNA SPEC QL NAA+PROBE: NOT DETECTED
HCOV OC43 RNA SPEC QL NAA+PROBE: NOT DETECTED
HCT VFR BLD AUTO: 37.3 % (ref 36.6–50.3)
HGB BLD-MCNC: 12.5 G/DL (ref 12.1–17)
HMPV RNA SPEC QL NAA+PROBE: NOT DETECTED
HPIV1 RNA SPEC QL NAA+PROBE: NOT DETECTED
HPIV2 RNA SPEC QL NAA+PROBE: NOT DETECTED
HPIV3 RNA SPEC QL NAA+PROBE: NOT DETECTED
HPIV4 RNA SPEC QL NAA+PROBE: NOT DETECTED
IMM GRANULOCYTES # BLD AUTO: 0 K/UL (ref 0–0.04)
IMM GRANULOCYTES NFR BLD AUTO: 0 % (ref 0–0.5)
LYMPHOCYTES # BLD: 1.4 K/UL (ref 0.8–3.5)
LYMPHOCYTES NFR BLD: 17 % (ref 12–49)
M PNEUMO DNA SPEC QL NAA+PROBE: NOT DETECTED
MAGNESIUM SERPL-MCNC: 1.9 MG/DL (ref 1.6–2.4)
MCH RBC QN AUTO: 28.9 PG (ref 26–34)
MCHC RBC AUTO-ENTMCNC: 33.5 G/DL (ref 30–36.5)
MCV RBC AUTO: 86.1 FL (ref 80–99)
MONOCYTES # BLD: 0.5 K/UL (ref 0–1)
MONOCYTES NFR BLD: 6 % (ref 5–13)
NEUTS SEG # BLD: 6 K/UL (ref 1.8–8)
NEUTS SEG NFR BLD: 75 % (ref 32–75)
NRBC # BLD: 0 K/UL (ref 0–0.01)
NRBC BLD-RTO: 0 PER 100 WBC
P-R INTERVAL, ECG05: 152 MS
P-R INTERVAL, ECG05: 168 MS
PHOSPHATE SERPL-MCNC: 2.8 MG/DL (ref 2.6–4.7)
PLATELET # BLD AUTO: 167 K/UL (ref 150–400)
PMV BLD AUTO: 11.6 FL (ref 8.9–12.9)
POTASSIUM SERPL-SCNC: 3.9 MMOL/L (ref 3.5–5.1)
Q-T INTERVAL, ECG07: 336 MS
Q-T INTERVAL, ECG07: 338 MS
QRS DURATION, ECG06: 86 MS
QRS DURATION, ECG06: 90 MS
QTC CALCULATION (BEZET), ECG08: 427 MS
QTC CALCULATION (BEZET), ECG08: 431 MS
RBC # BLD AUTO: 4.33 M/UL (ref 4.1–5.7)
RSV RNA SPEC QL NAA+PROBE: NOT DETECTED
RV+EV RNA SPEC QL NAA+PROBE: NOT DETECTED
SARS-COV-2 PCR, COVPCR: NOT DETECTED
SERVICE CMNT-IMP: ABNORMAL
SODIUM SERPL-SCNC: 139 MMOL/L (ref 136–145)
VENTRICULAR RATE, ECG03: 96 BPM
VENTRICULAR RATE, ECG03: 99 BPM
WBC # BLD AUTO: 8.2 K/UL (ref 4.1–11.1)

## 2021-02-15 PROCEDURE — 85025 COMPLETE CBC W/AUTO DIFF WBC: CPT

## 2021-02-15 PROCEDURE — 77030004549 HC CATH ANGI DX PRF MRTM -A: Performed by: INTERNAL MEDICINE

## 2021-02-15 PROCEDURE — C1769 GUIDE WIRE: HCPCS | Performed by: INTERNAL MEDICINE

## 2021-02-15 PROCEDURE — 36415 COLL VENOUS BLD VENIPUNCTURE: CPT

## 2021-02-15 PROCEDURE — 99233 SBSQ HOSP IP/OBS HIGH 50: CPT | Performed by: CLINICAL NURSE SPECIALIST

## 2021-02-15 PROCEDURE — 74011636637 HC RX REV CODE- 636/637: Performed by: NURSE PRACTITIONER

## 2021-02-15 PROCEDURE — 74011250637 HC RX REV CODE- 250/637: Performed by: INTERNAL MEDICINE

## 2021-02-15 PROCEDURE — 92928 PRQ TCAT PLMT NTRAC ST 1 LES: CPT | Performed by: INTERNAL MEDICINE

## 2021-02-15 PROCEDURE — 93454 CORONARY ARTERY ANGIO S&I: CPT | Performed by: INTERNAL MEDICINE

## 2021-02-15 PROCEDURE — C1894 INTRO/SHEATH, NON-LASER: HCPCS | Performed by: INTERNAL MEDICINE

## 2021-02-15 PROCEDURE — 84100 ASSAY OF PHOSPHORUS: CPT

## 2021-02-15 PROCEDURE — 65660000000 HC RM CCU STEPDOWN

## 2021-02-15 PROCEDURE — 92978 ENDOLUMINL IVUS OCT C 1ST: CPT | Performed by: INTERNAL MEDICINE

## 2021-02-15 PROCEDURE — 76937 US GUIDE VASCULAR ACCESS: CPT | Performed by: INTERNAL MEDICINE

## 2021-02-15 PROCEDURE — 74011000636 HC RX REV CODE- 636: Performed by: INTERNAL MEDICINE

## 2021-02-15 PROCEDURE — 77030010221 HC SPLNT WR POS TELE -B: Performed by: INTERNAL MEDICINE

## 2021-02-15 PROCEDURE — 99152 MOD SED SAME PHYS/QHP 5/>YRS: CPT | Performed by: INTERNAL MEDICINE

## 2021-02-15 PROCEDURE — 77030015766: Performed by: INTERNAL MEDICINE

## 2021-02-15 PROCEDURE — 80048 BASIC METABOLIC PNL TOTAL CA: CPT

## 2021-02-15 PROCEDURE — 4A023N7 MEASUREMENT OF CARDIAC SAMPLING AND PRESSURE, LEFT HEART, PERCUTANEOUS APPROACH: ICD-10-PCS | Performed by: INTERNAL MEDICINE

## 2021-02-15 PROCEDURE — 027034Z DILATION OF CORONARY ARTERY, ONE ARTERY WITH DRUG-ELUTING INTRALUMINAL DEVICE, PERCUTANEOUS APPROACH: ICD-10-PCS | Performed by: INTERNAL MEDICINE

## 2021-02-15 PROCEDURE — 87426 SARSCOV CORONAVIRUS AG IA: CPT

## 2021-02-15 PROCEDURE — 74011000250 HC RX REV CODE- 250: Performed by: INTERNAL MEDICINE

## 2021-02-15 PROCEDURE — C1725 CATH, TRANSLUMIN NON-LASER: HCPCS | Performed by: INTERNAL MEDICINE

## 2021-02-15 PROCEDURE — 93571 IV DOP VEL&/PRESS C FLO 1ST: CPT | Performed by: INTERNAL MEDICINE

## 2021-02-15 PROCEDURE — C1753 CATH, INTRAVAS ULTRASOUND: HCPCS | Performed by: INTERNAL MEDICINE

## 2021-02-15 PROCEDURE — 99232 SBSQ HOSP IP/OBS MODERATE 35: CPT | Performed by: INTERNAL MEDICINE

## 2021-02-15 PROCEDURE — C1887 CATHETER, GUIDING: HCPCS | Performed by: INTERNAL MEDICINE

## 2021-02-15 PROCEDURE — 74011250637 HC RX REV CODE- 250/637: Performed by: EMERGENCY MEDICINE

## 2021-02-15 PROCEDURE — B2111ZZ FLUOROSCOPY OF MULTIPLE CORONARY ARTERIES USING LOW OSMOLAR CONTRAST: ICD-10-PCS | Performed by: INTERNAL MEDICINE

## 2021-02-15 PROCEDURE — 83735 ASSAY OF MAGNESIUM: CPT

## 2021-02-15 PROCEDURE — C1874 STENT, COATED/COV W/DEL SYS: HCPCS | Performed by: INTERNAL MEDICINE

## 2021-02-15 PROCEDURE — 99153 MOD SED SAME PHYS/QHP EA: CPT | Performed by: INTERNAL MEDICINE

## 2021-02-15 PROCEDURE — 74011000258 HC RX REV CODE- 258: Performed by: INTERNAL MEDICINE

## 2021-02-15 PROCEDURE — 82962 GLUCOSE BLOOD TEST: CPT

## 2021-02-15 PROCEDURE — 77030008543 HC TBNG MON PRSS MRTM -A: Performed by: INTERNAL MEDICINE

## 2021-02-15 PROCEDURE — 74011636637 HC RX REV CODE- 636/637: Performed by: INTERNAL MEDICINE

## 2021-02-15 PROCEDURE — 77030019569 HC BND COMPR RAD TERU -B: Performed by: INTERNAL MEDICINE

## 2021-02-15 PROCEDURE — 74011250636 HC RX REV CODE- 250/636: Performed by: INTERNAL MEDICINE

## 2021-02-15 PROCEDURE — 77030019698 HC SYR ANGI MDLON MRTM -A: Performed by: INTERNAL MEDICINE

## 2021-02-15 DEVICE — XIENCE SIERRA™ EVEROLIMUS ELUTING CORONARY STENT SYSTEM 4.00 MM X 15 MM / RAPID-EXCHANGE
Type: IMPLANTABLE DEVICE | Status: FUNCTIONAL
Brand: XIENCE SIERRA™

## 2021-02-15 RX ORDER — HEPARIN SODIUM 200 [USP'U]/100ML
INJECTION, SOLUTION INTRAVENOUS
Status: COMPLETED | OUTPATIENT
Start: 2021-02-15 | End: 2021-02-15

## 2021-02-15 RX ORDER — MIDAZOLAM HYDROCHLORIDE 1 MG/ML
INJECTION, SOLUTION INTRAMUSCULAR; INTRAVENOUS AS NEEDED
Status: DISCONTINUED | OUTPATIENT
Start: 2021-02-15 | End: 2021-02-15 | Stop reason: HOSPADM

## 2021-02-15 RX ORDER — CLOPIDOGREL BISULFATE 75 MG/1
75 TABLET ORAL DAILY
Status: DISCONTINUED | OUTPATIENT
Start: 2021-02-16 | End: 2021-02-16 | Stop reason: HOSPADM

## 2021-02-15 RX ORDER — NALOXONE HYDROCHLORIDE 0.4 MG/ML
0.4 INJECTION, SOLUTION INTRAMUSCULAR; INTRAVENOUS; SUBCUTANEOUS AS NEEDED
Status: DISCONTINUED | OUTPATIENT
Start: 2021-02-15 | End: 2021-02-16 | Stop reason: HOSPADM

## 2021-02-15 RX ORDER — INSULIN GLARGINE 100 [IU]/ML
4 INJECTION, SOLUTION SUBCUTANEOUS DAILY
Status: DISCONTINUED | OUTPATIENT
Start: 2021-02-15 | End: 2021-02-16 | Stop reason: HOSPADM

## 2021-02-15 RX ORDER — LIDOCAINE HYDROCHLORIDE 10 MG/ML
INJECTION, SOLUTION EPIDURAL; INFILTRATION; INTRACAUDAL; PERINEURAL AS NEEDED
Status: DISCONTINUED | OUTPATIENT
Start: 2021-02-15 | End: 2021-02-15 | Stop reason: HOSPADM

## 2021-02-15 RX ORDER — VERAPAMIL HYDROCHLORIDE 2.5 MG/ML
INJECTION, SOLUTION INTRAVENOUS AS NEEDED
Status: DISCONTINUED | OUTPATIENT
Start: 2021-02-15 | End: 2021-02-15 | Stop reason: HOSPADM

## 2021-02-15 RX ORDER — SODIUM CHLORIDE 0.9 % (FLUSH) 0.9 %
5-40 SYRINGE (ML) INJECTION EVERY 8 HOURS
Status: DISCONTINUED | OUTPATIENT
Start: 2021-02-15 | End: 2021-02-16 | Stop reason: HOSPADM

## 2021-02-15 RX ORDER — SODIUM CHLORIDE 0.9 % (FLUSH) 0.9 %
5-40 SYRINGE (ML) INJECTION AS NEEDED
Status: DISCONTINUED | OUTPATIENT
Start: 2021-02-15 | End: 2021-02-16 | Stop reason: HOSPADM

## 2021-02-15 RX ORDER — HEPARIN SODIUM 1000 [USP'U]/ML
INJECTION, SOLUTION INTRAVENOUS; SUBCUTANEOUS AS NEEDED
Status: DISCONTINUED | OUTPATIENT
Start: 2021-02-15 | End: 2021-02-15 | Stop reason: HOSPADM

## 2021-02-15 RX ORDER — FENTANYL CITRATE 50 UG/ML
INJECTION, SOLUTION INTRAMUSCULAR; INTRAVENOUS AS NEEDED
Status: DISCONTINUED | OUTPATIENT
Start: 2021-02-15 | End: 2021-02-15 | Stop reason: HOSPADM

## 2021-02-15 RX ORDER — CLOPIDOGREL BISULFATE 75 MG/1
TABLET ORAL AS NEEDED
Status: DISCONTINUED | OUTPATIENT
Start: 2021-02-15 | End: 2021-02-15 | Stop reason: HOSPADM

## 2021-02-15 RX ADMIN — CARVEDILOL 12.5 MG: 12.5 TABLET, FILM COATED ORAL at 17:29

## 2021-02-15 RX ADMIN — CARVEDILOL 12.5 MG: 12.5 TABLET, FILM COATED ORAL at 08:48

## 2021-02-15 RX ADMIN — GABAPENTIN 600 MG: 300 CAPSULE ORAL at 20:09

## 2021-02-15 RX ADMIN — ATORVASTATIN CALCIUM 40 MG: 40 TABLET, FILM COATED ORAL at 20:09

## 2021-02-15 RX ADMIN — GABAPENTIN 200 MG: 100 CAPSULE ORAL at 08:48

## 2021-02-15 RX ADMIN — Medication 10 ML: at 20:10

## 2021-02-15 RX ADMIN — INSULIN GLARGINE 4 UNITS: 100 INJECTION, SOLUTION SUBCUTANEOUS at 08:48

## 2021-02-15 RX ADMIN — ASPIRIN 81 MG: 81 TABLET, COATED ORAL at 08:48

## 2021-02-15 RX ADMIN — Medication 10 ML: at 17:29

## 2021-02-15 RX ADMIN — INSULIN LISPRO 2.5 UNITS: 100 INJECTION, SOLUTION INTRAVENOUS; SUBCUTANEOUS at 08:49

## 2021-02-15 RX ADMIN — INSULIN LISPRO 5 UNITS: 100 INJECTION, SOLUTION INTRAVENOUS; SUBCUTANEOUS at 17:29

## 2021-02-15 RX ADMIN — INSULIN LISPRO 3 UNITS: 100 INJECTION, SOLUTION INTRAVENOUS; SUBCUTANEOUS at 20:09

## 2021-02-15 NOTE — PROGRESS NOTES
[facility]      [address]  [phone]      Cardiology Progress Note      2/15/2021 1:40 PM    Admit Date: 2/13/2021    Admit Diagnosis:   Chest pain [R07.9]    Subjective:     Cristiano Olivera     No CP    Visit Vitals  BP (!) 148/87 (BP 1 Location: Left upper arm, BP Patient Position: At rest)   Pulse 76   Temp 97.8 °F (36.6 °C)   Resp 16   Ht 5' 9\" (1.753 m)   Wt 174 lb 13.2 oz (79.3 kg)   SpO2 98%   BMI 25.82 kg/m²       Current Facility-Administered Medications   Medication Dose Route Frequency    insulin glargine (LANTUS) injection 4 Units  4 Units SubCUTAneous DAILY    0.9% sodium chloride infusion  75 mL/hr IntraVENous CONTINUOUS    enoxaparin (LOVENOX) injection 40 mg  40 mg SubCUTAneous Q24H    nicotine (NICODERM CQ) 21 mg/24 hr patch 1 Patch  1 Patch TransDERmal DAILY    sodium chloride (NS) flush 5-40 mL  5-40 mL IntraVENous Q8H    sodium chloride (NS) flush 5-40 mL  5-40 mL IntraVENous PRN    acetaminophen (TYLENOL) tablet 650 mg  650 mg Oral Q6H PRN    Or    acetaminophen (TYLENOL) suppository 650 mg  650 mg Rectal Q6H PRN    polyethylene glycol (MIRALAX) packet 17 g  17 g Oral DAILY PRN    promethazine (PHENERGAN) tablet 12.5 mg  12.5 mg Oral Q6H PRN    Or    ondansetron (ZOFRAN) injection 4 mg  4 mg IntraVENous Q6H PRN    insulin lispro (HUMALOG) injection   SubCUTAneous AC&HS    glucose chewable tablet 16 g  4 Tab Oral PRN    dextrose (D50W) injection syrg 12.5-25 g  12.5-25 g IntraVENous PRN    glucagon (GLUCAGEN) injection 1 mg  1 mg IntraMUSCular PRN    aspirin delayed-release tablet 81 mg  81 mg Oral DAILY    atorvastatin (LIPITOR) tablet 40 mg  40 mg Oral DAILY    gabapentin (NEURONTIN) capsule 600 mg  600 mg Oral QHS    nitroglycerin (NITROSTAT) tablet 0.4 mg  0.4 mg SubLINGual PRN    carvediloL (COREG) tablet 12.5 mg  12.5 mg Oral BID WITH MEALS    gabapentin (NEURONTIN) capsule 200 mg  200 mg Oral QAM    albuterol-ipratropium (DUO-NEB) 2.5 MG-0.5 MG/3 ML  3 mL Nebulization Q4H PRN    nitroglycerin (Tridil) 200 mcg/ml infusion  0-100 mcg/min IntraVENous TITRATE    morphine injection 2 mg  2 mg IntraVENous Q4H PRN    melatonin tablet 6 mg  6 mg Oral QHS PRN       Objective:      Physical Exam:  General Appearance:    Chest:   Clear  Cardiovascular: RRR  Extremities: no edema  Skin:  Warm and dry.     Data Review:   Recent Labs     02/15/21  0353 02/14/21  0327 02/13/21  0627   WBC 8.2 7.0 10.3   HGB 12.5 12.0* 15.4   HCT 37.3 37.3 45.6    170 197     Recent Labs     02/15/21  0353 02/14/21  0327 02/13/21  0627    135* 139   K 3.9 4.5 4.4   * 107 103   CO2 23 23 25   * 242* 271*   BUN 16 15 17   CREA 1.13 1.14 1.48*   CA 8.0* 8.6 9.2   MG 1.9  --  1.9   PHOS 2.8  --   --    ALB  --   --  3.7   TBILI  --   --  0.3   ALT  --   --  18   INR  --   --  1.0       Recent Labs     02/13/21  1805 02/13/21  0909 02/13/21 0627   TROIQ <0.05 <0.05 <0.05         Intake/Output Summary (Last 24 hours) at 2/15/2021 1340  Last data filed at 2/15/2021 0351  Gross per 24 hour   Intake 2825.55 ml   Output 2000 ml   Net 825.55 ml        Telemetry:   EKG:  Cxray:    Assessment:     Active Problems:    Paroxysmal atrial fibrillation ([facility] (1/10/2020)      Hyperlipidemia, unspecified hyperlipidemia type (1/10/2020)      Tobacco use (1/10/2020)      Chronic obstructive pulmonary disease (HCC) ()      Type 2 diabetes mellitus with diabetic neuropathy ([facility] (12/7/2020)      Chest pain (2/13/2021)      Coronary artery disease involving native coronary artery of native heart with unstable angina pectoris ([facility] (2/14/2021)        Plan:     Successful PCI today on ostial LMT.   Home in AM.  Triple therapy for one month, then Jen Gomez M.D., [facility]

## 2021-02-15 NOTE — CARDIO/PULMONARY
Cardiac Rehab Note: chart review/referral  
 
Pt is a 61 yo admitted with chest pain, s/p PCI/BOBBY 2/15/21. Smoking history assessed. Patient is a current smoker. Smoking Cessation Program link has been added to the AVS. EF 60%  on 2/15/21 per echo. CAD education folder, with heart heathy diet, warning signs, heart facts, catheterization brochure, and out patient cardiac rehab program provided to Felipa Schlatter on 2/15/21. Educated using teach back method. Reviewed CAD diagnosis definition and purpose of intervention. Discussed risk factors for CAD to include the following: family history, elevated BMI, hyperlipidemia, hypertension, diabetes, and stress. Discussed Heart Healthy/Low Sodium (less than 2000 mg) diet. Pt relayed long story of diagnosis of diabetes and changes to his diet. He is somewhat unwilling to make additional changes at this time. Diabetes Clinical Nurse Specialist also in to see pt. Reviewed the importance of medication compliance, follow up appointments with cardiologist, signs and symptoms of angina, and what to report to physician after discharge. Emphasized the value of cardiac rehab. Discussed Cardiac Rehab Program format, benefits, and encouraged enrollment to assist with risk modification and management. 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 He will not be able to enroll in CR due to work schedule conflict. Felipa Schlatter verbalized understanding with questions answered.   Osiel Medrano RN

## 2021-02-15 NOTE — PROGRESS NOTES
[address]  [phone]      Cardiology Progress Note      2/15/2021 1:40 PM    Admit Date: 2/13/2021    Admit Diagnosis:   Chest pain [R07.9]    Subjective:     Christina Chi is s/p BOBBY/PCI to Ostial LM. No cardiac complaints. VSS. Labs stable, Cr at baseline 1.13 will continue LOUIS hydration. Patient to stay overnight. Post-procedure EKG performed.        Visit Vitals  BP (!) 148/87 (BP 1 Location: Left upper arm, BP Patient Position: At rest)   Pulse 76   Temp 97.8 °F (36.6 °C)   Resp 16   Ht 5' 9\" (1.753 m)   Wt 79.3 kg (174 lb 13.2 oz)   SpO2 98%   BMI 25.82 kg/m²       Current Facility-Administered Medications   Medication Dose Route Frequency    insulin glargine (LANTUS) injection 4 Units  4 Units SubCUTAneous DAILY    0.9% sodium chloride infusion  75 mL/hr IntraVENous CONTINUOUS    enoxaparin (LOVENOX) injection 40 mg  40 mg SubCUTAneous Q24H    nicotine (NICODERM CQ) 21 mg/24 hr patch 1 Patch  1 Patch TransDERmal DAILY    sodium chloride (NS) flush 5-40 mL  5-40 mL IntraVENous Q8H    sodium chloride (NS) flush 5-40 mL  5-40 mL IntraVENous PRN    acetaminophen (TYLENOL) tablet 650 mg  650 mg Oral Q6H PRN    Or    acetaminophen (TYLENOL) suppository 650 mg  650 mg Rectal Q6H PRN    polyethylene glycol (MIRALAX) packet 17 g  17 g Oral DAILY PRN    promethazine (PHENERGAN) tablet 12.5 mg  12.5 mg Oral Q6H PRN    Or    ondansetron (ZOFRAN) injection 4 mg  4 mg IntraVENous Q6H PRN    insulin lispro (HUMALOG) injection   SubCUTAneous AC&HS    glucose chewable tablet 16 g  4 Tab Oral PRN    dextrose (D50W) injection syrg 12.5-25 g  12.5-25 g IntraVENous PRN    glucagon (GLUCAGEN) injection 1 mg  1 mg IntraMUSCular PRN    aspirin delayed-release tablet 81 mg  81 mg Oral DAILY    atorvastatin (LIPITOR) tablet 40 mg  40 mg Oral DAILY    gabapentin (NEURONTIN) capsule 600 mg  600 mg Oral QHS    nitroglycerin (NITROSTAT) tablet 0.4 mg  0.4 mg SubLINGual PRN    carvediloL (COREG) tablet 12.5 mg  12.5 mg Oral BID WITH MEALS    gabapentin (NEURONTIN) capsule 200 mg  200 mg Oral QAM    albuterol-ipratropium (DUO-NEB) 2.5 MG-0.5 MG/3 ML  3 mL Nebulization Q4H PRN    nitroglycerin (Tridil) 200 mcg/ml infusion  0-100 mcg/min IntraVENous TITRATE    morphine injection 2 mg  2 mg IntraVENous Q4H PRN    melatonin tablet 6 mg  6 mg Oral QHS PRN       Objective:      Physical Exam:  General Appearance: alert, pleasant, elderly  male in NAD   Chest: Clear throughout   Cardiovascular:  Regular rate and rhythm, no murmur. Abdomen:   Soft, non-tender, bowel sounds are active. Extremities: DP/PT palpable, right radial site CDI  Skin: Warm and dry. Data Review:   Recent Labs     02/15/21  0353 02/14/21  0327 02/13/21  0627   WBC 8.2 7.0 10.3   HGB 12.5 12.0* 15.4   HCT 37.3 37.3 45.6    170 197     Recent Labs     02/15/21  0353 02/14/21  0327 02/13/21  0627    135* 139   K 3.9 4.5 4.4   * 107 103   CO2 23 23 25   * 242* 271*   BUN 16 15 17   CREA 1.13 1.14 1.48*   CA 8.0* 8.6 9.2   MG 1.9  --  1.9   PHOS 2.8  --   --    ALB  --   --  3.7   TBILI  --   --  0.3   ALT  --   --  18   INR  --   --  1.0       Recent Labs     02/13/21  1805 02/13/21  0909 02/13/21  0627   TROIQ <0.05 <0.05 <0.05         Intake/Output Summary (Last 24 hours) at 2/15/2021 1340  Last data filed at 2/15/2021 0351  Gross per 24 hour   Intake 2825.55 ml   Output 2000 ml   Net 825.55 ml      Cardiographics     Telemetry: NSR  EKG: NSR  Cxray: \"Minimal diffuse interstitial prominence may represent minimal edema  or atypical viral pneumonia\"  ECHO: 2/13/2021  · LV: Estimated LVEF is 55 - 60%. Visually measured ejection fraction. Normal cavity size, wall thickness and systolic function (ejection fraction normal). · AV: Probably trileaflet aortic valve. · RV: Not well visualized.     Assessment:     Active Problems:    Paroxysmal atrial fibrillation ([address] (1/10/2020)      Hyperlipidemia, unspecified hyperlipidemia type (1/10/2020)      Tobacco use (1/10/2020)      Chronic obstructive pulmonary disease (HCC) ()      Type 2 diabetes mellitus with diabetic neuropathy ([facility] (12/7/2020)      Chest pain (2/13/2021)      Coronary artery disease involving native coronary artery of native heart with unstable angina pectoris ([facility] (2/14/2021)        Plan:   Unstable angina, CAD with history of three-vessel disease, declined CABG in 2018: s/p PCI/BOBBY to ostial LM. EKG negative for acute ischemia, troponin negative x2   Follow post-cath recovery orders  Continue IVF  Obtain EKG post-procedure  CBC,BMP in am  LDL 54.4, A1C 8.5  Continue ASA, statin, coreg, plavix  Will be on triple therapy (ASA,Plavix, and Eliquis x 1 month). Then stop ASA. No history of GIB according to patient. Will monitor. History of A.  Fib:  Resume eliquis in am   Continue BB     HTN: controlled   Continue Coreg      Type 2 diabetes with neuropathy: poorly controlled  A1C 8.5, manage per internal medicine  Would recommend DTC consult     PAD/History of left SFA angioplasty  Continue Neurontin     Tobacco use  Cessation counseled, nicotine patch continued      Mild elevation of Cr: improved  Monitor BMP in am  Hydrate for LOUIS protocol  Avoid nephrotoxic agents    Patient will stay overnight, plan for DC in am.     Phill Sawyer  DNP,APRN,AG-ACNP-BC

## 2021-02-15 NOTE — PROGRESS NOTES
Bedside shift change report given to [facility] (oncoming nurse) by Chu Mancia (offgoing nurse). Report included the following information SBAR.     3950  Patient finished with his shower, patient given snacks per patient request. Maintenance fluid NS infusing and nitroglycerin infusing, peripheral IV access patent and flushes well    2159  Patient refused frequent blood pressure monitoring \"Damn I want to sleep\"    2355  Patient called out \"my IV got undone, I fixed it back\" Nurse author upon assessment found the patient's IV intact but there's a few drops of blood on the floor, which was cleaned by the nurse author. The PIV flushed well and patent.     0350  Blood work done

## 2021-02-15 NOTE — DIABETES MGMT
[address] [facility]    CLINICAL NURSE SPECIALIST CONSULT   INITIAL NOTE    Initial Presentation   Jackie Fuentes is a 62 y.o. male admitted from the ER 2/13/21 with chest pain, palpitations and dyspnea. Patient noted (2) episodes of \"afib\" Friday that went away, followed by another incident on Satuday. He went to [facility] and then was transferred to \[facility]\"" for cardiology evaluation. Troponin Negative. EKG Negative for acute ischemia. CXR: Minimal diffuse interstitial prominence may represent minimal edema or atypical viral pneumonia. Echo: LV: Estimated LVEF is 55 - 60%. Visually measured ejection fraction. Normal cavity size, wall thickness and systolic function (ejection fraction normal). AV: Probably trileaflet aortic valve. RV: Not well visualized. HX:   Past Medical History:   Diagnosis Date    CAD (coronary artery disease)     Atrial Fibrillation     Chronic obstructive pulmonary disease ([address]     Diabetes [facility]     Hypertension       DX: Afib. Unstable angina. CAD. TX: 2/15/21 Successful PCI on ostial LMT. Clinical Course   Current course has been uncomplicated. Diabetes    Patient has known Type 2 diabetes since 2014, treated with oral agents PTA. Family history positive for diabetes brother and paternal grandfather. Admission  and A1c 8.5% indicate poor diabetes control. Consulted by Provider for advanced diabetes nursing assessment and care, specifically related to   [x] Inpatient management strategy    Diabetes-related medical history  Macrovascular disease  CAD    Diabetes medication history  Drug class Currently in use Discontinued Never used   Biguanide Metformin 1000mg twice daily     DDP-4 inhibitor       Sulfonylurea Glipizide XL 10mg twice daily     Thiazolidinedione      GLP-1 RA      SGLT-2 inhibitors      Basal insulin      Bolus insulin      Fixed Dose  Combinations        Subjective   I was doing better. My Aic was done to 7%.     Patient reports the following home diabetes self-care practices:  Eating pattern  [x] Breakfast Cereal. 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 Monitoring pattern  [x] Breakfast 120s  Taking medications pattern  [x] Consistent administration  [x] Affordable     Objective   Physical exam  General Alert, oriented and in no acute distress. Conversant and cooperative. Vital Signs Afebrile. NSR. Hypertensive. Visit Vitals  BP (!) 152/81   Pulse 96   Temp 97.8 °F (36.6 °C)   Resp 16   Ht 5' 9\" (1.753 m)   Wt 79.3 kg (174 lb 13.2 oz)   SpO2 96%   BMI 25.82 kg/m²     Laboratory  Tests Today 2/14 2/13/21   A1c   8.5%    242 271   Anion gap 6 5 11   Serum triglycerides  272    WBC 8.2 7 10.3   Serum creatinine 1.13 1.14 1.48   GFR >60 >60 49   AST   8   ALT   18   Troponin    Negative   NT pro-BNP   65     Factors impacting BG management  Factor Dose Comments   Nutrition:  Cardiac, Regular meals       Pain MS & nitro prn None used. Infection  Afebrile. WBC 8.2. Other: NOEMY  GFR 49 => >60.      Blood glucose pattern        Assessment and Plan   Nursing Diagnosis Risk for unstable blood glucose pattern   Nursing Intervention Domain 3705 Decision-making Support   Nursing Interventions Examined current inpatient diabetes control   Explored factors facilitating and impeding inpatient management  Identified self-management practices impeding diabetes control  Instructed patient in BG and A1c targets  Advised to monitor BG fasting, pre-dinner and bedtime-testing once daily but alternating time of test  Shared that his pancrease may not be able to make insulin at this point in time, and other options may be necessary  Discussed journaling meals, physical activity, and BGs for 2 weeks and bring to next PCP visit  Discussed (2) drug classes: GLP-1s and SGLT2s as options     Evaluation   This  male, with Type 2 diabetes, did not achieve diabetes control prior to admission (PTA), as evidenced by admission BG of 271 and A1c of 8.5%. Patient is quite surprised that BGs have risen as he has been attentive to his dietary practices, and remains quite physically active. He understands that his pancreas may not be able to make enough insulin at this point in his diabetes journey. He is willing to track data for the next two weeks so he and his PCP can discuss next steps. BGs have been in the 200s since admission. A small amount of insulin has been used without the benefit of his usual diabetes regimen (biguanide & sulfonylurea). Recommend restarting his home regimen to see if he can respond to his usual diabetes regimen. Recommendations      Option #1 (if he didn't get dye):    Glimiperide 4mg D OR Glipizide ER 10mg twice daily    Metformin ER 500mg twice daily    Continue corrective insulin     Option #2 (if he DID get dye): One time dose of NPH insulin 16 units now    Continue corrective insulin    [x] Referral to  [x] Diabetes Self-Management Training through [facility] (Phone [phone] to schedule appointment)    Billing Code(s)   [x] 55871 IP subsequent hospital care - 35 minutes [] 59084 Prolonged Services - 65 minutes [] 81064 Prolonged Services - 110 minutes  [] 51746 IP subsequent hospital care - 25 minutes [] 99697 Prolonged Services - 55 minutes [] 35366 Prolonged Services - 100 minutes  [] 95363 IP subsequent hospital care - 15 minutes [] 55742 Prolonged Services - 45 minutes [] 67824 Prolonged Services - 90 minutes    Before making these care recommendations, I personally reviewed the hospitalization record, including notes, laboratory & diagnostic data and current medications, and examined the patient at the bedside (circumstances permitting) before making care recommendations.      Total minutes: Romy Segura, CNS  Diabetes Clinical Nurse Specialist  [facility]  Access via [address] [place] [address]

## 2021-02-15 NOTE — PROGRESS NOTES
Hospitalist Progress Note    NAME: Frances Hunt   :  1960   MRN:  [id number]     I reviewed pertinent labs/imaging and discussed plan of care with Dr. Flores Abrams who is in agreement. Assessment / Plan:  Chest pain  CAD declined CABG in 2018  PAF  HTN  Dyslipidemia   Mildly elevated troponin  PAD s/p remote SFA angioplasty  CXR 21:  Minimal diffuse interstitial prominence may represent minimal edema  or atypical viral pneumonia. Echo 21:  · LV: Estimated LVEF is 55 - 60%. Visually measured ejection fraction. Normal cavity size, wall thickness and systolic function (ejection fraction normal). · AV: Probably trileaflet aortic valve. · RV: Not well visualized. - Continue asa 81 mg po daily. - Continue carvedilol 12.5 mg po bid. - Continue atorvastatin 40 mg po daily. - Continue NTG gtt.   - Hold lisinopril for now. - Hold apixaban for now. - Plan for cardiac cath today. Asymptomatic hyponatremia, resolved  - Monitor. Renal insuffiencey likely NOEMY, resolved  - Continue to hold ACEI at this time. - Continue to avoid nephrotoxins.   - Monitor. Acute anemia, resolved   - Patient had initial drop in H/H after admission but stable since. - Guaiac stool, pending.  - Monitor. COPD  Tobacco abuse  - Continue nicotine patch. - Smoking cessation reinforced. Poorly controlled DM II  Hyperglycemia   Neuropathy  HgbA1C 21:  8.5  - Continue to hold oral diabetes meds. - Add low dose basal (glargine) insulin, 4 units sc daily. - Continue FSBS with SSI correction scale. - Continue gabapentin 200 mg qam and 600 mg po qhs.       25.0 - 29.9 Overweight / Body mass index is 25.82 kg/m². Code status: Full  Prophylaxis: Lovenox  Recommended Disposition: Home w/Family     Subjective:     Chief Complaint / Reason for Physician Visit  Patient reports one episode of fleeting chest pain late yesterday afternoon, none since. Denies SOB.   Plan of care and pertinent events reviewed with bedside nurse. Review of Systems:  Symptom Y/N Comments  Symptom Y/N Comments   Fever/Chills N   Chest Pain N    Poor Appetite N   Edema N    Cough N   Abdominal Pain N    Sputum N   Joint Pain N    SOB/COCHRAN N   Pruritis/Rash N    Nausea/vomit N   Tolerating PT/OT     Diarrhea N   Tolerating Diet Y    Constipation N   Other       Could NOT obtain due to:      Objective:     VITALS:   Last 24hrs VS reviewed since prior progress note. Most recent are:  Patient Vitals for the past 24 hrs:   Temp Pulse Resp BP SpO2   02/15/21 0734 97.7 °F (36.5 °C) 92 16 (!) 140/78 95 %   02/15/21 0338 98.1 °F (36.7 °C) 76 20 131/67 92 %   02/14/21 2301 98 °F (36.7 °C) 87 18 120/68 95 %   02/14/21 2153  79  125/68    02/14/21 2148  80      02/14/21 1920 97.8 °F (36.6 °C) 86 18 125/70 95 %   02/14/21 1601 98 °F (36.7 °C) 77 18 125/71 94 %   02/14/21 1302  86  139/67    02/14/21 1157 98.1 °F (36.7 °C) 81 20 139/79 95 %   02/14/21 1100  87 24 (!) 147/84 94 %       Intake/Output Summary (Last 24 hours) at 2/15/2021 1024  Last data filed at 2/15/2021 0351  Gross per 24 hour   Intake 2843.55 ml   Output 2000 ml   Net 843.55 ml        I had a face to face encounter and independently examined this patient on 2/15/2021, as outlined below:  PHYSICAL EXAM:  General:  A/A/O X 3. NAD. HEENT:  Normocephalic. Sclera anicteric. EOMI. Mucous membranes moist.    Chest:  Resps even/unlabored with symmetrical CWE. Air entry diminished. Lungs CTA. No use of accessory muscles. CV:  RRR. Normal S1/S2. No M/C/R appreciated. No JVD. No peripheral edema. GI:  Abdomen soft/NT/ND. No organomegaly. No hernia. ABT X 4.    :  Voiding. Neurologic:  Nonfocal.  CN II-XII grossly intact. Face symmetrical.  Speech normal.     Psych:  Cooperative. No anxiety or agitation. No suicidal/homicidal ideation. Skin:  No rashes or jaundice.       Reviewed most current lab test results and cultures  YES  Reviewed most current radiology test results   YES  Review and summation of old records today    NO  Reviewed patient's current orders and MAR    YES  PMH/SH reviewed - no change compared to H&P  ________________________________________________________________________  Care Plan discussed with:    Comments   Patient [address]     Consultant                        Multidiciplinary team rounds were held today with , nursing, pharmacist and clinical coordinator. Patient's plan of care was discussed; medications were reviewed and discharge planning was addressed. ________________________________________________________________________  Alonso Olivo NP     Procedures: see electronic medical records for all procedures/Xrays and details which were not copied into this note but were reviewed prior to creation of Plan. LABS:  I reviewed today's most current labs and imaging studies.   Pertinent labs include:  Recent Labs     02/15/21  0353 02/14/21  0327 02/13/21  0627   WBC 8.2 7.0 10.3   HGB 12.5 12.0* 15.4   HCT 37.3 37.3 45.6    170 197     Recent Labs     02/15/21  0353 02/14/21  0327 02/13/21  0627    135* 139   K 3.9 4.5 4.4   * 107 103   CO2 23 23 25   * 242* 271*   BUN 16 15 17   CREA 1.13 1.14 1.48*   CA 8.0* 8.6 9.2   MG 1.9  --  1.9   PHOS 2.8  --   --    ALB  --   --  3.7   TBILI  --   --  0.3   ALT  --   --  18   INR  --   --  1.0       Signed: lAonso Olivo NP

## 2021-02-15 NOTE — PROGRESS NOTES
Transition of Care Plan:    Disposition:  Home with follow up appointments  Follow up appointments:  PCP and Cardiology appointments scheduled and on AVS  Transportation at Discharge: Pt will need Medicaid taxi to [facility] to get back to his personal vehicle  DME needed: No DME needed at this time  IM Medicare letter:  [employer], no IMM letters required    Once discharge identified, CM will assist with setting up Medicaid transport to [facility] where patient's private vehicle is located. Follow up appointments already arranged and on patient's AVS.    Care Management Interventions  PCP Verified by CM: Yes  Last Visit to PCP: 01/14/21  Palliative Care Criteria Met (RRAT>21 & CHF Dx)?: No  Mode of Transport at Discharge: Other (see comment)  Transition of Care Consult (CM Consult): Discharge Planning  MyChart Signup: No  Discharge Durable Medical Equipment: No  Physical Therapy Consult: No  Occupational Therapy Consult: No  Speech Therapy Consult: No  Current Support Network: Own Home, Lives Alone  Confirm Follow Up Transport: Self  Discharge Location  Discharge Placement: [facility] - [facility]  Advanced Steps ACP Facilitator  Zone Phone: [phone]

## 2021-02-16 ENCOUNTER — TELEPHONE (OUTPATIENT)
Dept: INTERNAL MEDICINE CLINIC | Age: 61
End: 2021-02-16

## 2021-02-16 VITALS
OXYGEN SATURATION: 95 % | HEIGHT: 69 IN | BODY MASS INDEX: 25.89 KG/M2 | SYSTOLIC BLOOD PRESSURE: 139 MMHG | DIASTOLIC BLOOD PRESSURE: 86 MMHG | WEIGHT: 174.82 LBS | HEART RATE: 87 BPM | TEMPERATURE: 97.8 F | RESPIRATION RATE: 16 BRPM

## 2021-02-16 LAB
ANION GAP SERPL CALC-SCNC: 6 MMOL/L (ref 5–15)
BUN SERPL-MCNC: 13 MG/DL (ref 6–20)
BUN/CREAT SERPL: 11 (ref 12–20)
CALCIUM SERPL-MCNC: 8.6 MG/DL (ref 8.5–10.1)
CHLORIDE SERPL-SCNC: 110 MMOL/L (ref 97–108)
CO2 SERPL-SCNC: 24 MMOL/L (ref 21–32)
CREAT SERPL-MCNC: 1.14 MG/DL (ref 0.7–1.3)
ERYTHROCYTE [DISTWIDTH] IN BLOOD BY AUTOMATED COUNT: 12.3 % (ref 11.5–14.5)
GLUCOSE BLD STRIP.AUTO-MCNC: 278 MG/DL (ref 65–100)
GLUCOSE SERPL-MCNC: 217 MG/DL (ref 65–100)
HCT VFR BLD AUTO: 42.5 % (ref 36.6–50.3)
HGB BLD-MCNC: 13.9 G/DL (ref 12.1–17)
MCH RBC QN AUTO: 28.5 PG (ref 26–34)
MCHC RBC AUTO-ENTMCNC: 32.7 G/DL (ref 30–36.5)
MCV RBC AUTO: 87.1 FL (ref 80–99)
NRBC # BLD: 0 K/UL (ref 0–0.01)
NRBC BLD-RTO: 0 PER 100 WBC
PLATELET # BLD AUTO: 197 K/UL (ref 150–400)
PMV BLD AUTO: 11.4 FL (ref 8.9–12.9)
POTASSIUM SERPL-SCNC: 3.8 MMOL/L (ref 3.5–5.1)
RBC # BLD AUTO: 4.88 M/UL (ref 4.1–5.7)
SERVICE CMNT-IMP: ABNORMAL
SODIUM SERPL-SCNC: 140 MMOL/L (ref 136–145)
WBC # BLD AUTO: 8.1 K/UL (ref 4.1–11.1)

## 2021-02-16 PROCEDURE — 36415 COLL VENOUS BLD VENIPUNCTURE: CPT

## 2021-02-16 PROCEDURE — 85027 COMPLETE CBC AUTOMATED: CPT

## 2021-02-16 PROCEDURE — 74011250637 HC RX REV CODE- 250/637: Performed by: EMERGENCY MEDICINE

## 2021-02-16 PROCEDURE — 80048 BASIC METABOLIC PNL TOTAL CA: CPT

## 2021-02-16 PROCEDURE — 74011636637 HC RX REV CODE- 636/637: Performed by: INTERNAL MEDICINE

## 2021-02-16 PROCEDURE — 99232 SBSQ HOSP IP/OBS MODERATE 35: CPT | Performed by: INTERNAL MEDICINE

## 2021-02-16 PROCEDURE — 82962 GLUCOSE BLOOD TEST: CPT

## 2021-02-16 PROCEDURE — 74011250637 HC RX REV CODE- 250/637: Performed by: INTERNAL MEDICINE

## 2021-02-16 PROCEDURE — 74011636637 HC RX REV CODE- 636/637: Performed by: NURSE PRACTITIONER

## 2021-02-16 PROCEDURE — 74011250637 HC RX REV CODE- 250/637: Performed by: NURSE PRACTITIONER

## 2021-02-16 RX ORDER — IBUPROFEN 200 MG
1 TABLET ORAL DAILY
Qty: 30 PATCH | Refills: 0 | Status: SHIPPED | OUTPATIENT
Start: 2021-02-16 | End: 2021-03-18

## 2021-02-16 RX ORDER — INSULIN GLARGINE 100 [IU]/ML
4 INJECTION, SOLUTION SUBCUTANEOUS
Qty: 1 PEN | Refills: 0 | Status: SHIPPED | OUTPATIENT
Start: 2021-02-16 | End: 2021-03-18

## 2021-02-16 RX ORDER — CLOPIDOGREL BISULFATE 75 MG/1
75 TABLET ORAL DAILY
Qty: 30 TAB | Refills: 3 | Status: SHIPPED | OUTPATIENT
Start: 2021-02-16 | End: 2021-07-01 | Stop reason: SDUPTHER

## 2021-02-16 RX ORDER — PEN NEEDLE, DIABETIC 29 G X1/2"
30 NEEDLE, DISPOSABLE MISCELLANEOUS
Qty: 30 PEN NEEDLE | Refills: 0 | Status: SHIPPED | OUTPATIENT
Start: 2021-02-16

## 2021-02-16 RX ADMIN — CLOPIDOGREL BISULFATE 75 MG: 75 TABLET ORAL at 09:18

## 2021-02-16 RX ADMIN — Medication 10 ML: at 04:20

## 2021-02-16 RX ADMIN — APIXABAN 5 MG: 5 TABLET, FILM COATED ORAL at 09:17

## 2021-02-16 RX ADMIN — CARVEDILOL 12.5 MG: 12.5 TABLET, FILM COATED ORAL at 09:17

## 2021-02-16 RX ADMIN — GABAPENTIN 200 MG: 100 CAPSULE ORAL at 09:17

## 2021-02-16 RX ADMIN — INSULIN LISPRO 5 UNITS: 100 INJECTION, SOLUTION INTRAVENOUS; SUBCUTANEOUS at 09:18

## 2021-02-16 RX ADMIN — ASPIRIN 81 MG: 81 TABLET, COATED ORAL at 09:17

## 2021-02-16 RX ADMIN — INSULIN GLARGINE 4 UNITS: 100 INJECTION, SOLUTION SUBCUTANEOUS at 09:18

## 2021-02-16 NOTE — DISCHARGE SUMMARY
Hospitalist Discharge Summary     Patient ID:  Akash Jha  [id number]  2 y.o.  1960  2/13/2021    PCP on record: Justina Francis MD    Admit date: 2/13/2021  Discharge date and time: 2/16/2021    DISCHARGE DIAGNOSIS:    Chest pain  CAD declined CABG in 2018  PAF  HTN  Dyslipidemia   Mildly elevated troponin  PAD s/p remote SFA angioplasty  Asymptomatic hyponatremia, resolved  Renal insuffiencey likely NOEMY, resolved  Acute anemia, resolved   COPD  Tobacco abuse  Poorly controlled DM II  Hyperglycemia   Neuropathy      CONSULTATIONS:  IP CONSULT TO CARDIOLOGY  IP CONSULT TO HOSPITALIST    Excerpted HPI from H&P of Augustina Braswell MD:  Akash Jha is a 62 y.o.  male with PMHx significant for CAD, type 2 diabetes with neuropathy, hypertension, hyperlipidemia, history of A. fib, active tobacco use, presented to [address] [place] [address] for evaluation of SOB and palpitations. Symptoms worst this morning where he felt his heart was coming out of his chest followed by chest discomfort. Symptom onset was several days ago associated with palpitations. Patient has history of CAD and was evaluated at [facility] in 2018 and was offered CABG. He declined CABG at the time and lost to follow-up with any cardiologist.  He follows closely with his PCP. Patient was subsequently transferred to [facility] for further evaluation. He received ASA and SLN prior to his transfer. CP resolved. He currently has no complaints. Vitals/labs/imaging reviewed  We were asked to admit for work up and evaluation of the above problems  ______________________________________________________________________  DISCHARGE SUMMARY/HOSPITAL COURSE:  for full details see H&P, daily progress notes, labs, consult notes.    Chest pain  CAD declined CABG in 2018  PAF  HTN  Dyslipidemia   Mildly elevated troponin  PAD s/p remote SFA angioplasty  CXR 02/13/21:  Minimal diffuse interstitial prominence may represent minimal edema  or atypical viral pneumonia. Echo 02/13/21:  · LV: Estimated LVEF is 55 - 60%. Visually measured ejection fraction. Normal cavity size, wall thickness and systolic function (ejection fraction normal). · AV: Probably trileaflet aortic valve. · RV: Not well visualized. - S/p Cardiac Cath 2/15 with PCI to ostial LM  - Appreciate Cardiology input   - Continue asa 81 mg po daily for on month at discharge   - Continue carvedilol 12.5 mg po bid. - Continue atorvastatin 40 mg po daily.  - Resume lisinopril  - Continue apixaban and plavix   -Patient already has follow up with PCP and Cardiology follow up appointments   Asymptomatic hyponatremia, resolved   Renal insuffiencey likely NOEMY, resolved  - Resume Lisinopril    Acute anemia, resolved   - Patient had initial drop in H/H after admission but stable since. - Unable to obtain hemoccult stool   COPD  Tobacco abuse  - Continue nicotine patch. - Smoking cessation reinforced. Poorly controlled DM II  Hyperglycemia   Neuropathy  HgbA1C 02/13/21:  8.5  - Resume oral diabetes meds. - Continue low dose basal (glargine) insulin, 4 units sc daily.   - Patient to keep glucose diary and take to PCP appt   - Continue gabapentin 200 mg qam and 600 mg po qhs. Patient seen at bedside, he is anxious to go home. Denies any CP or SOB and has been walking the hallways. Discussed hospitalization and discharge instructions, patient verbalized understanding.   _________________________________________________________________  Patient seen and examined by me on discharge day. Pertinent Findings:  Gen:    Not in distress  Chest: Clear lungs  CVS:   Regular rhythm. No edema  Abd:  Soft, not distended, not tender  Neuro:  Alert and oriented x3  _______________________________________________________________________  DISCHARGE MEDICATIONS:   Current Discharge Medication List      START taking these medications    Details   clopidogreL (PLAVIX) 75 mg tab Take 1 Tab by mouth daily.   Qty: 30 Tab, Refills: 3      nicotine (NICODERM CQ) 21 mg/24 hr 1 Patch by TransDERmal route daily for 30 days. Qty: 30 Patch, Refills: 0      insulin glargine (LANTUS,BASAGLAR) 100 unit/mL (3 mL) inpn 4 Units by SubCUTAneous route nightly for 30 days. Qty: 1 Pen, Refills: 0      Insulin Needles, Disposable, (Lite Touch Insulin Pen Needles) 31 gauge x 1/4\" ndle 30 Pen Needle by Does Not Apply route nightly. Qty: 30 Pen Needle, Refills: 0         CONTINUE these medications which have NOT CHANGED    Details   terbinafine HCL (LAMISIL) 250 mg tablet Take 250 mg by mouth daily. glipiZIDE SR (GLUCOTROL XL) 10 mg CR tablet Take 1 Tab by mouth two (2) times a day. Indications: type 2 diabetes mellitus  Qty: 180 Tab, Refills: 0    Associated Diagnoses: Controlled type 2 diabetes mellitus without complication, without long-term current use of insulin ([address]      ! ! gabapentin (NEURONTIN) 100 mg capsule Take 2 Caps by mouth Every morning. Max Daily Amount: 200 mg. Qty: 180 Cap, Refills: 0    Associated Diagnoses: Type 2 diabetes mellitus with diabetic neuropathy, without long-term current use of insulin ([address]      ! ! gabapentin (NEURONTIN) 300 mg capsule Take 2 Caps by mouth nightly. Max Daily Amount: 600 mg. Qty: 180 Cap, Refills: 0    Associated Diagnoses: Type 2 diabetes mellitus with diabetic neuropathy, without long-term current use of insulin (HCC)      aspirin delayed-release 81 mg tablet Take 1 Tab by mouth daily. Qty: 90 Tab, Refills: 3    Associated Diagnoses: Controlled type 2 diabetes mellitus without complication, without long-term current use of insulin (HCC)      atorvastatin (LIPITOR) 40 mg tablet Take 1 Tab by mouth daily. Indications: excessive fat in the blood  Qty: 90 Tab, Refills: 3    Associated Diagnoses: Controlled type 2 diabetes mellitus without complication, without long-term current use of insulin (HCC)      carvediloL (COREG) 12.5 mg tablet Take 1 Tab by mouth two (2) times daily (with meals). Indications: atrial fibrillation  Qty: 180 Tab, Refills: 3    Associated Diagnoses: Paroxysmal atrial fibrillation (HCC)      lisinopriL (PRINIVIL, ZESTRIL) 10 mg tablet Take 1 Tab by mouth daily. Indications: high blood pressure  Qty: 90 Tab, Refills: 3    Associated Diagnoses: Controlled type 2 diabetes mellitus without complication, without long-term current use of insulin (HCC)      metFORMIN (GLUCOPHAGE) 1,000 mg tablet Take 1 Tab by mouth two (2) times daily (with meals). Indications: type 2 diabetes mellitus  Qty: 180 Tab, Refills: 3    Associated Diagnoses: Controlled type 2 diabetes mellitus without complication, without long-term current use of insulin ([facility])      apixaban (Eliquis) 5 mg tablet Take 1 Tab by mouth two (2) times a day. Indications: treatment to prevent blood clots in chronic atrial fibrillation  Qty: 180 Tab, Refills: 3    Associated Diagnoses: Paroxysmal atrial fibrillation (HCC)      multivitamin (ONE A DAY) tablet Take 1 Tab by mouth daily. !! - Potential duplicate medications found. Please discuss with provider. Patient Follow Up Instructions:    Activity: See surgical instructions  Diet: Cardiac Diet and Diabetic Diet  Wound Care: As directed    Follow-up Information     Follow up With Specialties Details Why Contact Info    Brayan Schmidt MD Cardiology, [address] [place] [address] Vascular Surgery On 3/1/2021 For hospital follow up; 2:15 pm, arrive at 2 pm  [facility]  [address]       Lawrence Zamarripa MD Internal Medicine On 2/26/2021 1:15pm follow up with PCP [address] [place] [address]  [phone]          ________________________________________________________________    Risk of deterioration: Low    Condition at Discharge:  Stable  __________________________________________________________________    Disposition  Home with family, no needs    ____________________________________________________________________    Code Status: Full Code  ___________________________________________________________________      Total time in minutes spent coordinating this discharge (includes going over instructions, follow-up, prescriptions, and preparing report for sign off to her PCP) :  >30 minutes    Signed:  Mercy Gee NP

## 2021-02-16 NOTE — PROGRESS NOTES
[address]  [phone]      Cardiology Progress Note      2/16/2021 0930 AM     Admit Date: 2/13/2021    Admit Diagnosis:   Chest pain [R07.9]    Subjective:     Janice Benavides is s/p BOBBY/PCI to Ostial LM POD 1. No cardiac complaints. VSS. BP up a little, resuming lisinopril at DC.    Labs stable, Cr at baseline 1.14        Visit Vitals  BP (!) 156/90 (BP 1 Location: Left upper arm, BP Patient Position: At rest)   Pulse 97   Temp 98.2 °F (36.8 °C)   Resp 16   Ht 5' 9\" (1.753 m)   Wt 79.3 kg (174 lb 13.2 oz)   SpO2 95%   BMI 25.82 kg/m²       Current Facility-Administered Medications   Medication Dose Route Frequency    insulin glargine (LANTUS) injection 4 Units  4 Units SubCUTAneous DAILY    clopidogreL (PLAVIX) tablet 75 mg  75 mg Oral DAILY    sodium chloride (NS) flush 5-40 mL  5-40 mL IntraVENous Q8H    sodium chloride (NS) flush 5-40 mL  5-40 mL IntraVENous PRN    naloxone (NARCAN) injection 0.4 mg  0.4 mg IntraVENous PRN    apixaban (ELIQUIS) tablet 5 mg  5 mg Oral BID    nicotine (NICODERM CQ) 21 mg/24 hr patch 1 Patch  1 Patch TransDERmal DAILY    sodium chloride (NS) flush 5-40 mL  5-40 mL IntraVENous Q8H    sodium chloride (NS) flush 5-40 mL  5-40 mL IntraVENous PRN    acetaminophen (TYLENOL) tablet 650 mg  650 mg Oral Q6H PRN    Or    acetaminophen (TYLENOL) suppository 650 mg  650 mg Rectal Q6H PRN    polyethylene glycol (MIRALAX) packet 17 g  17 g Oral DAILY PRN    promethazine (PHENERGAN) tablet 12.5 mg  12.5 mg Oral Q6H PRN    Or    ondansetron (ZOFRAN) injection 4 mg  4 mg IntraVENous Q6H PRN    insulin lispro (HUMALOG) injection   SubCUTAneous AC&HS    glucose chewable tablet 16 g  4 Tab Oral PRN    dextrose (D50W) injection syrg 12.5-25 g  12.5-25 g IntraVENous PRN    glucagon (GLUCAGEN) injection 1 mg  1 mg IntraMUSCular PRN    aspirin delayed-release tablet 81 mg  81 mg Oral DAILY    atorvastatin (LIPITOR) tablet 40 mg  40 mg Oral DAILY    gabapentin (NEURONTIN) capsule 600 mg  600 mg Oral QHS    nitroglycerin (NITROSTAT) tablet 0.4 mg  0.4 mg SubLINGual PRN    carvediloL (COREG) tablet 12.5 mg  12.5 mg Oral BID WITH MEALS    gabapentin (NEURONTIN) capsule 200 mg  200 mg Oral QAM    albuterol-ipratropium (DUO-NEB) 2.5 MG-0.5 MG/3 ML  3 mL Nebulization Q4H PRN    morphine injection 2 mg  2 mg IntraVENous Q4H PRN    melatonin tablet 6 mg  6 mg Oral QHS PRN       Objective:      Physical Exam:  General Appearance: alert, pleasant, elderly  male in NAD   Chest: Clear throughout   Cardiovascular:  Regular rate and rhythm, no murmur. Abdomen:   Soft, non-tender, bowel sounds are active. Extremities: DP/PT palpable, right radial site CDI  Skin: Warm and dry. Data Review:   Recent Labs     02/16/21  0419 02/15/21  0353 02/14/21  0327   WBC 8.1 8.2 7.0   HGB 13.9 12.5 12.0*   HCT 42.5 37.3 37.3    167 170     Recent Labs     02/16/21  0419 02/15/21  0353 02/14/21  0327    139 135*   K 3.8 3.9 4.5   * 110* 107   CO2 24 23 23   * 265* 242*   BUN 13 16 15   CREA 1.14 1.13 1.14   CA 8.6 8.0* 8.6   MG  --  1.9  --    PHOS  --  2.8  --        Recent Labs     02/13/21  1805   TROIQ <0.05         Intake/Output Summary (Last 24 hours) at 2/16/2021 8701  Last data filed at 2/15/2021 2255  Gross per 24 hour   Intake 250 ml   Output 350 ml   Net -100 ml      Cardiographics     Telemetry: NSR  EKG: NSR  Cxray: \"Minimal diffuse interstitial prominence may represent minimal edema  or atypical viral pneumonia\"  ECHO: 2/13/2021  · LV: Estimated LVEF is 55 - 60%. Visually measured ejection fraction. Normal cavity size, wall thickness and systolic function (ejection fraction normal). · AV: Probably trileaflet aortic valve. · RV: Not well visualized.     Assessment:     Active Problems:    Paroxysmal atrial fibrillation ([facility] (1/10/2020)      Hyperlipidemia, unspecified hyperlipidemia type (1/10/2020) Tobacco use (1/10/2020)      Chronic obstructive pulmonary disease (HCC) ()      Type 2 diabetes mellitus with diabetic neuropathy ([facility] (12/7/2020)      Chest pain (2/13/2021)      Coronary artery disease involving native coronary artery of native heart with unstable angina pectoris [facility] (2/14/2021)      S/P cardiac cath (2/15/2021)      Overview: 2/15/2021: s/p BOBBY/PCI to ostial LM        Plan:   Unstable angina, CAD with history of three-vessel disease, declined CABG in 2018: s/p PCI/BOBBY to ostial LM. EKG negative for acute ischemia, troponin negative x2   Obtain EKG post-procedure-no acute changes   CBC,BMP this am were stable   LDL 54.4, A1C 8.5  Continue ASA, statin, coreg, plavix  Will be on triple therapy (ASA,Plavix, and Eliquis x 1 month). Then stop ASA on 3/17/2021. No history of GIB according to patient. Will monitor. History of A. Fib:  Resume eliquis at DC  Continue BB     HTN: elevated slightly   Continue Coreg and resume lisinopril at DC      Type 2 diabetes with neuropathy: poorly controlled  A1C 8.5, manage per internal medicine  Would recommend DTC consult-following, saw patient     PAD/History of left SFA angioplasty  Continue Neurontin     Tobacco use  Cessation counseled, nicotine patch continued, patient left floor this am to smoke. Discussed again need to cease smoking. Will reinforce at every outpatient appointment.      Mild elevation of Cr: improved  BMP stabilized, resume ACEi    Plan for DC, has outpatient follow-up scheduled with Dr. Melissa Herr.      Roberto ROONEY,AC  DNP,APRN,AG-ACNP-BC

## 2021-02-16 NOTE — PROGRESS NOTES
Transition of Care Plan:     Disposition:  Home with follow up appointments  Follow up appointments:  PCP and Cardiology appointments scheduled and on AVS  Transportation at Discharge: Medicaid cab requested for 10:00am to transport to [facility]  DME needed: No DME needed at this time  IM Medicare letter:  [employer], no IMM letters required    No other Care Management needs identified at this time. Pt ready to discharge from a CM standpoint.       Pt ready for discharge. Medicaid cab requested through [employer] for 10:00am transport to take patient to [facility] to get to his personal vehicle. Follow up appointments already arranged and on patient's AVS.     Care Management Interventions  PCP Verified by CM: Yes  Last Visit to PCP: 01/14/21  Palliative Care Criteria Met (RRAT>21 & CHF Dx)?: No  Mode of Transport at Discharge: Other (see comment)  Transition of Care Consult (CM Consult): Discharge Planning  MyChart Signup: No  Discharge Durable Medical Equipment: No  Physical Therapy Consult: No  Occupational Therapy Consult: No  Speech Therapy Consult: No  Current Support Network: Own Home, Lives Alone  Confirm Follow Up Transport: Self  Discharge Location  Discharge Placement: [address] [facility] [address], [address] - [address] [place]  [facility] [employer] ACP Facilitator  Zone Phone: [phone]

## 2021-02-16 NOTE — ROUTINE PROCESS
Patient left floor and was found downstairs stating he was going to get something from the cafeteria. When patient returned he stated that he went outside and smoked a cigarette. Jonathan Haji NP notified and aware.

## 2021-02-16 NOTE — TELEPHONE ENCOUNTER
Please advise if PCP recommends patient wait 2 weeks to be seen after hospital discharge. Message from Loenardo Boston sent at 2/15/2021  4:23 PM EST -----  Regarding: /telephone  Contact: [phone]  General Message/Vendor Calls    Caller's first and last name:N/A      Reason for call: He is in [facility] he had a stent place in his heart. His A1C was 8.5, he is expected to be discharged tomorrow. He will be taking his blood sugar twice a day and write down everything he eats and would like to follow up in 2 weeks to further discuss. He has CHF, and unstable angina he is unsure if it went away with the placement of the stent. His heart looked healthy. Callback required yes/no and why: Yes with appt.       Best contact number(s):[phone]        Details to clarify the request:N/A      Leonardo Boston

## 2021-02-16 NOTE — PROGRESS NOTES
End of Shift Note    Bedside shift change report given to Archana Pugh (oncoming nurse) by Chanelle Lance (offgoing nurse). Report included the following information SBAR, Kardex, Recent Results and Cardiac Rhythm NSR    Shift worked:  7p-7a     Shift summary and any significant changes:     Vitals stable, R radial site CDI. Concerns for physician to address:  patient requesting that home dose of lisinopril be reordered. Zone phone for oncoming shift:   [phone]       Activity:  Activity Level: Up ad oliver  Number times ambulated in hallways past shift: 2  Number of times OOB to chair past shift: 1    Cardiac:   Cardiac Monitoring: Yes      Cardiac Rhythm: Normal sinus rhythm    Access:   Current line(s): PIV     Genitourinary:   Urinary status: voiding    Respiratory:   O2 Device: Room air  Chronic home O2 use?: NO  Incentive spirometer at bedside: NO     GI:     Current diet:  DIET ONE TIME MESSAGE  DIET CARDIAC Regular  DIET ONE TIME MESSAGE  DIET ONE TIME MESSAGE  Passing flatus: YES  Tolerating current diet: YES  % Diet Eaten: 100 %    Pain Management:   Patient states pain is manageable on current regimen: YES    Skin:  Marc Score: 23  Interventions: increase time out of bed    Patient Safety:  Fall Score:  Total Score: 1       Length of Stay:  Expected LOS: 2d 2h  Actual LOS: [address] [facility]

## 2021-02-16 NOTE — PROGRESS NOTES
Spiritual Care Assessment/Progress Note  [facility]      NAME: Cooper Franz      MRN: [id number]  AGE: 62 y.o.  SEX: male  Episcopal Affiliation: Non Buddhism   Language: English     2/16/2021     Total Time (in minutes): 20     Spiritual Assessment begun in [facility] CARD[facility] through conversation with:         [x]Patient        [] Family    [] Friend(s)        Reason for Consult: Advance medical directive consult     Spiritual beliefs: (Please include comment if needed)     [] Identifies with a caesar tradition:         [] Supported by a caesar community:            [] Claims no spiritual orientation:           [] Seeking spiritual identity:                [] Adheres to an individual form of spirituality:           [x] Not able to assess:                           Identified resources for coping:      [] Prayer                               [] Music                  [] Guided Imagery     [] Family/friends                 [] Pet visits     [] Devotional reading                         [x] Unknown     [] Other:                                               Interventions offered during this visit: (See comments for more details)    Patient Interventions: Advance medical directive completed, Initial visit, Initial/Spiritual assessment, patient floor           Plan of Care:     [] Support spiritual and/or cultural needs    [x] Support AMD and/or advance care planning process      [] Support grieving process   [] Coordinate Rites and/or Rituals    [] Coordination with community clergy   [] No spiritual needs identified at this time   [] Detailed Plan of Care below (See Comments)  [] Make referral to Music Therapy  [] Make referral to Pet Therapy     [] Make referral to Addiction services  [] Make referral to [facility]  [] Make referral to Spiritual Care Partner  [] No future visits requested        [x] Follow up upon further referrals     Comments:    Responded to request for an Advance Medical Directive (AMD) consult for Mr. Bev Crabtree on 2159. Consulted with patients nurse. Patient was in NP then MD,  waited outside the room. After the consult is over,  reviewed the AMD with Mr. Bev Crabtree, and he said he would wait.  gave him another AMD form just in case, advised 24/7 AMD assistance availability.  offered the opportunity of further pastoral care, patient declined it by saying he is doing okay.      [id number] [facility] Kenroy Langston., M.S., Th.M.  Spiritual Care Provider   Paging Service [phone])

## 2021-02-16 NOTE — ACP (ADVANCE CARE PLANNING)
Responded to request for an Advance Medical Directive (AMD) consult for Mr. Halina Mendoza on 2159. Consulted with patients nurse. Patient was in NP then MD,  waited outside the room. After the consult is over,  reviewed the AMD with Mr. Halina Mendoza, and he said he would wait.  gave him another AMD form just in case, advised 24/7 AMD assistance availability.  offered the opportunity of further pastoral care, patient declined it by saying he is doing okay.      [id number] [facility] Kenroy Langston., M.S., Th.M.  Spiritual Care Provider   Paging Service [phone])

## 2021-02-16 NOTE — TELEPHONE ENCOUNTER
Return call made to patient. Patient will be discharged from [facility] on 2/16/21. Patient will come into the office for a transition of care visit on 2/23/21 at 230 with PCP. Patient will keep note of blood sugar and give report during his visit.

## 2021-02-16 NOTE — DISCHARGE INSTRUCTIONS
Patient Education        Coronary Angiogram: What to Expect at Home  Your Recovery    A coronary angiogram is a test to examine the large blood vessel of your heart (coronary artery). The doctor inserted a thin, flexible tube (catheter) into a blood vessel in your groin. In some cases, the catheter is placed in a blood vessel in the arm. Your groin or arm may have a bruise and feel sore for a day or two after the procedure. You can do light activities around the house but nothing strenuous for several days. This care sheet gives you a general idea about how long it will take for you to recover. But each person recovers at a different pace. Follow the steps below to feel better as quickly as possible. How can you care for yourself at home? Activity    · If the doctor gave you a sedative:  ? For 24 hours, don't do anything that requires attention to detail, such as going to work, making important decisions, or signing any legal documents. It takes time for the medicine's effects to completely wear off.  ? For your safety, do not drive or operate any machinery that could be dangerous. Wait until the medicine wears off and you can think clearly and react easily.     · Do not do strenuous exercise and do not lift, pull, or push anything heavy until your doctor says it is okay. This may be for a day or two. You can walk around the house and do light activity, such as cooking.     · If the catheter was placed in your groin, try not to walk up stairs for the first couple of days.     · If the catheter was placed in your arm near your wrist, do not bend your wrist deeply for the first couple of days. Be careful using your hand to get into and out of a chair or bed.     · If your doctor recommends it, get more exercise. Walking is a good choice. Bit by bit, increase the amount you walk every day. Try for at least 30 minutes on most days of the week. Diet    · Drink plenty of fluids to help your body flush out the dye. If you have kidney, heart, or liver disease and have to limit fluids, talk with your doctor before you increase the amount of fluids you drink.     · Keep eating a heart-healthy diet that has lots of fruits, vegetables, and whole grains. If you have not been eating this way, talk to your doctor. You also may want to talk to a dietitian. This expert can help you to learn about healthy foods and plan meals. Medicines    · Your doctor will tell you if and when you can restart your medicines. He or she will also give you instructions about taking any new medicines.     · If you take aspirin or some other blood thinner, ask your doctor if and when to start taking it again. Make sure that you understand exactly what your doctor wants you to do.     · Your doctor may prescribe a blood-thinning medicine like aspirin or clopidogrel (Plavix). It is very important that you take these medicines exactly as directed in order to keep the coronary artery open and reduce your risk of a heart attack. Be safe with medicines. Call your doctor if you think you are having a problem with your medicine. Care of the catheter site    · For 1 or 2 days, keep a bandage over the spot where the catheter was inserted. The bandage probably will fall off in this time.     · Put ice or a cold pack on the area for 10 to 20 minutes at a time to help with soreness or swelling. Put a thin cloth between the ice and your skin.     · You may shower 24 to 48 hours after the procedure, if your doctor okays it. Pat the incision dry.     · Do not soak the catheter site until it is healed. Don't take a bath for 1 week, or until your doctor tells you it is okay.     · Watch for bleeding from the site. A small amount of blood (up to the size of a quarter) on the bandage can be normal.     · If you are bleeding, lie down and press on the area for 15 minutes to try to make it stop. If the bleeding does not stop, call your doctor or seek immediate medical care. Follow-up care is a key part of your treatment and safety. Be sure to make and go to all appointments, and call your doctor if you are having problems. It's also a good idea to know your test results and keep a list of the medicines you take. When should you call for help? Call 911 anytime you think you may need emergency care. For example, call if:    · You passed out (lost consciousness).     · You have severe trouble breathing.     · You have sudden chest pain and shortness of breath, or you cough up blood.     · You have symptoms of a heart attack. These may include:  ? Chest pain or pressure, or a strange feeling in the chest.  ? Sweating. ? Shortness of breath. ? Nausea or vomiting. ? Pain, pressure, or a strange feeling in the back, neck, jaw, or upper belly, or in one or both shoulders or arms. ? Lightheadedness or sudden weakness. ? A fast or irregular heartbeat. After you call 911, the  may tel you to chew 1 adult-strength or 2 to 4 low-dose aspirin. Wait for an ambulance. Do not try to drive yourself.     · You have been diagnosed with angina, and you have symptoms that do not go away with rest or are not getting better within 5 minutes after you take a dose of nitroglycerin. Call your doctor now or seek immediate medical care if:    · You are bleeding from the area where the catheter was put in your artery.     · You have a fast-growing, painful lump at the catheter site.     · You have signs of infection, such as:  ? Increased pain, swelling, warmth, or redness. ? Red streaks leading from the catheter site. ? Pus draining from the catheter site. ? A fever.     · Your leg, arm, or hand is painful, looks blue, or feels cold, numb, or tingly. Watch closely for changes in your health, and be sure to contact your doctor if you have any problems. Where can you learn more?   Go to http://www.gray.com/  Enter D192 in the search box to learn more about \"Coronary Angiogram: What to Expect at Home. \"  Current as of: December 16, 2019               Content Version: 12.6  © 3564-5361 [employer]. Care instructions adapted under license by [employer] (which disclaims liability or warranty for this information). If you have questions about a medical condition or this instruction, always ask your healthcare professional. [address] any warranty or liability for your use of this information. Patient Education        Learning About Diabetes Food Guidelines  Your Care Instructions     Meal planning is important to manage diabetes. It helps keep your blood sugar at a target level (which you set with your doctor). You don't have to eat special foods. You can eat what your family eats, including sweets once in a while. But you do have to pay attention to how often you eat and how much you eat of certain foods. You may want to work with a dietitian or a certified diabetes educator (CDE) to help you plan meals and snacks. A dietitian or CDE can also help you lose weight if that is one of your goals. What should you know about eating carbs? Managing the amount of carbohydrate (carbs) you eat is an important part of healthy meals when you have diabetes. Carbohydrate is found in many foods. · Learn which foods have carbs. And learn the amounts of carbs in different foods. ? Bread, cereal, pasta, and rice have about 15 grams of carbs in a serving. A serving is 1 slice of bread (1 ounce), ½ cup of cooked cereal, or 1/3 cup of cooked pasta or rice. ? Fruits have 15 grams of carbs in a serving. A serving is 1 small fresh fruit, such as an apple or orange; ½ of a banana; ½ cup of cooked or canned fruit; ½ cup of fruit juice; 1 cup of melon or raspberries; or 2 tablespoons of dried fruit. ? Milk and no-sugar-added yogurt have 15 grams of carbs in a serving. A serving is 1 cup of milk or 2/3 cup of no-sugar-added yogurt.   ? Starchy vegetables have 15 grams of carbs in a serving. A serving is ½ cup of mashed potatoes or sweet potato; 1 cup winter squash; ½ of a small baked potato; ½ cup of cooked beans; or ½ cup cooked corn or green peas. · Learn how much carbs to eat each day and at each meal. A dietitian or CDE can teach you how to keep track of the amount of carbs you eat. This is called carbohydrate counting. · If you are not sure how to count carbohydrate grams, use the Plate Method to plan meals. It is a good, quick way to make sure that you have a balanced meal. It also helps you spread carbs throughout the day. ? Divide your plate by types of foods. Put non-starchy vegetables on half the plate, meat or other protein food on one-quarter of the plate, and a grain or starchy vegetable in the final quarter of the plate. To this you can add a small piece of fruit and 1 cup of milk or yogurt, depending on how many carbs you are supposed to eat at a meal.  · Try to eat about the same amount of carbs at each meal. Do not \"save up\" your daily allowance of carbs to eat at one meal.  · Proteins have very little or no carbs per serving. Examples of proteins are beef, chicken, turkey, fish, eggs, tofu, cheese, cottage cheese, and peanut butter. A serving size of meat is 3 ounces, which is about the size of a deck of cards. Examples of meat substitute serving sizes (equal to 1 ounce of meat) are 1/4 cup of cottage cheese, 1 egg, 1 tablespoon of peanut butter, and ½ cup of tofu. How can you eat out and still eat healthy? · Learn to estimate the serving sizes of foods that have carbohydrate. If you measure food at home, it will be easier to estimate the amount in a serving of restaurant food. · If the meal you order has too much carbohydrate (such as potatoes, corn, or baked beans), ask to have a low-carbohydrate food instead. Ask for a salad or green vegetables.   · If you use insulin, check your blood sugar before and after eating out to help you plan how much to eat in the future. · If you eat more carbohydrate at a meal than you had planned, take a walk or do other exercise. This will help lower your blood sugar. What else should you know? · Limit saturated fat, such as the fat from meat and dairy products. This is a healthy choice because people who have diabetes are at higher risk of heart disease. So choose lean cuts of meat and nonfat or low-fat dairy products. Use olive or canola oil instead of butter or shortening when cooking. · Don't skip meals. Your blood sugar may drop too low if you skip meals and take insulin or certain medicines for diabetes. · Check with your doctor before you drink alcohol. Alcohol can cause your blood sugar to drop too low. Alcohol can also cause a bad reaction if you take certain diabetes medicines. Follow-up care is a key part of your treatment and safety. Be sure to make and go to all appointments, and call your doctor if you are having problems. It's also a good idea to know your test results and keep a list of the medicines you take. Where can you learn more? Go to http://www.canales.com/  Enter I147 in the search box to learn more about \"Learning About Diabetes Food Guidelines. \"  Current as of: December 20, 2019               Content Version: 12.6  © 1397-5440 [employer]. Care instructions adapted under license by [employer] (which disclaims liability or warranty for this information). If you have questions about a medical condition or this instruction, always ask your healthcare professional. [place] [address] any warranty or liability for your use of this information. Patient Education        Learning About Meal Planning for Diabetes  Why plan your meals? Meal planning can be a key part of managing diabetes.  Planning meals and snacks with the right balance of carbohydrate, protein, and fat can help you keep your blood sugar at the target level you set with your doctor. You don't have to eat special foods. You can eat what your family eats, including sweets once in a while. But you do have to pay attention to how often you eat and how much you eat of certain foods. You may want to work with a dietitian or a certified diabetes educator. He or she can give you tips and meal ideas and can answer your questions about meal planning. This health professional can also help you reach a healthy weight if that is one of your goals. What plan is right for you? Your dietitian or diabetes educator may suggest that you start with the plate format or carbohydrate counting. The plate format  The plate format is a simple way to help you manage how you eat. You plan meals by learning how much space each food should take on a plate. Using the plate format helps you spread carbohydrate throughout the day. It can make it easier to keep your blood sugar level within your target range. It also helps you see if you're eating healthy portion sizes. To use the plate format, you put non-starchy vegetables on half your plate. Add meat or meat substitutes on one-quarter of the plate. Put a grain or starchy vegetable (such as brown rice or a potato) on the final quarter of the plate. You can add a small piece of fruit and some low-fat or fat-free milk or yogurt, depending on your carbohydrate goal for each meal.  Here are some tips for using the plate format:  · Make sure that you are not using an oversized plate. A 9-inch plate is best. Many restaurants use larger plates. · Get used to using the plate format at home. Then you can use it when you eat out. · Write down your questions about using the plate format. Talk to your doctor, a dietitian, or a diabetes educator about your concerns. Carbohydrate counting  With carbohydrate counting, you plan meals based on the amount of carbohydrate in each food.  Carbohydrate raises blood sugar higher and more quickly than any other nutrient. It is found in desserts, breads and cereals, and fruit. It's also found in starchy vegetables such as potatoes and corn, grains such as rice and pasta, and milk and yogurt. Spreading carbohydrate throughout the day helps keep your blood sugar levels within your target range. Your daily amount depends on several things, including your weight, how active you are, which diabetes medicines you take, and what your goals are for your blood sugar levels. A registered dietitian or diabetes educator can help you plan how much carbohydrate to include in each meal and snack. A guideline for your daily amount of carbohydrate is:  · 45 to 60 grams at each meal. That's about the same as 3 to 4 carbohydrate servings. · 15 to 20 grams at each snack. That's about the same as 1 carbohydrate serving. The Nutrition Facts label on packaged foods tells you how much carbohydrate is in a serving of the food. First, look at the serving size on the food label. Is that the amount you eat in a serving? All of the nutrition information on a food label is based on that serving size. So if you eat more or less than that, you'll need to adjust the other numbers. Total carbohydrate is the next thing you need to look for on the label. If you count carbohydrate servings, one serving of carbohydrate is 15 grams. For foods that don't come with labels, such as fresh fruits and vegetables, you'll need a guide that lists carbohydrate in these foods. Ask your doctor, dietitian, or diabetes educator about books or other nutrition guides you can use. If you take insulin, you need to know how many grams of carbohydrate are in a meal. This lets you know how much rapid-acting insulin to take before you eat. If you use an insulin pump, you get a constant rate of insulin during the day. So the pump must be programmed at meals to give you extra insulin to cover the rise in blood sugar after meals.   When you know how much carbohydrate you will eat, you can take the right amount of insulin. Or, if you always use the same amount of insulin, you need to make sure that you eat the same amount of carbohydrate at meals. If you need more help to understand carbohydrate counting and food labels, ask your doctor, dietitian, or diabetes educator. How do you get started with meal planning? Here are some tips to get started:  · Plan your meals a week at a time. Don't forget to include snacks too. · Use cookbooks or online recipes to plan several main meals. Plan some quick meals for busy nights. You also can double some recipes that freeze well. Then you can save half for other busy nights when you don't have time to cook. · Make sure you have the ingredients you need for your recipes. If you're running low on basic items, put these items on your shopping list too. · List foods that you use to make breakfasts, lunches, and snacks. List plenty of fruits and vegetables. · Post this list on the refrigerator. Add to it as you think of more things you need. · Take the list to the store to do your weekly shopping. Follow-up care is a key part of your treatment and safety. Be sure to make and go to all appointments, and call your doctor if you are having problems. It's also a good idea to know your test results and keep a list of the medicines you take. Where can you learn more? Go to http://www.gray.com/  Enter N780 in the search box to learn more about \"Learning About Meal Planning for Diabetes. \"  Current as of: December 20, 2019               Content Version: 12.6  © 6284-0723 [employer]. Care instructions adapted under license by [employer] (which disclaims liability or warranty for this information).  If you have questions about a medical condition or this instruction, always ask your healthcare professional. [address] any warranty or liability for your use of this information. Patient Education        Counting Carbohydrates: Care Instructions  Your Care Instructions     You don't have to eat special foods when you have diabetes. You just have to be careful to eat healthy foods. Carbohydrates (carbs) raise blood sugar higher and quicker than any other nutrient. Carbs are found in desserts, breads and cereals, and fruit. They're also in starchy vegetables. These include potatoes, corn, and grains such as rice and pasta. Carbs are also in milk and yogurt. The more carbs you eat at one time, the higher your blood sugar will rise. Spreading carbs all through the day helps keep your blood sugar levels within your target range. Counting carbs is one of the best ways to keep your blood sugar under control. If you use insulin, counting carbs helps you match the right amount of insulin to the number of grams of carbs in a meal. Then you can change your diet and insulin dose as needed. Testing your blood sugar several times a day can help you learn how carbs affect your blood sugar. A registered dietitian or certified diabetes educator can help you plan meals and snacks. Follow-up care is a key part of your treatment and safety. Be sure to make and go to all appointments, and call your doctor if you are having problems. It's also a good idea to know your test results and keep a list of the medicines you take. How can you care for yourself at home? Know your daily amount of carbohydrates  Your daily amount depends on several things, such as your weight, how active you are, which diabetes medicines you take, and what your goals are for your blood sugar levels. A registered dietitian or certified diabetes educator can help you plan how many carbs to include in each meal and snack. For most adults, a guideline for the daily amount of carbs is:  · 45 to 60 grams at each meal. That's about the same as 3 to 4 carbohydrate servings. · 15 to 20 grams at each snack.  That's about the same as 1 carbohydrate serving. Count carbs  Counting carbs lets you know how much rapid-acting insulin to take before you eat. If you use an insulin pump, you get a constant rate of insulin during the day. So the pump must be programmed at meals. This gives you extra insulin to cover the rise in blood sugar after meals. If you take insulin:  · Learn your own insulin-to-carb ratio. You and your diabetes health professional will figure out the ratio. You can do this by testing your blood sugar after meals. For example, you may need a certain amount of insulin for every 15 grams of carbs. · Add up the carb grams in a meal. Then you can figure out how many units of insulin to take based on your insulin-to-carb ratio. · Exercise lowers blood sugar. You can use less insulin than you would if you were not doing exercise. Keep in mind that timing matters. If you exercise within 1 hour after a meal, your body may need less insulin for that meal than it would if you exercised 3 hours after the meal. Test your blood sugar to find out how exercise affects your need for insulin. If you do or don't take insulin:  · Look at labels on packaged foods. This can tell you how many carbs are in a serving. You can also use guides from the American Diabetes Association. · Be aware of portions, or serving sizes. If a package has two servings and you eat the whole package, you need to double the number of grams of carbohydrate listed for one serving. · Protein, fat, and fiber do not raise blood sugar as much as carbs do. If you eat a lot of these nutrients in a meal, your blood sugar will rise more slowly than it would otherwise. Eat from all food groups  · Eat at least three meals a day. · Plan meals to include food from all the food groups. The food groups include grains, fruits, dairy, proteins, and vegetables.   · Talk to your dietitian or diabetes educator about ways to add limited amounts of sweets into your meal plan.  · If you drink alcohol, talk to your doctor. It may not be recommended when you are taking certain diabetes medicines. Where can you learn more? Go to http://www.gray.com/  Enter G703 in the search box to learn more about \"Counting Carbohydrates: Care Instructions. \"  Current as of: December 20, 2019               Content Version: 12.6  © 8890-8112 [employer]. Care instructions adapted under license by [employer] (which disclaims liability or warranty for this information). If you have questions about a medical condition or this instruction, always ask your healthcare professional. [place] [address] any warranty or liability for your use of this information. Smoking Cessation Program: This is a free, phone/text/email based, smoking cessation program. The program is individualized to meet each patient's needs. To enroll use the link - [employer]. com/quit or text Sara Sour to 649 5551 from any smart phone. [address] [place] [address]  [phone]        Patient ID:  Quincy Yeung  [id number]  44 y.o.  1960    Admit Date: 2/13/2021    Discharge Date: 2/16/2021     Admitting Physician: Jasmeet San MD     Discharge Physician: Jasmeet San MD    Admission Diagnoses:   Chest pain [R07.9]    Discharge Diagnoses:    Active Problems:    Paroxysmal atrial fibrillation (HCC) (1/10/2020)      Hyperlipidemia, unspecified hyperlipidemia type (1/10/2020)      Tobacco use (1/10/2020)      Chronic obstructive pulmonary disease (HCC) ()      Type 2 diabetes mellitus with diabetic neuropathy ([facility] [address] (12/7/2020)      Chest pain (2/13/2021)      Coronary artery disease involving native coronary artery of native heart with unstable angina pectoris ([address] (2/14/2021)      S/P cardiac cath (2/15/2021)      Overview: 2/15/2021: s/p BOBBY/PCI to ostial LM        Discharge Condition: Good    Cardiology Procedures this Admission:  Left heart catheterization with PCI    Disposition: home    Reference discharge instructions provided by nursing for diet and activity. Signed:  Kasandra Lewis NP  2/16/2021  2:16 PM        Radial Cardiac Catheterization/Angiography Discharge Instructions    It is normal to feel tired the first couple days. Take it easy and follow the physicians instructions. CHECK THE CATHETER INSERTION SITE DAILY:    Remove the wrist dressing 24 hours after the procedure. You may shower 24 hours after the procedure. Wash with soap and water and pat dry. Gentle cleaning of the site with soap and water is sufficient, cover with a dry clean dressing or bandage. Do not apply creams or powders to the area. No soaking the wrist for 3 days. Leave the puncture site open to air after 24 hours post-procedure. CALL THE PHYSICIANS:     If the site becomes red, swollen or feels warm to the touch  If there is bleeding or drainage or if there is unusual pain at the radial site. If there is any minor oozing, you may apply a band-aid and remove after 12 hours. If the bleeding continues, hold pressure with the middle finger against the puncture site and the thumb against the back of the wrist,call 911 to be transported to the hospital.  DO NOT DRIVE YOURSELF, [employer]. ACTIVITY:   For the first 24 hours do not manipulate the wrist.  No lifting, pushing or pulling over 3-5 pounds with the affected wrist for 7 daysand no straining the insertion site. Do not life grocery bags or the garbage can, do not run the vacuum  or  for 7 days. Start with short walks as in the hospital and gradually increase as tolerated each day. It is recommended to walk 30 minutes 5-7 days per week. Follow your physicians instructions on activity. Avoid walking outside in extremes of heat or cold. Walk inside when it is cold and windy or hot and humid.      Things to keep in mind:  No driving for at least 24 hours, or as designated by your physician. Limit the number of times you go up and down the stairs  Take rests and pace yourself with activity. Be careful and do not strain with bowel movements. MEDICATIONS:    Take all medications as prescribed  Call your physician if you have any questions  Keep an updated list of your medications with you at all times and give a list to your physician and pharmacist    SIGNS AND SYMPTOMS:   Be cautious of symptoms of angina or recurrent symptoms such as chest discomfort, unusual shortness of breath or fatigue. These could be symptoms of restenosis, a new blockage or a heart attack. If your symptoms are relieved with rest it is still recommended that you notify your physician of recurrent chest pain or discomfort. For CHEST PAIN or symptoms of angina not relieved with rest:  If the discomfort is not relieved with rest, and you have been prescribed Nitroglycerin, take as directed (taken under the tongue, one at a time 5 minutes apart for a total of 3 doses). If the discomfort is not relieved after the 3rd nitroglycerin, call 911. If you have not been prescribed Nitroglycerin  and your chest discomfort is not relieved with rest, call 911. AFTER CARE:   Follow up with your physician as instructed. Follow a heart healthy diet with proper portion control, daily stress management, daily exercise, blood pressure and cholesterol control , and smoking cessation.             HOSPITALIST DISCHARGE INSTRUCTIONS    NAME: Edelmira Cordero   :  1960   MRN:  [id number]     Date/Time:  2021 9:03 AM    ADMIT DATE: 2021   DISCHARGE DATE: 2021     Attending Physician: Anahi Esparza NP    DISCHARGE DIAGNOSIS:    Chest pain  CAD declined CABG in 2018  PAF  HTN  Dyslipidemia   Mildly elevated troponin  PAD s/p remote SFA angioplasty  Asymptomatic hyponatremia, resolved  Renal insuffiencey likely NOEMY, resolved  Acute anemia, resolved COPD  Tobacco abuse  Poorly controlled DM II  Hyperglycemia   Neuropathy     Medications: Per above medication reconciliation. Pain Management: per above medications    Recommended diet: Cardiac Diet and Diabetic Diet    Recommended activity: See surgical instructions    Wound care: See surgical/procedure care instructions    Indwelling devices:  None    Supplemental Oxygen: None    Required Lab work: per PCP    Glucose management:  per routine, keep diary to take to PCP appt    Code status: Full        Outside physician follow up: Follow-up Information     Follow up With Specialties Details Why Contact Info    Grey Ritter MD Cardiology[facility] [address] [facility] On 3/1/2021 For hospital follow up; 2:15 pm, arrive at 2 pm  [address]  [address] [facility] Corie Ko MD Internal Medicine On 2/26/2021 1:15pm follow up with PCP [address] [facility] [phone]            [facility] to avoid frequent ED visits. [facility] can treat; pains, sprains, cuts, wounds, high fevers, upper respiratory infections and much more. There medical team is equipped with all the tools necessary to provide advanced medical care in the comfort of you home, workplace, or location of need. The medical team consists of doctors, nurse practitioners, and EMTs. [facility] is available 7 days per week 9 am to 9 pm.   Request care by calling [phone] or by going online at [employer]      Information obtained by :  I understand that if any problems occur once I am at home I am to contact my physician. I understand and acknowledge receipt of the instructions indicated above.                                                                                                                                            Physician's or R.N.'s Signature Date/Time                                                                                                                                              Patient or Repres

## 2021-02-16 NOTE — ROUTINE PROCESS
Discharge instructions reviewed with patient. IV removed. Site CDI. Patient discharged via wheelchair to care of [facility] service for transportation.

## 2021-02-16 NOTE — ROUTINE PROCESS
Patient has been reluctant to follow nursing orders. Did not complete bedrest post-cath and was disconnecting lines. Patient has been hydrating very well so fluid intake is appropriate. Patient was educated by the diabetic APN on diet management at home and patient will follow-up with PCP as well.

## 2021-02-17 ENCOUNTER — PATIENT OUTREACH (OUTPATIENT)
Dept: CASE MANAGEMENT | Age: 61
End: 2021-02-17

## 2021-02-17 DIAGNOSIS — E11.9 CONTROLLED TYPE 2 DIABETES MELLITUS WITHOUT COMPLICATION, WITHOUT LONG-TERM CURRENT USE OF INSULIN (HCC): ICD-10-CM

## 2021-02-17 NOTE — PROGRESS NOTES
Pt.reports not feeling much better, reports  chest palpitations, and nausea. CTN advise pt.to return to ED for eval.or call 911. Pt.reports not want to go back to hospital. Patient begin discussing the expected weather etc.no other reports of chest discomfort, CTN suggest pt.call 911 recurring CP or palpitations. CTN will follow up in 1 day. [facility]    Patient contacted regarding recent discharge and COVID-19 risk. Discussed COVID-19 related testing which was available at this time. Test results were negative. Patient informed of results, if available? yes    Outreach made within 2 business days of discharge: Yes    Care Transition Nurse/ Ambulatory Care Manager/ LPN Care Coordinator contacted the patient by telephone to perform post discharge assessment. Verified name and  with patient as identifiers. Patient has following risk factors of: COPD and diabetes. CTN/ACM/LPN reviewed discharge instructions, medical action plan and red flags related to discharge diagnosis. Reviewed and educated them on any new and changed medications related to discharge diagnosis. Advised obtaining a 90-day supply of all daily and as-needed medications. START taking these medications     Details   clopidogreL (PLAVIX) 75 mg tab Take 1 Tab by mouth daily. Qty: 30 Tab, Refills: 3       nicotine (NICODERM CQ) 21 mg/24 hr 1 Patch by TransDERmal route daily for 30 days. Qty: 30 Patch, Refills: 0       insulin glargine (LANTUS,BASAGLAR) 100 unit/mL (3 mL) inpn 4 Units by SubCUTAneous route nightly for 30 days. Qty: 1 Pen, Refills: 0       Insulin Needles, Disposable, (Lite Touch Insulin Pen Needles) 31 gauge x 1/4\" ndle 30 Pen Needle by Does Not Apply route nightly.   Qty: 30 Pen Needle, Refills: 0         FOLLOW UPS:  Natasha Wiggins MD Cardiology, [address] [facility] Vascular Surgery On 3/1/2021  [phone] For hospital follow up; 2:15 pm, arrive at 2 pm      Nara Porras MD Internal Medicine  [phone] On 2021 1:15pm follow up with PCP     Fernandez Romero MD 0/83/30 @ 2:30 pm   [phone]    Advance Care Planning:   Does patient have an Advance Directive: [address] on file

## 2021-02-18 ENCOUNTER — TELEPHONE (OUTPATIENT)
Dept: CARDIOLOGY CLINIC | Age: 61
End: 2021-02-18

## 2021-02-18 RX ORDER — METFORMIN HYDROCHLORIDE 1000 MG/1
TABLET ORAL
Qty: 180 TAB | Refills: 3 | Status: SHIPPED | OUTPATIENT
Start: 2021-02-18 | End: 2022-02-18

## 2021-02-18 NOTE — TELEPHONE ENCOUNTER
Please call patient back he is in need for a Blood glucose meter and strips was not able to call primary doctor. Thanks.

## 2021-02-19 ENCOUNTER — TELEPHONE (OUTPATIENT)
Dept: INTERNAL MEDICINE CLINIC | Age: 61
End: 2021-02-19

## 2021-02-19 DIAGNOSIS — E11.9 CONTROLLED TYPE 2 DIABETES MELLITUS WITHOUT COMPLICATION, WITHOUT LONG-TERM CURRENT USE OF INSULIN (HCC): Primary | ICD-10-CM

## 2021-02-19 RX ORDER — LANCETS
EACH MISCELLANEOUS
Qty: 100 EACH | Refills: 11 | Status: SHIPPED | OUTPATIENT
Start: 2021-02-19 | End: 2021-10-05 | Stop reason: SDUPTHER

## 2021-02-19 RX ORDER — INSULIN PUMP SYRINGE, 3 ML
EACH MISCELLANEOUS
Qty: 1 KIT | Refills: 0 | Status: SHIPPED
Start: 2021-02-19 | End: 2021-11-05

## 2021-02-19 RX ORDER — ISOPROPYL ALCOHOL 70 ML/100ML
SWAB TOPICAL
Qty: 100 PAD | Refills: 11 | Status: SHIPPED | OUTPATIENT
Start: 2021-02-19 | End: 2022-03-29 | Stop reason: SDUPTHER

## 2021-02-19 NOTE — TELEPHONE ENCOUNTER
----- Message from Saloni Flores sent at 2/18/2021  9:56 AM EST -----  Regarding: MD Douglas/Refill  Medication Refill    Caller (if not patient): Self. Relationship of caller (if not patient): NA      Best contact number(s): [phone]      Name of medication and dosage if known: Glucometer Test Strips      Is patient out of this medication (yes/no): Yes      Pharmacy name: [address] [place] [address] listed in chart? (yes/no): Yes    Pharmacy phone number: [phone]      Details to clarify the request: Pt requesting Glucometer Test Strips, states he has called many times and was told an urgent message was sent yesterday.       Saloni Flores

## 2021-02-19 NOTE — TELEPHONE ENCOUNTER
Return call made to patient- Patient stated that hospital discharge instructions stated for him to check blood sugar daily and follow up with PCP. I checked chart and no glucose  monitor rx has been sent in from hospital provider. Patient stated that he has been trying to get in touch with hospital provider to send in rx with no luck. Patient asking if PCP can send in rx for glucose monitor. JERSEY scheduled for 5/57/86 with Dr. Sarita Jorge.

## 2021-02-19 NOTE — TELEPHONE ENCOUNTER
Spoke with patient. Verified patient with two patient identifiers. Advised PCP fills his DM supplies and has already sent to pharmacy. We do not fill these. Patient verbalized understanding.

## 2021-02-19 NOTE — TELEPHONE ENCOUNTER
----- Message from [place] sent at 2/17/2021  5:55 PM EST -----  Regarding: Dr. Maribel Segovia, telephone  Contact: [phone]  General Message/Vendor Calls    Caller's first and last name: n/a      Reason for call:  Checking on status of glucometer      Callback required yes/no and why:  Yes, pt request       Best contact number(s): [phone]      Details to clarify the request:  Pt calls at approximately 5:35 p.m. wondering why his glucometer hadn't been called in. Pt was recently discharged from the hospital.  Part of his discharge instructions were to start checking his blood sugars on a regular basis. He called earlier today and left a message that a glucometer was never called in for him and per his pharmacy, it still hasn't been called in. Pt requested that the on-call doctor be paged. However, the on-call doctors will not take calls for prescriptions, and this was explained to the patient. He is upset and understandably frustrated. He wants the office to know that he is considering switching PCPs because he has never had any luck getting in touch with Dr. Maribel Segovia when he needs to and that this is an ongoing problem. Please reach out to pt first thing in the morning and call his glucometer into the  [facility] right away.          His local pharmacy is:     [facility] [address]  [phone]      Thanks,   Gokul

## 2021-02-19 NOTE — TELEPHONE ENCOUNTER
----- Message from Melani Faustin sent at 2/19/2021  9:22 AM EST -----  Regarding: /telephone  Contact: [phone]  General Message/Vendor Calls    Caller's first and last name:N/A      Reason for call: He has been calling to get a new glucometer machine and test strips. He is very upset that he has had no response. He would like the rx done today, and he would like to speak a nurse.        Callback required yes/no and why: Yes      Best contact number(s):[phone]        Details to clarify the request:N/A      Melani Faustin

## 2021-02-19 NOTE — TELEPHONE ENCOUNTER
Return call made to patient to advise that rx has been sent to pharmacy on file. Patient was thankful for call back.

## 2021-02-23 ENCOUNTER — TELEPHONE (OUTPATIENT)
Dept: INTERNAL MEDICINE CLINIC | Age: 61
End: 2021-02-23

## 2021-02-23 ENCOUNTER — OFFICE VISIT (OUTPATIENT)
Dept: INTERNAL MEDICINE CLINIC | Age: 61
End: 2021-02-23
Payer: MEDICAID

## 2021-02-23 VITALS
DIASTOLIC BLOOD PRESSURE: 81 MMHG | SYSTOLIC BLOOD PRESSURE: 134 MMHG | OXYGEN SATURATION: 97 % | WEIGHT: 175 LBS | HEIGHT: 69 IN | TEMPERATURE: 98.5 F | BODY MASS INDEX: 25.92 KG/M2 | RESPIRATION RATE: 20 BRPM | HEART RATE: 90 BPM

## 2021-02-23 DIAGNOSIS — I48.0 PAROXYSMAL ATRIAL FIBRILLATION (HCC): ICD-10-CM

## 2021-02-23 DIAGNOSIS — M54.50 ACUTE LEFT-SIDED LOW BACK PAIN WITHOUT SCIATICA: ICD-10-CM

## 2021-02-23 DIAGNOSIS — Z09 HOSPITAL DISCHARGE FOLLOW-UP: ICD-10-CM

## 2021-02-23 DIAGNOSIS — E11.40 TYPE 2 DIABETES MELLITUS WITH DIABETIC NEUROPATHY, WITHOUT LONG-TERM CURRENT USE OF INSULIN (HCC): ICD-10-CM

## 2021-02-23 DIAGNOSIS — L97.521 SKIN ULCER OF LEFT FOOT, LIMITED TO BREAKDOWN OF SKIN (HCC): ICD-10-CM

## 2021-02-23 DIAGNOSIS — I25.10 CORONARY ARTERY DISEASE INVOLVING NATIVE CORONARY ARTERY OF NATIVE HEART WITHOUT ANGINA PECTORIS: Primary | ICD-10-CM

## 2021-02-23 DIAGNOSIS — R11.0 NAUSEA: ICD-10-CM

## 2021-02-23 PROBLEM — Z79.4 TYPE 2 DIABETES MELLITUS WITHOUT COMPLICATION, WITH LONG-TERM CURRENT USE OF INSULIN (HCC): Status: ACTIVE | Noted: 2020-01-10

## 2021-02-23 PROCEDURE — 1111F DSCHRG MED/CURRENT MED MERGE: CPT | Performed by: INTERNAL MEDICINE

## 2021-02-23 PROCEDURE — 99214 OFFICE O/P EST MOD 30 MIN: CPT | Performed by: INTERNAL MEDICINE

## 2021-02-23 RX ORDER — FLASH GLUCOSE SENSOR
KIT MISCELLANEOUS
Qty: 1 KIT | Refills: 5 | Status: SHIPPED | OUTPATIENT
Start: 2021-02-23 | End: 2021-03-26

## 2021-02-23 RX ORDER — CARVEDILOL 25 MG/1
25 TABLET ORAL 2 TIMES DAILY WITH MEALS
Qty: 60 TAB | Refills: 1 | Status: SHIPPED | OUTPATIENT
Start: 2021-02-23 | End: 2021-05-03

## 2021-02-23 RX ORDER — FLASH GLUCOSE SCANNING READER
EACH MISCELLANEOUS
Qty: 1 EACH | Refills: 0 | Status: SHIPPED | OUTPATIENT
Start: 2021-02-23 | End: 2021-03-26

## 2021-02-23 NOTE — TELEPHONE ENCOUNTER
PA completed via covermymeds  (Key: [id number])   Rx #: [id number]   Farxiga 10MG tablets   Awaiting response

## 2021-02-23 NOTE — PATIENT INSTRUCTIONS
For your diabetes, continue the metformin and insulin.  Continue taking glipizide until you can start the new medicine  dapagliflozin.   Stop taking glipizide when you start the new medicine.  
Low Back Pain: Exercises 
Introduction 
Here are some examples of exercises for you to try. The exercises may be suggested for a condition or for rehabilitation. Start each exercise slowly. Ease off the exercises if you start to have pain. 
You will be told when to start these exercises and which ones will work best for you. 
How to do the exercises 
Press-up  
1. Lie on your stomach, supporting your body with your forearms. 
2. Press your elbows down into the floor to raise your upper back. As you do this, relax your stomach muscles and allow your back to arch without using your back muscles. As your press up, do not let your hips or pelvis come off the floor. 
3. Hold for 15 to 30 seconds, then relax. 
4. Repeat 2 to 4 times.   
Alternate arm and leg (bird dog) exercise  
Do this exercise slowly. Try to keep your body straight at all times, and do not let one hip drop lower than the other. 
1. Start on the floor, on your hands and knees. 
2. Tighten your belly muscles. 
3. Raise one leg off the floor, and hold it straight out behind you. Be careful not to let your hip drop down, because that will twist your trunk. 
4. Hold for about 6 seconds, then lower your leg and switch to the other leg. 
5. Repeat 8 to 12 times on each leg. 
6. Over time, work up to holding for 10 to 30 seconds each time. 
7. If you feel stable and secure with your leg raised, try raising the opposite arm straight out in front of you at the same time.   
Knee-to-chest exercise  
1. Lie on your back with your knees bent and your feet flat on the floor. 
2. Bring one knee to your chest, keeping the other foot flat on the floor (or keeping the other leg straight, whichever feels better on your lower back). 
 3. Keep your lower back pressed to the floor. Hold for at least 15 to 30 seconds. 4. Relax, and lower the knee to the starting position. 5. Repeat with the other leg. Repeat 2 to 4 times with each leg. 6. To get more stretch, put your other leg flat on the floor while pulling your knee to your chest.   
Curl-ups 1. Lie on the floor on your back with your knees bent at a 90-degree angle. Your feet should be flat on the floor, about 12 inches from your buttocks. 2. Cross your arms over your chest. If this bothers your neck, try putting your hands behind your neck (not your head), with your elbows spread apart. 3. Slowly tighten your belly muscles and raise your shoulder blades off the floor. 4. Keep your head in line with your body, and do not press your chin to your chest. 
5. Hold this position for 1 or 2 seconds, then slowly lower yourself back down to the floor. 6. Repeat 8 to 12 times. Pelvic tilt exercise 1. Lie on your back with your knees bent. 2. \"Brace\" your stomach. This means to tighten your muscles by pulling in and imagining your belly button moving toward your spine. You should feel like your back is pressing to the floor and your hips and pelvis are rocking back. 3. Hold for about 6 seconds while you breathe smoothly. 4. Repeat 8 to 12 times. Heel dig bridging 1. Lie on your back with both knees bent and your ankles bent so that only your heels are digging into the floor. Your knees should be bent about 90 degrees. 2. Then push your heels into the floor, squeeze your buttocks, and lift your hips off the floor until your shoulders, hips, and knees are all in a straight line. 3. Hold for about 6 seconds as you continue to breathe normally, and then slowly lower your hips back down to the floor and rest for up to 10 seconds. 4. Do 8 to 12 repetitions. Hamstring stretch in doorway 1. Lie on your back in a doorway, with one leg through the open door. 2. Slide your leg up the wall to straighten your knee. You should feel a gentle stretch down the back of your leg. 3. Hold the stretch for at least 15 to 30 seconds. Do not arch your back, point your toes, or bend either knee. Keep one heel touching the floor and the other heel touching the wall. 4. Repeat with your other leg. 5. Do 2 to 4 times for each leg. Hip flexor stretch 1. Kneel on the floor with one knee bent and one leg behind you. Place your forward knee over your foot. Keep your other knee touching the floor. 2. Slowly push your hips forward until you feel a stretch in the upper thigh of your rear leg. 3. Hold the stretch for at least 15 to 30 seconds. Repeat with your other leg. 4. Do 2 to 4 times on each side. Wall sit 1. Stand with your back 10 to 12 inches away from a wall. 2. Lean into the wall until your back is flat against it. 3. Slowly slide down until your knees are slightly bent, pressing your lower back into the wall. 4. Hold for about 6 seconds, then slide back up the wall. 5. Repeat 8 to 12 times. Follow-up care is a key part of your treatment and safety. Be sure to make and go to all appointments, and call your doctor if you are having problems. It's also a good idea to know your test results and keep a list of the medicines you take. Where can you learn more? Go to http://www.gray.com/ Enter Z752 in the search box to learn more about \"Low Back Pain: Exercises. \" Current as of: March 2, 2020               Content Version: 12.6 © 3537-5172 [employer]. Care instructions adapted under license by [employer] (which disclaims liability or warranty for this information). If you have questions about a medical condition or this instruction, always ask your healthcare professional. [address] any warranty or liability for your use of this information.

## 2021-02-23 NOTE — PROGRESS NOTES
Assessment and Plan   Diagnoses and all orders for this visit:    History DM2, CAD s/p BOBBY to ostial LM, afib, PAD s/p L SFA angioplasty (Dr. Benjamin Oconnor), DM2, HTN, HLD, tobacco use      1. Coronary artery disease involving native coronary artery of native heart without angina pectoris  -     REFERRAL TO PHYSICAL THERAPY  2. Paroxysmal atrial fibrillation (HCC)  -     carvediloL (COREG) 25 mg tablet; Take 1 Tab by mouth two (2) times daily (with meals). Indications: atrial fibrillation  Admitted 2/132/16 after presenting with shortness of breath and palpitations associated with chest pain. He had a cardiac catheterization with BOBBY to ostial LM. Of note, he was previously offered a CABG in 2018 but he declined at that time. Echo with normal EF. Patient reports that since his discharge, his chest pain has resolved but he still continues to have some dizziness and shortness of breath with exertion that actually are worse than when he was discharged. Also reports persistent palpitations. He has not missed any of his medications. He feels the symptoms may be related to decreased activity since coming from the hospital.    Orthostatics are negative. Cardiac exam and lung exam normal today. Patient was feeling palpitations when I was listening but rate and rhythm were normal.  Recommend increasing Coreg to 25 mg twice a day. If symptoms persistent, consider further work-up. Referral for cardiac rehab provided    On triple therapy, atorvastatin, lisinopril, Coreg    3. Type 2 diabetes mellitus with diabetic neuropathy, without long-term current use of insulin ([facility])  -     dapagliflozin (FARXIGA) 10 mg tab tablet; Take 1 Tab by mouth daily.  Indications: type 2 diabetes mellitus  -     REFERRAL TO DIETITIAN  -     flash glucose scanning reader (FreeStyle Olivia 14 Day [place]) misc; Use to check glucose  -     flash glucose sensor (FreeStyle Olivia 14 Day Sensor) kit; Use to check glucose  Hemoglobin A1c done during admission was 8.5 which is increased from 7.6. He was continued on Metformin and glipizide and also started on Lantus 4 units. He has not been able to get a glucose monitor yet. Given his cardiac disease, recommend stopping glipizide and starting SGLT2. Reports that glipizide worked really well for him in the past.  Could consider glimepiride in the future which is not associated with increased mortality risk in patients with heart disease. Continue Metformin. Continue insulin for now. He is interested in seeing if his insurance will cover the freestyle iona. 4. Nausea  Reports about a 2-week history of nausea after eating in the evening. Reports that he was having these symptoms prior to his hospitalization and after his hospitalization but he did not have these problems when he was in the hospital.  Reports that his portion sizes are smaller when he was in the hospital.    Unclear etiology. Recommend trying smaller but more frequent meals to see if that helps. 5. Acute left-sided low back pain without sciatica  Reports about a 2-week history of left lower back pain. Has been doing a lot of work in his house. Denies any urinary symptoms. Likely MSK related. Was given exercises that he could try at home. Could also try lidocaine patches. Benefits, risks, possible drug interactions, and side effects of all new medications were reviewed with the patient. Pt verbalized understanding. Return to clinic: 1 month for diabetes    An electronic signature was used to authenticate this note.   Hayden Castro MD  [facility]  2/23/2021    Future Appointments   Date Time Provider Ximena Varghese   3/1/2021  2:15 PM Abbie Zaragoza MD [facility] BS AMB        History of Present [address] follow-up    Parag Alcaraz is a 62 y.o. male     FROM CHART REVIEW  Admit date: 2/13/2021  Discharge date and time: 2/16/2021    presented to [facility] for evaluation of SOB and palpitations.  Symptoms worst this morning where he felt his heart was coming out of his chest followed by chest discomfort.  Symptom onset was several days ago associated with palpitations.  Patient has history of CAD and was evaluated at [facility] in 2018 and was offered CABG.  He declined CABG at the time and lost to follow-up with any cardiologist.  He follows closely with his PCP.     Patient was subsequently transferred to [address] [place] [address] for further evaluation. CXR 02/13/21:  Minimal diffuse interstitial prominence may represent minimal edema  or atypical viral pneumonia. Echo 02/13/21:  · LV: Estimated LVEF is 55 - 60%. Visually measured ejection fraction. Normal cavity size, wall thickness and systolic function (ejection fraction normal). · AV: Probably trileaflet aortic valve. · RV: Not well visualized. - S/p Cardiac Cath 2/15 with PCI/BOBBY to ostial LM  - Appreciate Cardiology input   - Continue asa 81 mg po daily for on month at discharge   - Continue carvedilol 12.5 mg po bid. - Continue atorvastatin 40 mg po daily.  - Resume lisinopril  - Continue apixaban and plavix   -Patient already has follow up with PCP and Cardiology follow up appointments   Triple therapy x 1 month then stop asa 3/17/21    Asymptomatic hyponatremia, resolved   Renal insuffiencey likely NOEMY, resolved  - Resume Lisinopril    Acute anemia, resolved   - Patient had initial drop in H/H after admission but stable since.   - Unable to obtain hemoccult stool   COPD  Tobacco abuse  - Continue nicotine patch. - Smoking cessation reinforced. Poorly controlled DM II  Hyperglycemia   Neuropathy  HgbA1C 02/13/21:  8.5  - Resume oral diabetes meds. - Continue low dose basal (glargine) insulin, 4 units sc daily.   - Patient to keep glucose diary and take to PCP appt   - Continue gabapentin 200 mg qam and 600 mg po qhs. Review of Systems   Constitutional: Negative for chills and fever.    Respiratory: Positive for shortness of breath. Cardiovascular: Positive for palpitations. Negative for chest pain. Gastrointestinal: Positive for nausea. Negative for abdominal pain, blood in stool, constipation, diarrhea, melena and vomiting. Genitourinary: Negative for dysuria and hematuria. Musculoskeletal: Positive for back pain. Negative for joint pain. Neurological: Negative for headaches. Past Medical History     Allergies   Allergen Reactions    Penicillin G Unknown (comments)     When he was a young child Mother told him he was allergic        Current Outpatient Medications   Medication Sig    Blood-Glucose Meter monitoring kit Use 1-3 times daily to monitor sugar    glucose blood VI test strips (ASCENSIA AUTODISC VI, ONE TOUCH ULTRA TEST VI) strip Use 1-3 times daily to monitor sugar    lancets misc Use 1-3 times daily to monitor sugar    alcohol swabs padm Use 1-3 times daily to monitor sugar    metFORMIN (GLUCOPHAGE) 1,000 mg tablet TAKE ONE TABLET BY MOUTH TWICE A DAY WITH MEALS    clopidogreL (PLAVIX) 75 mg tab Take 1 Tab by mouth daily.  nicotine (NICODERM CQ) 21 mg/24 hr 1 Patch by TransDERmal route daily for 30 days.  insulin glargine (LANTUS,BASAGLAR) 100 unit/mL (3 mL) inpn 4 Units by SubCUTAneous route nightly for 30 days.  Insulin Needles, Disposable, (Lite Touch Insulin Pen Needles) 31 gauge x 1/4\" ndle 30 Pen Needle by Does Not Apply route nightly.  terbinafine HCL (LAMISIL) 250 mg tablet Take 250 mg by mouth daily.  glipiZIDE SR (GLUCOTROL XL) 10 mg CR tablet Take 1 Tab by mouth two (2) times a day. Indications: type 2 diabetes mellitus    gabapentin (NEURONTIN) 100 mg capsule Take 2 Caps by mouth Every morning. Max Daily Amount: 200 mg.    gabapentin (NEURONTIN) 300 mg capsule Take 2 Caps by mouth nightly. Max Daily Amount: 600 mg.  aspirin delayed-release 81 mg tablet Take 1 Tab by mouth daily.     atorvastatin (LIPITOR) 40 mg tablet Take 1 Tab by mouth daily. Indications: excessive fat in the blood    carvediloL (COREG) 12.5 mg tablet Take 1 Tab by mouth two (2) times daily (with meals). Indications: atrial fibrillation    lisinopriL (PRINIVIL, ZESTRIL) 10 mg tablet Take 1 Tab by mouth daily. Indications: high blood pressure    apixaban (Eliquis) 5 mg tablet Take 1 Tab by mouth two (2) times a day. Indications: treatment to prevent blood clots in chronic atrial fibrillation    multivitamin (ONE A DAY) tablet Take 1 Tab by mouth daily. No current facility-administered medications for this visit. Patient Active Problem List   Diagnosis Code    Controlled type 2 diabetes mellitus without complication, without long-term current use of insulin ([facility]) E11.9    Paroxysmal atrial fibrillation ([facility]) I48.0    Essential hypertension I10    Erectile dysfunction, unspecified erectile dysfunction type N52.9    Hyperlipidemia, unspecified hyperlipidemia type E78.5    Skin ulcer of left foot, limited to breakdown of skin ([facility] [address] L97.521    Tobacco use Z72.0    Bilateral hearing loss, unspecified hearing loss type H91.93    Easy bruising R23.8    Peripheral arterial disease ([facility]) I73.9    Chronic obstructive pulmonary disease ([facility]) J44.9    Type 2 diabetes mellitus with diabetic neuropathy ([facility]) E11.40    Type 2 diabetes mellitus with peripheral vascular disease ([facility]) E11.51    Coronary artery disease involving native coronary artery of native heart with unstable angina pectoris ([facility]) I25.110    S/P cardiac cath A55.418     Past Surgical History:   Procedure Laterality Date    HX ANGIOPLASTY      lt SFA angioplasty.     HX HEENT      Right ear bone removal    HX OTHER SURGICAL      Left vascular surgery, Clot removal?    HX VASCULAR STENT      VASCULAR SURGERY PROCEDURE UNLIST        Social History     Tobacco Use    Smoking status: Current Every Day Smoker     Packs/day: 1.50    Smokeless tobacco: Never Used    Tobacco comment: 1.5 pack Substance Use Topics    Alcohol use: Not Currently     Comment: quit 45 years       Family History   Problem Relation Age of Onset    Heart Disease Mother         triple bypass     Cancer Sister     Heart Disease Sister     Diabetes Brother     Cancer Sister         Physical Exam   Vitals:       Visit Vitals  /81   Pulse 90   Temp 98.5 °F (36.9 °C) (Oral)   Resp 20   Ht 5' 9\" (1.753 m)   Wt 175 lb (79.4 kg)   SpO2 97%   BMI 25.84 kg/m²        Physical Exam  Constitutional:       General: He is not in acute distress. Appearance: He is well-developed. HENT:      Right Ear: Tympanic membrane, ear canal and external ear normal.      Left Ear: Tympanic membrane, ear canal and external ear normal.   Eyes:      Extraocular Movements: Extraocular movements intact. Conjunctiva/sclera: Conjunctivae normal.   Neck:      Musculoskeletal: Neck supple. Cardiovascular:      Rate and Rhythm: Normal rate and regular rhythm. Pulses: Normal pulses. Heart sounds: No murmur. No friction rub. No gallop. Pulmonary:      Effort: No respiratory distress. Breath sounds: No wheezing, rhonchi or rales. Abdominal:      General: Bowel sounds are normal. There is no distension. Palpations: Abdomen is soft. There is no hepatomegaly, splenomegaly or mass. Tenderness: There is no abdominal tenderness. There is no guarding. Musculoskeletal:      Comments: Mild left lower back paraspinal muscle tenderness. No spinal tenderness. Left-sided paraspinal muscles tight   Skin:     General: Skin is warm. Findings: No rash. Neurological:      Mental Status: He is alert.

## 2021-02-24 ENCOUNTER — PATIENT OUTREACH (OUTPATIENT)
Dept: CASE MANAGEMENT | Age: 61
End: 2021-02-24

## 2021-02-24 NOTE — PROGRESS NOTES
CTN unable to contact patient for COVID/[place] follow up, office visit with PCP, Dr. Riddhi Zamudio 3/80/26. CTN will follow up in 1 week, LVM. -[address]

## 2021-02-26 ENCOUNTER — TELEPHONE (OUTPATIENT)
Dept: CARDIOLOGY CLINIC | Age: 61
End: 2021-02-26

## 2021-02-26 NOTE — TELEPHONE ENCOUNTER
Please call patient wants to report episode he had last night. Woke up from dead sleep pounding heart rate and shortness of breath and nausea but feels fine now. He reported this to his pcp and they increased his carvedilol from 12.5 mg to 25 mg 2x times a day. [phone]    Patient has an appointment on Monday 3/1/21.     Thanks  Will Humphries

## 2021-02-26 NOTE — TELEPHONE ENCOUNTER
Spoke with patient. Verified with two patient identifiers. Spoke with pt he states that he is not having any symptoms now. States that per Manuelito Núñez NP there is nothing else that she would do at this time and that we will see him on Monday for his appt. Patient verbalized understanding.

## 2021-03-01 ENCOUNTER — OFFICE VISIT (OUTPATIENT)
Dept: CARDIOLOGY CLINIC | Age: 61
End: 2021-03-01

## 2021-03-01 ENCOUNTER — TELEPHONE (OUTPATIENT)
Dept: INTERNAL MEDICINE CLINIC | Age: 61
End: 2021-03-01

## 2021-03-01 ENCOUNTER — CLINICAL SUPPORT (OUTPATIENT)
Dept: CARDIOLOGY CLINIC | Age: 61
End: 2021-03-01
Payer: MEDICAID

## 2021-03-01 VITALS
HEIGHT: 69 IN | RESPIRATION RATE: 16 BRPM | OXYGEN SATURATION: 98 % | SYSTOLIC BLOOD PRESSURE: 120 MMHG | BODY MASS INDEX: 26.87 KG/M2 | HEART RATE: 90 BPM | WEIGHT: 181.4 LBS | DIASTOLIC BLOOD PRESSURE: 64 MMHG

## 2021-03-01 DIAGNOSIS — I25.110 CORONARY ARTERY DISEASE INVOLVING NATIVE CORONARY ARTERY OF NATIVE HEART WITH UNSTABLE ANGINA PECTORIS (HCC): Primary | ICD-10-CM

## 2021-03-01 DIAGNOSIS — I48.0 PAROXYSMAL ATRIAL FIBRILLATION (HCC): Primary | ICD-10-CM

## 2021-03-01 DIAGNOSIS — Z98.890 S/P CARDIAC CATH: ICD-10-CM

## 2021-03-01 DIAGNOSIS — I48.0 PAROXYSMAL ATRIAL FIBRILLATION (HCC): ICD-10-CM

## 2021-03-01 DIAGNOSIS — R00.2 PALPITATIONS: ICD-10-CM

## 2021-03-01 DIAGNOSIS — E78.5 HYPERLIPIDEMIA, UNSPECIFIED HYPERLIPIDEMIA TYPE: ICD-10-CM

## 2021-03-01 DIAGNOSIS — Z72.0 TOBACCO USE: ICD-10-CM

## 2021-03-01 DIAGNOSIS — I10 ESSENTIAL HYPERTENSION: ICD-10-CM

## 2021-03-01 PROCEDURE — 99214 OFFICE O/P EST MOD 30 MIN: CPT | Performed by: INTERNAL MEDICINE

## 2021-03-01 PROCEDURE — 93000 ELECTROCARDIOGRAM COMPLETE: CPT | Performed by: INTERNAL MEDICINE

## 2021-03-01 PROCEDURE — 93272 ECG/REVIEW INTERPRET ONLY: CPT | Performed by: INTERNAL MEDICINE

## 2021-03-01 PROCEDURE — 1111F DSCHRG MED/CURRENT MED MERGE: CPT | Performed by: INTERNAL MEDICINE

## 2021-03-01 PROCEDURE — 93270 REMOTE 30 DAY ECG REV/REPORT: CPT | Performed by: INTERNAL MEDICINE

## 2021-03-01 RX ORDER — TADALAFIL 20 MG/1
20 TABLET ORAL DAILY PRN
COMMUNITY
End: 2021-07-06

## 2021-03-01 NOTE — TELEPHONE ENCOUNTER
[place] approved for Farxiga 10 mg approved for 3/1/21-3/1/22. Patient and pharmacy informed. Patient asked if provider would like him to stop the metformin when he gets the new medication?

## 2021-03-01 NOTE — TELEPHONE ENCOUNTER
----- Message from Litzy Kelly sent at 3/1/2021  1:15 PM EST -----  Regarding: Dr. Fabby Mejia: [phone]  General Message/Vendor Calls    Caller's first and last name: Kelly [employer]. Reason for call: Status of prior authorization for \"Farxiga\" 10 mg. Callback required yes/no and why: Yes, confirm fax was received. Best contact number(s): [phone]      Details to clarify the request: Issue with online portal, please do not use, call directly. Reference key for call back: [id number] Kelly

## 2021-03-01 NOTE — LETTER
3/1/2021 Patient: Werner Gonzalez YOB: 1960 Date of Visit: 3/1/2021 Arline Rahman MD 
[address] [facility] [address] [address] [employer] Dear Arline Rahman MD, Thank you for referring Mr. Werner Gonzalez to [address] [place], [place] [address]ace for evaluation. My notes for this consultation are attached. If you have questions, please do not hesitate to call me. I look forward to following your patient along with you.  
 
 
Sincerely, 
 
Yo Ordonez MD

## 2021-03-01 NOTE — TELEPHONE ENCOUNTER
[employer] called to advise nurse of updated insurance information. Jaclyn advised patient currently has a [employer] plan, will fax updated information w/ prior auth request on 2 medications (Flash Freestyle Olivia 14 Day [place] , & Flash Freestyle Olivia 14 Day Sensor). Please provide updated insurance information to  PSR to update patient's chart once obtained by nurse.      Farxiga 10 MG Tab - 1 tab daily (approved without prior auth needed)

## 2021-03-01 NOTE — TELEPHONE ENCOUNTER
----- Message from Janice Simon sent at 3/1/2021  3:36 PM EST -----  [facility] [phone]  Patient return call    Caller's first and last name and relationship (if not the patient): Pt. Best contact number(s): [phone]       Whose call is being returned: Unknown. Details to clarify the request: Missed call.        Janice Simon

## 2021-03-01 NOTE — PROGRESS NOTES
Subjective/HPI:     Janice Benavides is a 62 y.o. male is here for a hospital f/u appt. PMHx significant for CAD, type 2 diabetes with neuropathy, hypertension, hyperlipidemia, history of A. fib, active tobacco use. 2/13/21 He presented to [facility] for evaluation of SOB and palpitations. He was transferred to [facility], ASA and SL NTG relieved CP. He had an echo with preserved EF and no valvular disease on 2/13. Cardiac cath on 2/15 with 80% stenosis Mazin Gunn, +FFR with PCI/BOBBY. Since d/c he denies chest pain. He had an episode on the 26th where he woke up feeling his heart pounding out of his chest, couldn't breathe. His PCP increased his dose of Coreg. Since then he has an awareness of his heart beat, but no further episodes of heart pounding. He is not sleeping, stressed d/t missing his family in [place], he and his wife are getting a divorce. He denies orthopnea, PND, LE edema, syncope, or presyncope. He is bruising and bleeding very easily. PCP Provider  Jayy Geller MD  Past Medical History:   Diagnosis Date    Chronic obstructive pulmonary disease ([facility] [address]       Past Surgical History:   Procedure Laterality Date    HX ANGIOPLASTY      lt SFA angioplasty.  HX CORONARY STENT PLACEMENT  02/15/2021    BOBBY/PCI to Ostial LM POD     HX HEART CATHETERIZATION  02/15/2021    HX HEENT      Right ear bone removal    HX OTHER SURGICAL      Left vascular surgery, Clot removal?    HX VASCULAR STENT      VASCULAR SURGERY PROCEDURE UNLIST       Allergies   Allergen Reactions    Penicillin G Unknown (comments)     When he was a young child Mother told him he was allergic      Family History   Problem Relation Age of Onset    Heart Disease Mother         triple bypass     Cancer Sister     Heart Disease Sister     Diabetes Brother     Cancer Sister       Current Outpatient Medications   Medication Sig    tadalafiL (CIALIS) 20 mg tablet Take 20 mg by mouth daily as needed.     carvediloL (COREG) 25 mg tablet Take 1 Tab by mouth two (2) times daily (with meals). Indications: atrial fibrillation    lancets misc Use 1-3 times daily to monitor sugar    alcohol swabs padm Use 1-3 times daily to monitor sugar    metFORMIN (GLUCOPHAGE) 1,000 mg tablet TAKE ONE TABLET BY MOUTH TWICE A DAY WITH MEALS    clopidogreL (PLAVIX) 75 mg tab Take 1 Tab by mouth daily.  insulin glargine (LANTUS,BASAGLAR) 100 unit/mL (3 mL) inpn 4 Units by SubCUTAneous route nightly for 30 days.  Insulin Needles, Disposable, (Lite Touch Insulin Pen Needles) 31 gauge x 1/4\" ndle 30 Pen Needle by Does Not Apply route nightly.  terbinafine HCL (LAMISIL) 250 mg tablet Take 250 mg by mouth daily.  glipiZIDE SR (GLUCOTROL XL) 10 mg CR tablet Take 1 Tab by mouth two (2) times a day. Indications: type 2 diabetes mellitus    gabapentin (NEURONTIN) 100 mg capsule Take 2 Caps by mouth Every morning. Max Daily Amount: 200 mg.    gabapentin (NEURONTIN) 300 mg capsule Take 2 Caps by mouth nightly. Max Daily Amount: 600 mg.  aspirin delayed-release 81 mg tablet Take 1 Tab by mouth daily.  atorvastatin (LIPITOR) 40 mg tablet Take 1 Tab by mouth daily. Indications: excessive fat in the blood    lisinopriL (PRINIVIL, ZESTRIL) 10 mg tablet Take 1 Tab by mouth daily. Indications: high blood pressure    apixaban (Eliquis) 5 mg tablet Take 1 Tab by mouth two (2) times a day. Indications: treatment to prevent blood clots in chronic atrial fibrillation    multivitamin (ONE A DAY) tablet Take 1 Tab by mouth daily.  dapagliflozin (FARXIGA) 10 mg tab tablet Take 1 Tab by mouth daily.  Indications: type 2 diabetes mellitus (Patient not taking: Reported on 3/1/2021)    flash glucose scanning reader (FreeStyle Olivia 14 Day [place]) misc Use to check glucose (Patient not taking: Reported on 3/1/2021)    flash glucose sensor (FreeStyle Olivia 14 Day Sensor) kit Use to check glucose (Patient not taking: Reported on 3/1/2021)    Blood-Glucose Meter monitoring kit Use 1-3 times daily to monitor sugar (Patient not taking: Reported on 3/1/2021)    glucose blood VI test strips (ASCENSIA AUTODISC VI, ONE TOUCH ULTRA TEST VI) strip Use 1-3 times daily to monitor sugar    nicotine (NICODERM CQ) 21 mg/24 hr 1 Patch by TransDERmal route daily for 30 days. (Patient not taking: Reported on 3/1/2021)     No current facility-administered medications for this visit.        Vitals:    03/01/21 1426   BP: 120/64   Pulse: 90   Resp: 16   SpO2: 98%   Weight: 181 lb 6.4 oz (82.3 kg)   Height: 5' 9\" (1.753 m)     Social History     Socioeconomic History    Marital status:      Spouse name: Not on file    Number of children: Not on file    Years of education: Not on file    Highest education level: Not on file   Occupational History    Not on file   Social Needs    Financial resource strain: Not on file    Food insecurity     Worry: Not on file     Inability: Not on file   [employer] needs     Medical: Not on file     Non-medical: Not on file   Tobacco Use    Smoking status: Current Every Day Smoker     Packs/day: 1.50    Smokeless tobacco: Never Used    Tobacco comment: 1.5 pack    Substance and Sexual Activity    Alcohol use: Not Currently     Comment: quit 38 years     Drug use: Not Currently    Sexual activity: Yes     Partners: Female   Lifestyle    Physical activity     Days per week: Not on file     Minutes per session: Not on file    Stress: Not on file   Relationships    Social connections     Talks on phone: Not on file     Gets together: Not on file     Attends Scientologist service: Not on file     Active member of club or organization: Not on file     Attends meetings of clubs or organizations: Not on file     Relationship status: Not on file    Intimate partner violence     Fear of current or ex partner: Not on file     Emotionally abused: Not on file     Physically abused: Not on file     Forced sexual activity: Not on file   Other Topics Concern     Service Not Asked    Blood Transfusions Not Asked    Caffeine Concern Not Asked    Occupational Exposure Not Asked    Hobby Hazards Not Asked    Sleep Concern Not Asked    Stress Concern Not Asked    Weight Concern Not Asked    Special Diet Not Asked    Back Care Not Asked    Exercise Not Asked    Bike Helmet Not Asked   [address] Not Asked    Self-Exams Not Asked   Social History Narrative    ** Merged History Encounter **            I have reviewed the nurses notes, vitals, problem list, allergy list, medical history, family, social history and medications. Review of Symptoms:    General: Pt denies excessive weight gain or loss. Pt is able to conduct ADL's  HEENT: Denies blurred vision, headaches, epistaxis and difficulty swallowing. Respiratory: +episode of shortness of breath, denies COCHRAN, wheezing or stridor. Cardiovascular: Denies precordial pain, +palpitations, denies edema or PND  Gastrointestinal: Denies poor appetite, indigestion, abdominal pain or blood in stool  Urinary: Denies dysuria, pyuria  Musculoskeletal: Denies pain or swelling from muscles or joints  Neurologic: Denies tremor, paresthesias, or sensory motor disturbance  Skin: Denies rash, itching or texture change. Psych: Denies depression        Physical Exam:      General: Well developed, in no acute distress, cooperative and alert  HEENT: No carotid bruits, no JVD, trach is midline. Neck Supple, PEERL, EOM intact. Heart:  Normal S1/S2 negative S3 or S4. Regular, no murmur, gallop or rub. Respiratory: Clear bilaterally x 4, no wheezing or rales  Abdomen:   Soft, non-tender, no masses, bowel sounds are active. Extremities:  No edema, normal cap refill, no cyanosis, atraumatic. Neuro: A&Ox3, speech clear, gait stable. Skin: Skin color is normal. No rashes or lesions.  Non diaphoretic  Vascular: 2+ pulses symmetric in all extremities    Cardiographics    ECG: SR    2/13/21 echo  · LV: Estimated LVEF is 55 - 60%. Visually measured ejection fraction. Normal cavity size, wall thickness and systolic function (ejection fraction normal). · AV: Probably trileaflet aortic valve. · RV: Not well visualized. Cardiac cath 2/15/21  Left Main   Ost LM lesion, 80% stenosed. Pressure wire/iFR was perfromed on the lesion. Flow Reserve: 0.68. There is severe plaque burden detected. The lesion has a fibro-fatty core. Left Anterior Descending   The vessel was visualized by angiography. The vessel is angiographically normal.   Left Circumflex   The vessel was visualized by angiography. The vessel is angiographically normal.   Right Coronary Artery   The vessel was visualized by angiography.  The vessel is angiographically normal.       Results for orders placed or performed during the hospital encounter of 02/13/21   EKG, 12 LEAD, INITIAL   Result Value Ref Range    Ventricular Rate 91 BPM    Atrial Rate 91 BPM    P-R Interval 144 ms    QRS Duration 90 ms    Q-T Interval 354 ms    QTC Calculation (Bezet) 435 ms    Calculated P Axis 38 degrees    Calculated R Axis 9 degrees    Calculated T Axis 51 degrees    Diagnosis       Normal sinus rhythm  Possible Inferior infarct , age undetermined  Poor R-wave Progression (consider lead placement or loss of anterior forces)  Confirmed by Enoc Valenzuela MD, Jaelyn Villalta ([id number]) on 2/14/2021 3:52:41 PM           Cardiology Labs:  Lab Results   Component Value Date/Time    Cholesterol, total 86 02/14/2021 03:27 AM    HDL Cholesterol 26 02/14/2021 03:27 AM    LDL, calculated 5.6 02/14/2021 03:27 AM    LDL, calculated 49.6 01/10/2020 03:57 PM    VLDL, calculated 54.4 02/14/2021 03:27 AM    CHOL/HDL Ratio 3.3 02/14/2021 03:27 AM     Lab Results   Component Value Date/Time    Sodium 140 02/16/2021 04:19 AM    Potassium 3.8 02/16/2021 04:19 AM    Chloride 110 (H) 02/16/2021 04:19 AM    CO2 24 02/16/2021 04:19 AM    Glucose 217 (H) 02/16/2021 04:19 AM    BUN 13 02/16/2021 04:19 AM Creatinine 1.14 02/16/2021 04:19 AM    BUN/Creatinine ratio 11 (L) 02/16/2021 04:19 AM    GFR est AA >60 02/16/2021 04:19 AM    GFR est non-AA >60 02/16/2021 04:19 AM    Calcium 8.6 02/16/2021 04:19 AM    Anion gap 6 02/16/2021 04:19 AM    Bilirubin, total 0.3 02/13/2021 06:27 AM    ALT (SGPT) 18 02/13/2021 06:27 AM    Alk. phosphatase 65 02/13/2021 06:27 AM    Protein, total 6.8 02/13/2021 06:27 AM    Albumin 3.7 02/13/2021 06:27 AM    Globulin 3.1 02/13/2021 06:27 AM    A-G Ratio 1.2 02/13/2021 06:27 AM     Lab Results   Component Value Date/Time    Hemoglobin A1c 8.5 (H) 02/13/2021 11:39 AM        Assessment:     Assessment:     Diagnoses and all orders for this visit:    1. Coronary artery disease involving native coronary artery of native heart with unstable angina pectoris (HCC)  -     AMB POC EKG ROUTINE W/ 12 LEADS, INTER & REP  -     CT DISCHARGE MEDS RECONCILED W/ CURRENT OUTPATIENT MED LIST  -     CARDIAC EVENT MONITOR; Future    2. Essential hypertension  -     AMB POC EKG ROUTINE W/ 12 LEADS, INTER & REP  -     CARDIAC EVENT MONITOR; Future    3. Hyperlipidemia, unspecified hyperlipidemia type  -     AMB POC EKG ROUTINE W/ 12 LEADS, INTER & REP  -     CARDIAC EVENT MONITOR; Future    4. Paroxysmal atrial fibrillation (HCC)  -     AMB POC EKG ROUTINE W/ 12 LEADS, INTER & REP  -     CARDIAC EVENT MONITOR; Future    5. S/P cardiac cath    6. Tobacco use        ICD-10-CM ICD-9-CM    1. Coronary artery disease involving native coronary artery of native heart with unstable angina pectoris (HCC)  I25.110 414.01 AMB POC EKG ROUTINE W/ 12 LEADS, INTER & REP     411.1 CT DISCHARGE MEDS RECONCILED W/ CURRENT OUTPATIENT MED LIST      CARDIAC EVENT MONITOR   2. Essential hypertension  I10 401.9 AMB POC EKG ROUTINE W/ 12 LEADS, INTER & REP      CARDIAC EVENT MONITOR   3. Hyperlipidemia, unspecified hyperlipidemia type  E78.5 272.4 AMB POC EKG ROUTINE W/ 12 LEADS, INTER & REP      CARDIAC EVENT MONITOR   4.  Paroxysmal atrial fibrillation (HCC)  I48.0 427.31 AMB POC EKG ROUTINE W/ 12 LEADS, INTER & REP      CARDIAC EVENT MONITOR   5. S/P cardiac cath  Z98.890 V45.89    6. Tobacco use  Z72.0 305.1           Plan:     1. Coronary artery disease involving native coronary artery of native heart with unstable angina pectoris [facility])  Hospital admission for CP. Echo 2/13 showed preserved EF, no valvular disease. Cardiac cath 2/15/21 with [place] Redo LM lesion with significant FFR, s/p PCI/BOBBY. Denies further angina or anginal equivalent symptoms. C/o bleeding/bruising. Discussed this is expected SE of being on antiplatelet agents to prevent thrombus in stent. Continue Coreg, statin, ASA and Plavix. 2. Essential hypertension  BP well controlled. Continue current anti hypertensive regimen    3. Hyperlipidemia  On statin. LDL 5.6 2/14/21. Lipids and labs managed by PCP    4. Paroxysmal atrial fibrillation ([address]  Episode of racing HR with sob. Awareness of heart beat now. EKG SR. Obtain 2 week event monitor    5. Tobacco use  Not willing to quit at this time.       Costa Monae MD

## 2021-03-01 NOTE — TELEPHONE ENCOUNTER
PA completed through insurance for [facility] reader and sensory kit-- references number [id number]-- awaiting response

## 2021-03-01 NOTE — PROGRESS NOTES
Patient received a 2 week event monitor. Instructions given verbally as well as an instruction sheet. Pt verbalized understanding.     [facility] Event Monitoring

## 2021-03-03 ENCOUNTER — TELEPHONE (OUTPATIENT)
Dept: INTERNAL MEDICINE CLINIC | Age: 61
End: 2021-03-03

## 2021-03-03 DIAGNOSIS — J44.9 CHRONIC OBSTRUCTIVE PULMONARY DISEASE, UNSPECIFIED COPD TYPE (HCC): Primary | ICD-10-CM

## 2021-03-03 NOTE — TELEPHONE ENCOUNTER
Patient has [facility] [employer] and does not require an insurance referral.

## 2021-03-03 NOTE — TELEPHONE ENCOUNTER
----- Message from Monroe Mueller sent at 3/3/2021  8:53 AM EST -----  Regarding: Dr. Abdulaziz Valladares (first and last name if not the patient or from practice): Pt  Caller's relationship to patient (if not from a practice): N/A  Name of caller (if calling from a practice): N/A  Patient insurance Type [facility] Medicaid  Name of practice:N/A  Specialist's title, first, and last name: N/A  Specialist Type: \"Pulmonary Rehab\"  Office Phone Number: N/A  Fax number: N/A  Date and time of appointment: N/A  Reason for appointment: pt said that he has COPD and needs a lung specialist   Details to clarify the request: Pt requesting a referral for \"pulmonary rehab\" [phone]

## 2021-03-04 ENCOUNTER — TELEPHONE (OUTPATIENT)
Dept: INTERNAL MEDICINE CLINIC | Age: 61
End: 2021-03-04

## 2021-03-04 NOTE — TELEPHONE ENCOUNTER
----- Message from Alice Pickard sent at 3/4/2021 10:33 AM EST -----  Regarding: MD Douglas/Telephone  General Message/Vendor Calls    Caller's first and last name: self      Reason for call: Requesting pcp write letter for high risk. Callback required yes/no and why: Yes      Best contact number(s): [phone]      Details to clarify the request: Pt requesting a letter be written for him stating he is at high risk for COVID19 virus. Requesting it to be emailed to email on file.       Alice Pickard

## 2021-03-04 NOTE — ADT AUTH CERT NOTES
Utilization Reviews MAR 2/13/21 - 2/16/21 by Braydon Justin RN Review Entered Review Status 3/4/2021 12:26 In Primary Criteria Review Received a call from Dr. Chay Zuniga asking for records, please send him Progress notes, Labs, Meds, and radiology testing. Gómez Ramos MD MBA Senior Physician Advisor [address] [place] [address],[address] Medications 02/10/21 02/11/21 02/12/21 02/13/21 02/14/21 02/15/21 02/16/21 Completed Medications  
heparinized saline 2 units/mL infusion Freq: OPTIME CONTINUOUS PRN Start: 02/15/21 1154 End: 02/15/21 1154      11 (500 mL) Order ID: [id number]         
heparinized saline 2 units/mL infusion Freq: OPTIME CONTINUOUS PRN Start: 02/15/21 1154 End: 02/15/21 1154      11 (500 mL) Order ID: [id number]         
heparinized saline 2 units/mL infusion Freq: OPTIME CONTINUOUS PRN Start: 02/15/21 1154 End: 02/15/21 1154      11 (500 mL) Order ID: [id number] Discontinued Medications Medications 02/10/21 02/11/21 02/12/21 02/13/21 02/14/21 02/15/21 02/16/21  
0.9% sodium chloride infusion Rate: 75 mL/hr Dose: 75 mL/hr Freq: CONTINUOUS Route: IV Start: 02/14/21 1028 End: 02/16/21 0600     10 (75 mL/hr) 
 (23) [C] Order ID: [id number]     84 (75 mL/hr) 
    
0.9% sodium chloride infusion Rate: 75 mL/hr Dose: 75 mL/hr Freq: CONTINUOUS Route: IV Start: 02/13/21 2300 End: 02/13/21 1112 Order ID: [id number]         
acetaminophen (TYLENOL) tablet 650 mg  
Dose: 650 mg 
Freq: EVERY 6 HOURS AS NEEDED Route: PO 
PRN Reasons: Mild Pain,Fever PRN Comment: For temp greater than 100.4 F (38 C) Start: 02/13/21 1107 End: 02/16/21 1346    19 (650 mg) Admin Instructions:         
. Order ID: [id number] Or 
acetaminophen (TYLENOL) suppository 650 mg  
Dose: 650 mg 
Freq: EVERY 6 HOURS AS NEEDED Route: RE 
PRN Reasons: Mild Pain,Fever PRN Comment: For temp greater than 100.4 F (38 C) Start: 02/13/21 1107 End: 02/16/21 1346    19-See Alt Admin Instructions:         
. Order ID: [id number]         
acetaminophen (TYLENOL) tablet 650 mg  
Dose: 650 mg 
Freq: EVERY 6 HOURS AS NEEDED Route: PO 
PRN Reason: Mild Pain Start: 02/13/21 1106 End: 02/13/21 1124 Admin Instructions:         
. Order ID: [id number]         
albuterol-ipratropium (DUO-NEB) 2.5 MG-0.5 MG/3 ML Dose: 3 mL Freq: EVERY 4 HOURS AS NEEDED Route: NEBULIZATION 
PRN Reason: Wheezing Start: 02/13/21 1155 End: 02/16/21 1346 Order specific questions: MODE OF DELIVERY Nebulizer Order ID: [id number]         
apixaban (ELIQUIS) tablet 5 mg Dose: 5 mg Freq: 2 TIMES DAILY Route: PO Indications of Use: prevent thromboembolism in chronic atrial fibrillation Start: 02/16/21 0900 End: 02/16/21 1346       09 (5 mg) Order ID: [id number]         
aspirin delayed-release tablet 81 mg  
Dose: 81 mg 
Freq: DAILY Route: PO 
Start: 02/14/21 0900 End: 02/16/21 1346     10 (81 mg) 08 (81 mg) 09 (81 mg) Admin Instructions: Do not crush, break or chew. Swallow whole. Order ID: [id number]         
atorvastatin (LIPITOR) tablet 40 mg  
Dose: 40 mg 
Freq: DAILY Route: PO Indications of Use: hyperlipidemia Start: 02/14/21 0900 End: 02/16/21 1346     10 (40 mg) 20 (40 mg) [place] Order ID: [id number]         
bivalirudin (ANGIOMAX) 250 mg in 0.9% sodium chloride (MBP/ADV) 50 mL infusion Rate: 7.9 mL/hr Dose: 0.5 mg/kg/hr Weight Dosing Info: 79.3 kg Freq: CONTINUOUS Route: IV Start: 02/15/21 1310 End: 02/15/21 1323      13 (0.5 mg/kg/hr) Order ID: [id number]         
bivalirudin (ANGIOMAX) 250 mg in 0.9% sodium chloride (MBP/ADV) 50 mL infusion Freq: OPTIME CONTINUOUS PRN Start: 02/15/21 1235 End: 02/15/21 1321      12 (1.75 mg/kg/hr) Order ID: [id number]      13 
   
bivalirudin (ANGIOMAX) BOLUS Freq: AS NEEDED 
 Start: 02/15/21 1235 End: 02/15/21 1321      12 (59.475 mg) Order ID: [id number]         
carvediloL (COREG) tablet 12.5 mg  
Dose: 12.5 mg 
Freq: 2 TIMES DAILY WITH MEALS Route: PO Indications Comment: atrial fibrillation Start: 02/13/21 1148 End: 02/16/21 1346    12 (12.5 mg) 10 (12.5 mg) 08 (12.5 mg) 09 (12.5 mg) Admin Instructions:    17 (12.5 mg) 16 (12.5 mg) 17 (12.5 mg) GIVE WITH FOOD, MILK, OR ANTACID Order ID: [id number]         
carvediloL (COREG) tablet 12.5 mg  
Dose: 12.5 mg 
Freq: 2 TIMES DAILY WITH MEALS Route: PO Indications Comment: atrial fibrillation Start: 02/13/21 1700 End: 02/13/21 1147 Admin Instructions:         
GIVE WITH FOOD, MILK, OR ANTACID Order ID: [id number]         
clopidogreL (PLAVIX) tablet Freq: AS NEEDED Start: 02/15/21 1306 End: 02/15/21 1321      13 (600 mg) Order ID: [id number]         
clopidogreL (PLAVIX) tablet 75 mg Dose: 75 mg Freq: DAILY Route: PO 
Start: 02/16/21 0900 End: 02/16/21 1346       09 (75 mg) Order ID: [id number]         
dextrose (D50W) injection syrg 12.5-25 g Dose: 12.5-25 g Freq: AS NEEDED Route: IV 
PRN Reason: Hypoglycemia Start: 02/13/21 1109 End: 02/16/21 1346 Admin Instructions: If patient NPO, unconscious or unable to swallow and If Blood Glucose between 60 and 70 mg/dL: give 25 ml D50% IV. If Blood Glucose less than 60 mg/dL: give 50 ml D50% IV. Repeat blood glucose in 15 minutes. Continue to repeat blood glucose and treatment every 15 minutes until 
blood glucose is greater than 70 mg/dL and notify provider. Order ID: [id number]         
enoxaparin (LOVENOX) injection 40 mg  
Dose: 40 mg 
Freq: EVERY 24 HOURS Route: SC 
Start: 02/14/21 1700 End: 02/15/21 1409     16 (40 mg) Admin Instructions: Administer by deep subCUTAneous injection with pt lying down. Alternate injection sites on abdominal wall. Do not rub site after injection. Check with MD prior to any invasive procedure. If administering partial syringe, nurse to expel the air bubble and the excess drug prior to administering the dose Order ID: [id number]         
enoxaparin (LOVENOX) injection 80 mg  
Dose: 1 mg/kg Weight Dosing Info: 79.3 kg Freq: EVERY 12 HOURS Route: SC 
Start: 02/13/21 2200 End: 02/14/21 1027    21 (80 mg) (10) Admin Instructions:         
Administer by deep subCUTAneous injection with pt lying down. Alternate injection sites on abdominal wall. Do not rub site after injection. Check with MD prior to any invasive procedure. If administering partial syringe, nurse to expel the air bubble and the excess drug prior to administering the dose Order ID: [id number]         
enoxaparin (LOVENOX) injection 80 mg  
Dose: 1 mg/kg Weight Dosing Info: 79.3 kg Freq: EVERY 12 HOURS Route: SC 
Start: 02/13/21 1108 End: 02/13/21 1159    11 Admin Instructions:         
Administer by deep subCUTAneous injection with pt lying down. Alternate injection sites on abdominal wall. Do not rub site after injection. Check with MD prior to any invasive procedure. If administering partial syringe, nurse to expel the air bubble and the excess drug prior to administering the dose Order ID: [id number]         
fentaNYL citrate (PF) injection Freq: AS NEEDED 
PRN Comment: Sedation Start: 02/15/21 1208 End: 02/15/21 1321      12 (25 mcg) Order ID: [id number]         
gabapentin (NEURONTIN) capsule 200 mg Dose: 200 mg Freq: EVERY MORNING Route: PO 
Start: 02/13/21 1148 End: 02/16/21 1346    12 (200 mg) 10 (200 mg) 08 (200 mg) 09 (200 mg) Admin Instructions:         
Avoid administration with ORAL aluminum or magnesium containing products within 2 hours of gabapentin dose. Order ID: [id number]         
gabapentin (NEURONTIN) capsule 200 mg Dose: 200 mg Freq: EVERY MORNING Route: PO 
Start: 02/14/21 0800 End: 02/13/21 1147 Admin Instructions:         
Avoid administration with ORAL aluminum or magnesium containing products within 2 hours of gabapentin dose. Order ID: [id number]         
gabapentin (NEURONTIN) capsule 600 mg Dose: 600 mg Freq: EVERY BEDTIME Route: PO 
Start: 02/13/21 2200 End: 02/16/21 1346    21 (600 mg) 21 (600 mg) 20 (600 mg) Admin Instructions:      22 
   
avoid administration w/ aluminum or magnesium meds within 2 hr         
Order ID: [id number]         
glucagon (GLUCAGEN) injection 1 mg Dose: 1 mg Freq: AS NEEDED Route: IM 
PRN Reason: Hypoglycemia Start: 02/13/21 1109 End: 02/16/21 1346 Admin Instructions:         
Dilute with 1 mL of sterile water final concentration 1 mg/mL Administer Glucagon IM or subcutaneous per protocol if : 
Blood Glucose is less than 70 mg/dL AND patient unable to swallow or 
unconscious AND no IV access Repeat blood glucose in 15 minutes: If blood glucose is less than 70 mg/dL AND No IV access, give 1 mg Glucagon. Continue to repeat blood glucose and treatment every 15 minutes until 
blood glucose is greater than 70 mg/dL and notify provider. Roll patient on side after administration to prevent aspiration. Order ID: [id number]         
glucose chewable tablet 16 g Dose: 4 Tab Freq: AS NEEDED Route: PO 
PRN Reason: Hypoglycemia Start: 02/13/21 1109 End: 02/16/21 1346 Admin Instructions:         
If Blood Glucose less than 70 mg/dL AND conscious/able to swallow: 
Administer 4 Glucose tablets (16 grams). Do not administer glucose tabs if NPO for aspiration precautions. Repeat blood glucose in 15 minutes. Continue to repeat blood glucose and treatment every 15 minutes until 
blood glucose is greater than 70 mg/dL and notify provider. Order ID: [id number]         
heparin (porcine) 1,000 unit/mL injection Freq: AS NEEDED Start: 02/15/21 1220 End: 02/15/21 1321      12 (2,500 Units) Order ID: [id number]         
insulin glargine (LANTUS) injection 4 Units Dose: 4 Units Freq: DAILY Route: SC 
Start: 02/15/21 0900 End: 02/16/21 1346      08 (4 Units) 09 (4 Units) Order ID: [id number]         
insulin lispro (HUMALOG) injection Freq: 4 TIMES DAILY BEFORE MEALS & NIGHTLY Route: SC 
Start: 02/13/21 1130 End: 02/16/21 1346    12 (2 Units) [C] 
 (07) [C] 08 (2.5 Units) [C] 
 09 (5 Units) 11 [C] Admin Instructions:    18 (2 Units) 12 (5 Units) 17 (5 Units) INITIATE CORRECTIVE INSULIN PROTOCOL (MEAGAN): 
RX MEAGAN Normal Sensitivity (Average weight) AC (before meals), Q6H, and Q4H CORRECTIONAL SCALE only For Blood Sugar (mg/dl) of :  
140-199=2 units 200-249=3 units 250-299=5 units 300-349=7 units 350 or greater = Call MD 
Give in addition to basal medications. Do Not Hold for NPO BEDTIME CORRECTIONAL sliding scale when scheduled: 
200-249=2 units 250-299=3 units 300-349=4 units 350 or greater = Call MD 
Give in addition to basal medications. Do Not Hold for NPO Fast Acting - Administer Immediately - or within 15 minutes of start of meal, if mealtime coverage. 21 (2 Units) [C] 16 (2 Units) 20 (3 Units) [C] Order ID: [id number]     95 (3 Units) [C] 22 
   
iopamidoL (ISOVUE-370) 76 % injection Freq: AS NEEDED Start: 02/15/21 1236 End: 02/15/21 1321      12 (80 mL) Order ID: [id number]      11 (60 mL) lidocaine (PF) (XYLOCAINE) 10 mg/mL (1 %) injection Freq: AS NEEDED 
PRN Comment: Local Anesthetic Start: 02/15/21 1218 End: 02/15/21 1321      12 (0.5 mL) Order ID: [id number]         
melatonin tablet 6 mg Dose: 6 mg Freq: BEDTIME PRN Route: PO 
PRN Reason: Insomnia Start: 02/13/21 2215 End: 02/16/21 1346 Admin Instructions: Not intended for use by pregnant or nursing women. Order ID: [id number]         
midazolam (VERSED) injection Freq: AS NEEDED Start: 02/15/21 1208 End: 02/15/21 1321      12 (2 mg) Order ID: [id number]         
morphine 10 mg/ml injection 4 mg Dose: 4 mg Freq: ONCE Route: IV Start: 02/13/21 1600 End: 02/14/21 0359    (16) Order ID: [id number]         
morphine injection 1 mg Dose: 1 mg Freq: EVERY 4 HOURS AS NEEDED Route: IV 
PRN Reason: Severe Pain Start: 02/13/21 1111 End: 02/13/21 2215    18 (1 mg) Order ID: [id number]         
morphine injection 2 mg Dose: 2 mg Freq: EVERY 4 HOURS AS NEEDED Route: IV 
PRN Reason: Severe Pain Start: 02/13/21 2214 End: 02/16/21 1346 Order ID: [id number]         
naloxone [facility]) injection 0.4 mg  
Dose: 0.4 mg 
Freq: AS NEEDED Route: IV 
PRN Reason: Other PRN Comment: Give if breaths per minute are less than 10, may repeat dose in 15 minutes and contact MD 
Start: 02/15/21 1410 End: 02/16/21 1346 Admin Instructions: May repeat in 10 minutes if respiration is less than 10 Breaths Per Minute. Order ID: [id number]         
nicotine (NICODERM CQ) 21 mg/24 hr patch 1 Patch Dose: 1 Patch Freq: DAILY Route: TD Start: 02/13/21 1158 End: 02/16/21 1346    11 (1 Patch) 10 (1 Patch) 08 (1 Patch) 08 (1 Patch) 09 (1 Patch) Order ID: [id number]     74 (1 Patch) 09 (1 Patch) 10 [C] 
  
nitroglycerin (NITROSTAT) tablet 0.4 mg  
Dose: 0.4 mg 
Freq: AS NEEDED Route: SL 
PRN Reason: Chest Pain Start: 02/13/21 1126 End: 02/16/21 1346    15 (0.4 mg) Admin Instructions:    15 (0.4 mg) May repeat every 5 minutes for a maximum of 3 doses if chest pain not relieved call MD     15 (0.4 mg) Order ID: [id number]         
nitroglycerin (Tridil) 200 mcg/ml infusion Rate: 0-30 mL/hr Dose: 0-100 mcg/min Freq: TITRATE Route: IV Start: 02/13/21 1600 End: 02/15/21 1404    16 (10 mcg/min) 13 (30 mcg/min) 15 Order specific questions:    17 (15 mcg/min) 21 (30 mcg/min) Titrate Infusion? Yes 
Initial Infusion Rate: 10 mcg/min Titrate Every 5 Minutes in Increments of: 5 mcg/min Goal of Therapy is: Reduction in Chest Pain Contact Physician and Hold Infusion for: SBP less than 90 mmHg    19 (20 mcg/min) Order ID: [id number]         
nitroglycerin (Tridil) 200 mcg/ml infusion Rate: 0-6 mL/hr Dose: 0-20 mcg/min Freq: TITRATE Route: IV Start: 02/13/21 1559 End: 02/13/21 1559    15 Order specific questions:         
Titrate Infusion? Yes 
Initial Infusion Rate: 10 mcg/min Titrate Every 5 Minutes in Increments of: 5 mcg/min Goal of Therapy is: SBP less than 160 mmHg Contact Physician and Hold Infusion for: SBP less than 90 mmHg Order ID: [id number]         
nitroglycerin 0.1 mg/mL in D5W injection Freq: AS NEEDED Start: 02/15/21 1220 End: 02/15/21 1321      12 (200 mcg) Order ID: [id number]         
promethazine (PHENERGAN) tablet 12.5 mg  
Dose: 12.5 mg 
Freq: EVERY 6 HOURS AS NEEDED Route: PO 
PRN Reason: Nausea or Vomiting Start: 02/13/21 1107 End: 02/16/21 1346 Order ID: [id number] Or 
ondansetron (ZOFRAN) injection 4 mg Dose: 4 mg Freq: EVERY 6 HOURS AS NEEDED Route: IV 
PRN Reason: Nausea or Vomiting Start: 02/13/21 1107 End: 02/16/21 1346 Admin Instructions:         
Administer if oral route cannot be used Order ID: [id number]         
ondansetron [facility]) injection 4 mg Dose: 4 mg Freq: EVERY 4 HOURS AS NEEDED Route: IV 
PRN Reason: Nausea or Vomiting Start: 02/13/21 1106 End: 02/13/21 1125 Admin Instructions:         
Administer 4 to 7 mg IV over 30 seconds. Administer 8mg IV over 60 seconds. Administer doses greater than 8mg IV over at least 2 minutes. Order ID: [id number]         
polyethylene glycol (MIRALAX) packet 17 g Dose: 17 g Freq: DAILY PRN Route: PO 
 PRN Reason: Constipation Start: 02/13/21 1107 End: 02/16/21 1346 Admin Instructions:         
First line therapy for constipation Order ID: [id number]         
sodium chloride (NS) flush 5-40 mL Dose: 5-40 mL Freq: AS NEEDED Route: IV 
PRN Reason: Line Patency Start: 02/15/21 1410 End: 02/16/21 1346 Admin Instructions:         
If following IV push medication, administer flush at the same rate as the IV push. Flush volume is determined by type of infusion therapy being given. For non-viscous solutions use Peripheral IV = 5 mL; Midline or Central Line = 10 mL/lumen. For viscous solutions (i.e. blood components, parenteral nutrition, contrast media, or after obtaining blood sample) use Peripheral IV= 10 mL; Midline or Central Line = 20 mL/lumen. Order ID: [id number]         
sodium chloride (NS) flush 5-40 mL Dose: 5-40 mL Freq: EVERY 8 HOURS Route: IV Start: 02/15/21 1500 End: 02/16/21 1346      17 (10 mL) 04 (10 mL) Admin Instructions:      20 (10 mL) For Line Patency: Peripheral IV = 5 mL; Midline or Central Line = 10 mL/lumen. 22 
   
Order ID: [id number]         
sodium chloride (NS) flush 5-40 mL Dose: 5-40 mL Freq: AS NEEDED Route: IV 
PRN Reason: Line Patency Start: 02/13/21 1107 End: 02/16/21 1346 Admin Instructions:         
If following IV push medication, administer flush at the same rate as the IV push. Flush volume is determined by type of infusion therapy being given. For non-viscous solutions use Peripheral IV = 5 mL; Midline or Central Line = 10 mL/lumen. For viscous solutions (i.e. blood components, parenteral nutrition, contrast media, or after obtaining blood sample) use Peripheral IV= 10 mL; Midline or Central Line = 20 mL/lumen. Order ID: [id number]         
sodium chloride (NS) flush 5-40 mL Dose: 5-40 mL Freq: EVERY 8 HOURS Route: IV Start: 02/13/21 1400 End: 02/16/21 1346    18 (10 mL) 06 
 06 
 06 Admin Instructions:    [phone] [C] For Line Patency: Peripheral IV = 5 mL; Midline or Central Line = 10 mL/lumen. 21 (40 mL) 25 Order ID: [id number]         
verapamiL (ISOPTIN) 2.5 mg/mL injection Freq: AS NEEDED Start: 02/15/21 1220 End: 02/15/21 1321      12 (2.5 mg) Order ID: [id number] Medications 02/10/21 02/11/21 02/12/21 02/13/21 02/14/21 02/15/21 02/16/21 DOS 2/15/21 by Myesha Gates, RN Review Entered Review Status 3/4/2021 12:25 In Primary Criteria Review Hospitalist Internal Medicine Progress Notes Date of Service: 02/15/21 1024 Assessment / Plan:  
Chest pain CAD declined CABG in 2018 PAF  
HTN Dyslipidemia Mildly elevated troponin PAD s/p remote SFA angioplasty CXR 02/13/21: Minimal diffuse interstitial prominence may represent minimal edema  
or atypical viral pneumonia. Echo 02/13/21:  
· LV: Estimated LVEF is 55 - 60%. Visually measured ejection fraction. Normal cavity size, wall thickness and systolic function (ejection fraction normal). · AV: Probably trileaflet aortic valve. · RV: Not well visualized. - Continue asa 81 mg po daily. - Continue carvedilol 12.5 mg po bid. - Continue atorvastatin 40 mg po daily. - Continue NTG gtt.  
- Hold lisinopril for now. - Hold apixaban for now. - Plan for cardiac cath today. Asymptomatic hyponatremia, resolved - Monitor. Renal insuffiencey likely NOEMY, resolved - Continue to hold ACEI at this time. - Continue to avoid nephrotoxins.  
- Monitor. Acute anemia, resolved - Patient had initial drop in H/H after admission but stable since. - Guaiac stool, pending.  
- Monitor. COPD Tobacco abuse - Continue nicotine patch. - Smoking cessation reinforced. Poorly controlled DM II Hyperglycemia Neuropathy HgbA1C 02/13/21: 8.5  
- Continue to hold oral diabetes meds. - Add low dose basal (glargine) insulin, 4 units sc daily. - Continue FSBS with SSI correction scale. - Continue gabapentin 200 mg qam and 600 mg po qhs.  
25.0 - 29.9 Overweight / Body mass index is 25.82 kg/m². Code status: Full Prophylaxis: Lovenox Recommended Disposition: Home w/Family Subjective: Chief Complaint / Reason for Physician Visit Patient reports one episode of fleeting chest pain late yesterday afternoon, none since. Denies SOB. Plan of care and pertinent events reviewed with bedside nurse. Objective: VITALS:  
Last 24hrs VS reviewed since prior progress note. Most recent are:  
Patient Vitals for the past 24 hrs:  
Temp Pulse Resp BP SpO2  
02/15/21 0734 97.7 °F (36.5 °C) 92 16 (!) 140/78 95 % 02/15/21 0338 98.1 °F (36.7 °C) 76 20 131/67 92 % 02/14/21 2301 98 °F (36.7 °C) 87 18 120/68 95 % Intake/Output Summary (Last 24 hours) at 2/15/2021 1024 Last data filed at 2/15/2021 8449 Gross per 24 hour Intake 2843.55 ml Output 2000 ml Net 843.55 ml I had a face to face encounter and independently examined this patient on 2/15/2021, as outlined below: PHYSICAL EXAM:  
General: A/A/O X 3. NAD. HEENT: Normocephalic. Sclera anicteric. EOMI. Mucous membranes moist.  
Chest: Resps even/unlabored with symmetrical CWE. Air entry diminished. Lungs CTA. No use of accessory muscles. CV: RRR. Normal S1/S2. No M/C/R appreciated. No JVD. No peripheral edema. GI: Abdomen soft/NT/ND. No organomegaly. No hernia. ABT X 4.  
: Voiding. Neurologic: Nonfocal. CN II-XII grossly intact. Face symmetrical. Speech normal.  
Psych: Cooperative. No anxiety or agitation. No suicidal/homicidal ideation. Skin: No rashes or jaundice. Recent Labs   
 02/15/21  
0353 02/14/21  
0327 02/13/21  
7897 WBC 8.2 7.0 10.3 HGB 12.5 12.0* 15.4 HCT 37.3 37.3 45.6  170 197 Recent Labs   
 02/15/21  
0353 02/14/21  
0327 02/13/21  
3996  135* 139   
K 3.9 4.5 4.4   
* 107 103 CO2 23 23 25 * 242* 271* BUN 16 15 17 CREA 1.13 1.14 1.48* CA 8.0* 8.6 9.2 MG 1.9 --  1.9 PHOS 2.8 --  --    
ALB --  --  3.7 TBILI --  --  0.3 ALT --  --  18 INR --  --  1.0 Cardiology Progress Notes Date of Service: 02/15/21 1339 Assessment:  
Active Problems:  
Paroxysmal atrial fibrillation ([facility] (1/10/2020) Hyperlipidemia, unspecified hyperlipidemia type (1/10/2020) Tobacco use (1/10/2020) Chronic obstructive pulmonary disease (HCC) () Type 2 diabetes mellitus with diabetic neuropathy ([facility] (12/7/2020) Chest pain (2/13/2021) Coronary artery disease involving native coronary artery of native heart with unstable angina pectoris ([facility] (2/14/2021) Plan:  
Unstable angina, CAD with history of three-vessel disease, declined CABG in 2018: s/p PCI/BOBBY to ostial LM. EKG negative for acute ischemia, troponin negative x2 Follow post-cath recovery orders Continue IVF Obtain EKG post-procedure CBC,BMP in am  
LDL 54.4, A1C 8.5 Continue ASA, statin, coreg, plavix Will be on triple therapy (ASA,Plavix, and Eliquis x 1 month). Then stop ASA. No history of GIB according to patient. Will monitor. History of A. Fib:  
Resume eliquis in am  
Continue BB  
HTN: controlled Continue Coreg Type 2 diabetes with neuropathy: poorly controlled A1C 8.5, manage per internal medicine Would recommend DTC consult PAD/History of left SFA angioplasty Continue Neurontin Tobacco use Cessation counseled, nicotine patch continued Mild elevation of Cr: improved Monitor BMP in am  
Hydrate for [facility]IS protocol Avoid nephrotoxic agents Patient will stay overnight, plan for DC in am.   
DOS 2/14/21 by Chu Duffy RN Review Entered Review Status 3/4/2021 12:19 In Primary Criteria Review Cardiology Consults Date of Service: 02/14/21 1024 Assessment:  
Active Problems:  
Paroxysmal atrial fibrillation ([facility] (1/10/2020) Hyperlipidemia, unspecified hyperlipidemia type (1/10/2020) Tobacco use (1/10/2020) Chronic obstructive pulmonary disease (HCC) () Type 2 diabetes mellitus with diabetic neuropathy ([facility] (12/7/2020) Chest pain (2/13/2021) Coronary artery disease involving native coronary artery of native heart with unstable angina pectoris ([facility] (2/14/2021) Plan:  
Cath lazaro. Pt understands that MV disease with type 2 DM gets better long term outcomes with CABG, but he still wants PCI only. He understands that it may require more than one procedure depending on complexity and presence of 's  
Hospitalist Internal Medicine Progress Notes Date of Service: 02/14/21 1219 Assessment / Plan:  
Chest pain CAD declined CABG in 2018 PAF  
HTN Dyslipidemia Mildly elevated troponin PAD s/p remote SFA angioplasty CXR 02/13/21: Minimal diffuse interstitial prominence may represent minimal edema  
or atypical viral pneumonia. Echo 02/13/21:  
· LV: Estimated LVEF is 55 - 60%. Visually measured ejection fraction. Normal cavity size, wall thickness and systolic function (ejection fraction normal). · AV: Probably trileaflet aortic valve. · RV: Not well visualized. - Continue asa 81 mg po daily. - Continue carvedilol 12.5 mg po bid. - Continue atorvastatin 40 mg po daily.  
- Hold lisinopril for now. - Hold apixaban for now. - Plan for cardiac cath tomorrow. Asymptomatic hyponatremia  
- No tx indicated. - Monitor. Renal insuffiencey likely NOEMY  
- Renal function now WNL. - Hold ACEI at this time. - Avoid nephrotoxins.  
- Monitor. Acute anemia  
- Hgb down 3.4 gm since admission.  
- Recheck H/H now.  
- Guaiac stool.  
- Monitor. COPD Tobacco abuse - Continue nicotine patch. - Smoking cessation reinforced. Poorly controlled DM II Hyperglycemia Neuropathy HgbA1C 02/13/21: 8.5  
- Continue to hold oral diabetes meds. - Continue FSBS with SSI correction scale. - Continue gabapentin 200 mg qam and 600 mg po qhs.  
25.0 - 29.9 Overweight / Body mass index is 25.82 kg/m². Code status: Full Prophylaxis: Lovenox Recommended Disposition: Home w/Family Subjective: Chief Complaint / Reason for Physician Visit No complaints. Denies chest pain, palpitations, and SOB. Plan of care and pertinent events reviewed with bedside nurse. Objective: VITALS:  
Last 24hrs VS reviewed since prior progress note. Most recent are:  
Patient Vitals for the past 24 hrs:  
Temp Pulse Resp BP SpO2  
02/14/21 1157 98.1 °F (36.7 °C) 81 20 139/79 95 % 02/14/21 1100  87 24 (!) 147/84 94 % 02/14/21 1000  87 15 (!) 150/84 96 % 02/14/21 0900  75 18 (!) 146/84 93 % 02/14/21 0800  83 17 (!) 126/59 92 % 02/14/21 0400  84 14 (!) 141/77 96 % 02/14/21 0328 98 °F (36.7 °C) 84 18 125/72 93 % 02/14/21 0300  80 17 125/72 91 % 02/14/21 0200  81 19 118/66 93 % 02/14/21 0000  89 21 110/60 91 % 02/13/21 2300  88 20 115/66 91 % Intake/Output Summary (Last 24 hours) at 2/14/2021 1219 Last data filed at 2/14/2021 1100 Gross per 24 hour Intake 106.25 ml Output  Net 106.25 ml I had a face to face encounter and independently examined this patient on 2/14/2021, as outlined below: PHYSICAL EXAM:  
General: A/A/O X 3. NAD. HEENT: Normocephalic. Sclera anicteric. EOMI. Mucous membranes moist.  
Chest: Resps even/unlabored with symmetrical CWE. Air entry diminished. Lungs CTA. No use of accessory muscles. CV: RRR. Normal S1/S2. No M/C/R appreciated. No JVD. No peripheral edema. GI: Abdomen soft/NT/ND. No organomegaly. No hernia. ABT X 4.  
: Voiding. Neurologic: Nonfocal. CN II-XII grossly intact. Face symmetrical. Speech normal.  
Psych: Cooperative. No anxiety or agitation. No suicidal/homicidal ideation. Skin: No rashes or jaundice. Recent Labs   
 02/14/21  
0327 02/13/21  
0329 WBC 7.0 10.3 HGB 12.0* 15.4 HCT 37.3 45.6  197 Recent Labs   
 02/14/21  
0327 02/13/21  
4105 * 139   
K 4.5 4.4   
 103 CO2 23 25 * 271* BUN 15 17 CREA 1.14 1.48* CA 8.6 9.2 MG --  1.9 ALB --  3.7 TBILI --  0.3 ALT --  18 INR --  1.0 Additional Clinical by Belkis Valdovinos RN Review Entered Review Status 2/18/2021 12:03 In Primary Criteria Review Nitro iv drip continued until cath Cath on 2/15:  
Successful iFR and IVUS guided stenting of ostial LMT Left Main Ost LM lesion, 80% stenosed. Pressure wire/iFR was perfromed on the lesion. Flow Reserve: 0.68. There is severe plaque burden detected. The lesion has a fibro-fatty core. Left Anterior Descending The vessel was visualized by angiography. The vessel is angiographically normal.  
Left Circumflex The vessel was visualized by angiography. The vessel is angiographically normal.  
Right Coronary Artery The vessel was visualized by angiography. The vessel is angiographically normal.  
  
Covid 19 by Amalia Delarosa RN Review Entered Review Status 2/14/2021 18:49 In Primary Criteria Review Is the illness suspected to be related to the Coronavirus (COVID-19)? Other Has the member been tested for the COVID-19? Yes If Yes, what are the results of the COVID-19? Negative What is the severity of the members condition (i.e. Isolation, Ventilator use)? None

## 2021-03-08 ENCOUNTER — TELEPHONE (OUTPATIENT)
Dept: INTERNAL MEDICINE CLINIC | Age: 61
End: 2021-03-08

## 2021-03-08 NOTE — TELEPHONE ENCOUNTER
Return call made to patient to advise of medication usage; no answer left detailed voicemail regarding medications. Advised for a call back if any questions.

## 2021-03-08 NOTE — TELEPHONE ENCOUNTER
----- Message from Tennille Steele sent at 3/8/2021 11:27 AM EST -----  Regarding: /telephone  Contact: [phone]  General Message/Vendor Calls    Caller's first and last name: N/A      Reason for call: He just got \"Farxiga\" 10mg from the pharmacy, he would like to know what it is for,and if it replaces the glimepiride he takes it bid.        Callback required yes/no and why: Yes      Best contact number(s):[phone]       Details to clarify the request: N/A      Tennille Steele

## 2021-03-16 ENCOUNTER — TELEPHONE (OUTPATIENT)
Dept: CARDIOLOGY CLINIC | Age: 61
End: 2021-03-16

## 2021-03-16 NOTE — TELEPHONE ENCOUNTER
Lisa Owens  Can you call pt to schedule an appt with Dr. Ros Barreto to discuss loop monitor results within the next couple of weeks.   Thank you

## 2021-03-16 NOTE — PROGRESS NOTES
Monitor shows no harmful heart rhythms. His symptoms of skipped beats at times correlated with early beats which are benign. We all have them to some extent. BB helps treat these. Limit caffeine, meds with pseudoephedrine.

## 2021-03-16 NOTE — TELEPHONE ENCOUNTER
Spoke with patient. Verified with two patient identifiers. Advised Monitor shows no harmful heart rhythms. His symptoms of skipped beats at times correlated with early beats which are benign. We all have them to some extent. BB helps treat these. Limit caffeine, meds with pseudoephedrine. Pt states that he would like to schedule a f/u to discuss. I advised him that Elda Tadeo would be calling him to schedule appt with Dr. Sylvia Luke to discuss. Patient verbalized understanding.

## 2021-03-16 NOTE — TELEPHONE ENCOUNTER
----- Message from Nate King NP sent at 3/16/2021  1:50 PM EDT -----  Monitor shows no harmful heart rhythms. His symptoms of skipped beats at times correlated with early beats which are benign. We all have them to some extent. BB helps treat these. Limit caffeine, meds with pseudoephedrine.

## 2021-03-26 ENCOUNTER — OFFICE VISIT (OUTPATIENT)
Dept: INTERNAL MEDICINE CLINIC | Age: 61
End: 2021-03-26
Payer: MEDICAID

## 2021-03-26 VITALS
WEIGHT: 174 LBS | RESPIRATION RATE: 14 BRPM | OXYGEN SATURATION: 95 % | DIASTOLIC BLOOD PRESSURE: 77 MMHG | SYSTOLIC BLOOD PRESSURE: 114 MMHG | HEIGHT: 69 IN | TEMPERATURE: 98.5 F | HEART RATE: 88 BPM | BODY MASS INDEX: 25.77 KG/M2

## 2021-03-26 DIAGNOSIS — F32.2 CURRENT SEVERE EPISODE OF MAJOR DEPRESSIVE DISORDER WITHOUT PSYCHOTIC FEATURES WITHOUT PRIOR EPISODE (HCC): Primary | ICD-10-CM

## 2021-03-26 DIAGNOSIS — I25.110 CORONARY ARTERY DISEASE INVOLVING NATIVE CORONARY ARTERY OF NATIVE HEART WITH UNSTABLE ANGINA PECTORIS (HCC): ICD-10-CM

## 2021-03-26 DIAGNOSIS — I48.0 PAROXYSMAL ATRIAL FIBRILLATION (HCC): ICD-10-CM

## 2021-03-26 DIAGNOSIS — E11.40 TYPE 2 DIABETES MELLITUS WITH DIABETIC NEUROPATHY, WITHOUT LONG-TERM CURRENT USE OF INSULIN (HCC): ICD-10-CM

## 2021-03-26 PROCEDURE — 99214 OFFICE O/P EST MOD 30 MIN: CPT | Performed by: INTERNAL MEDICINE

## 2021-03-26 RX ORDER — ESCITALOPRAM OXALATE 10 MG/1
10 TABLET ORAL DAILY
Qty: 30 TAB | Refills: 1 | Status: SHIPPED | OUTPATIENT
Start: 2021-03-26 | End: 2021-05-25

## 2021-03-26 RX ORDER — GLIMEPIRIDE 1 MG/1
1 TABLET ORAL 2 TIMES DAILY
Qty: 60 TAB | Refills: 1 | Status: SHIPPED
Start: 2021-03-26 | End: 2021-05-24 | Stop reason: DRUGHIGH

## 2021-03-26 NOTE — PROGRESS NOTES
Assessment and Plan   Diagnoses and all orders for this visit:    History DM2 (8.5% 2/2021), CAD s/p BOBBY to ostial LM (p/w sx 2/2021, no MI), afib, PAD s/p L SFA angioplasty (Dr. Venus Valdez), HTN, HLD, tobacco use    See HPI for visit history    1. Current severe episode of major depressive disorder without psychotic features without prior episode ([facility])  -     escitalopram oxalate (LEXAPRO) 10 mg tablet; Take 1 Tab by mouth daily. Indications: depression  \"I can't get out of my own way. I think I might be depressed. \"  Doesn't want to work, decreased interest.  Feeling isolated because of the pandemic. Currently going through a divorce. \"I don't have anything to look forward to. \"    PHQ9 19. No SI or HI. Rec starting lexapro    2. Type 2 diabetes mellitus with diabetic neuropathy, without long-term current use of insulin ([facility])  -     glimepiride (AMARYL) 1 mg tablet; Take 1 Tab by mouth two (2) times a day. Indications: type 2 diabetes mellitus  Has stopped insulin because prescription ran out. Fasting glucose >200. Was 130 this morning but only because he didn't eat much last night per pt. Currently on metformin 1000 BID and farxiga 10mg. He felt glipizide did really well for him. Rec starting glimepiride BID (pt feels he needs the medicine BID), which has better cadia safety data. 3. Coronary artery disease involving native coronary artery of native heart with unstable angina pectoris (HCC)  4. Paroxysmal atrial fibrillation ([address]  Has since stopped aspirin. On eliquis and plavix. Recent event monitor with no significant arrhythmias. Symptoms correlated with premature beats. Cont coreg. Referral to cardiac rehab    Benefits, risks, possible drug interactions, and side effects of all new medications were reviewed with the patient. Pt verbalized understanding. Return to clinic:  2 weeks for DM2    An electronic signature was used to authenticate this note.   Joe Eason MD  [facility]  3/27/2021    Future Appointments   Date Time Provider Ximena Varghese   3/1/7548  1:02 PM Jayne Mina MD [facility] BS AMB   4/12/2021  2:30 PM Marii Dietrich MD [facility] BS AMB   9/13/2021  3:15 PM Marii Dietrich MD [facility] BS AMB        Subjective   Chief Complaint   \"I think I might have depression\"    Terry Cox is a 62 y.o. male     1/10/20 NP   DM2 - glipizide, metformin, gabapentin for neuropathy, statin  Htn, afib, HLD - inc lisinopril to 10; coreg 12.5 BID, eliquis, asa   ED - tadalafil and sildenafil  Left foot ulcer - wound care downstairs, bone biopsy planned  Tobacco use - not interested in quitting    8/27/20 follow up  Easy bruising- ongoing for the past 2 years; likely eliquis and asa, CBC, coags normal  Has since healed after biopsy and abx  Bone biopsy Cx 1/24/20 with pan susceptible Enterobacter  Now s/p lt SFA angioplasty. Saw wound care downstairs  Dr. Darcie Alexander with vascular  Tobacco use - CT screening ordered  Hearing loss -ENT referral  No other changes    9/29/20 pilonidal cyst - referral to surg    12/7/20  Left foot numbness - pedal pulses difficult to palpate, slow cap refill; increased gabapentin to 200am/600pm, advised vascular follow up  BP elevated, but no other med changes    2/13-2/16/21 admission for palpitations, SOB, CP  cardiac catheterization with BOBBY to ostial LM  previously offered a CABG in 2018 but he declined at that time  Echo with normal EF    2/23/21 hospital follow up  COCHRAN - worse than when discharged  Palpitations - increased coreg to 25 BID  DM2 - A1c during admission 8.5 from 7.6. Was started on lantus 4 units. Stopped glipizide due to cardiac dz, started farxiga. Advised to cont lantus and metformin  Nausea - after eating in the evening (present prior to admission).  Didn't have problem during admission; smaller portion sizes in hospital. Advised smaller but more frequent meals, monitor  Back pain - MSK, stretches, lidocaine patches    3/1/21 cards Dr. Ajay Delgado - event monitor ordered    3/27/21  Depression - new problem. PHQ9 19.  going through divorce. started lexapro  DM2 - poorly controlled. start glimepiride 1mg bid (pt wants to do bid). cont metformin 1000 BID, farxiga 10  CAD/palpitations - premature beats on event monitor. cont coreg      Objective   Vitals:       Visit Vitals  /77 (BP 1 Location: Left upper arm, BP Patient Position: Sitting, BP Cuff Size: Adult)   Pulse 88   Temp 98.5 °F (36.9 °C) (Oral)   Resp 14   Ht 5' 9\" (1.753 m)   Wt 174 lb (78.9 kg)   SpO2 95%   BMI 25.70 kg/m²        Physical Exam  Constitutional:       Appearance: Normal appearance. He is not ill-appearing. Cardiovascular:      Rate and Rhythm: Normal rate and regular rhythm. Heart sounds: No murmur. No friction rub. No gallop. Pulmonary:      Effort: No respiratory distress. Breath sounds: Normal breath sounds. No wheezing, rhonchi or rales. Neurological:      Mental Status: He is alert. Current Outpatient Medications   Medication Sig    glimepiride (AMARYL) 1 mg tablet Take 1 Tab by mouth two (2) times a day. Indications: type 2 diabetes mellitus    escitalopram oxalate (LEXAPRO) 10 mg tablet Take 1 Tab by mouth daily. Indications: depression    tadalafiL (CIALIS) 20 mg tablet Take 20 mg by mouth daily as needed.  dapagliflozin (FARXIGA) 10 mg tab tablet Take 1 Tab by mouth daily. Indications: type 2 diabetes mellitus    carvediloL (COREG) 25 mg tablet Take 1 Tab by mouth two (2) times daily (with meals).  Indications: atrial fibrillation    Blood-Glucose Meter monitoring kit Use 1-3 times daily to monitor sugar    glucose blood VI test strips (ASCENSIA AUTODISC VI, ONE TOUCH ULTRA TEST VI) strip Use 1-3 times daily to monitor sugar    lancets misc Use 1-3 times daily to monitor sugar    alcohol swabs padm Use 1-3 times daily to monitor sugar    metFORMIN (GLUCOPHAGE) 1,000 mg tablet TAKE ONE TABLET BY MOUTH TWICE A DAY WITH MEALS    clopidogreL (PLAVIX) 75 mg tab Take 1 Tab by mouth daily.  Insulin Needles, Disposable, (Lite Touch Insulin Pen Needles) 31 gauge x 1/4\" ndle 30 Pen Needle by Does Not Apply route nightly.  terbinafine HCL (LAMISIL) 250 mg tablet Take 250 mg by mouth daily.  gabapentin (NEURONTIN) 100 mg capsule Take 2 Caps by mouth Every morning. Max Daily Amount: 200 mg.    gabapentin (NEURONTIN) 300 mg capsule Take 2 Caps by mouth nightly. Max Daily Amount: 600 mg.    atorvastatin (LIPITOR) 40 mg tablet Take 1 Tab by mouth daily. Indications: excessive fat in the blood    lisinopriL (PRINIVIL, ZESTRIL) 10 mg tablet Take 1 Tab by mouth daily. Indications: high blood pressure    apixaban (Eliquis) 5 mg tablet Take 1 Tab by mouth two (2) times a day. Indications: treatment to prevent blood clots in chronic atrial fibrillation    multivitamin (ONE A DAY) tablet Take 1 Tab by mouth daily. No current facility-administered medications for this visit.

## 2021-03-26 NOTE — PATIENT INSTRUCTIONS
Continue taking metformin 1000mg twice a day and farxiga 10mg once a day. Start taking glimepiride 1mg twice a day. Start taking lexapro for depression.

## 2021-03-27 PROBLEM — F32.2 CURRENT SEVERE EPISODE OF MAJOR DEPRESSIVE DISORDER WITHOUT PSYCHOTIC FEATURES WITHOUT PRIOR EPISODE (HCC): Status: ACTIVE | Noted: 2021-03-27

## 2021-03-29 ENCOUNTER — TELEPHONE (OUTPATIENT)
Dept: INTERNAL MEDICINE CLINIC | Age: 61
End: 2021-03-29

## 2021-03-29 NOTE — TELEPHONE ENCOUNTER
Call made to patient to provide referral information. Advised patient to call number provided to make an appointment. Patient verbalized understanding and was thankful for the call.

## 2021-03-29 NOTE — TELEPHONE ENCOUNTER
----- Message from Gasper Watters MD sent at 5/94/6419 11:00 AM EDT -----  Regarding: call  Please advise patient to call [phone] to schedule an appointment for cardiac rehab. Referral placed in chart.

## 2021-03-29 NOTE — TELEPHONE ENCOUNTER
----- Message from Jaja Chan MD sent at 8/12/4646 11:00 AM EDT -----  Regarding: call  Please advise patient to call [phone] to schedule an appointment for cardiac rehab. Referral placed in chart.

## 2021-03-30 ENCOUNTER — TRANSCRIBE ORDER (OUTPATIENT)
Dept: CARDIAC REHAB | Age: 61
End: 2021-03-30

## 2021-03-30 ENCOUNTER — DOCUMENTATION ONLY (OUTPATIENT)
Dept: INTERNAL MEDICINE CLINIC | Age: 61
End: 2021-03-30

## 2021-03-30 DIAGNOSIS — Z95.5 STENTED CORONARY ARTERY: Primary | ICD-10-CM

## 2021-03-30 NOTE — PROGRESS NOTES
PA completed through insurance medical department that was provided DME code  for [facility] reader and sensory kit (/ )-- references number [id number] -- awaiting response

## 2021-04-08 ENCOUNTER — TELEPHONE (OUTPATIENT)
Dept: INTERNAL MEDICINE CLINIC | Age: 61
End: 2021-04-08

## 2021-04-08 NOTE — TELEPHONE ENCOUNTER
----- Message from Khadra Carranza sent at 4/8/2021 10:24 AM EDT -----  Regarding: Dr. Kindra Bush Message/Vendor Calls    Caller's first and last name: Pedro Cox from [facility] insurance       Reason for call: Needs some information for a pre-authorization for patient. Callback required yes/no and why: yes, to discuss       Best contact number(s): [phone]      Details to clarify the request: Please call back or reply to fax that was already sent.        Khadra Carranza

## 2021-04-14 ENCOUNTER — DOCUMENTATION ONLY (OUTPATIENT)
Dept: INTERNAL MEDICINE CLINIC | Age: 61
End: 2021-04-14

## 2021-04-14 NOTE — PROGRESS NOTES
PA denied: reason for denial health plan requires certain information including office notes showing treatment and labs. Will send detailed information that is needed for this PA through the appeals department.              PA completed through insurance medical department that was provided DME code  for [facility] reader and sensory kit (/ )-- references number [id number] -- awaiting response

## 2021-04-19 ENCOUNTER — DOCUMENTATION ONLY (OUTPATIENT)
Dept: INTERNAL MEDICINE CLINIC | Age: 61
End: 2021-04-19

## 2021-04-19 NOTE — PROGRESS NOTES
Request Reference Number: [id number]. GLIMEPIRIDE TAB 1MG is approved through 04/19/2022            Key: [id number] - PA Case ID: [id number] - Rx #: [id number] Need help?  Call us at [phone]   Status   Sent to Plan today   DrugGlimepiride 1MG tablets

## 2021-04-19 NOTE — PROGRESS NOTES
Request Reference Number: [id number]. FARXIGA TAB 10MG is approved through 04/19/2022          Key: [id number] - PA Case ID: [id number] - Rx #: [id number] Need help?  Call us at [phone]   Status   Sent to [facility]   DrugFarxiga 10MG tablets   [employer] Electronic Prior Authorization Form

## 2021-04-19 NOTE — PROGRESS NOTES
Lois SLOAN Case ID: [id number] - Rx #: [id number] Need help?  Call us at [phone]   Status   Sent to Plan today   DrugGlimepiride 1MG tablets

## 2021-04-19 NOTE — PROGRESS NOTES
Key: [id number] - PA Case ID: [id number] - Rx #: [id number] Need help?  Call us at [phone]   Status   Sent to [facility]   DrugFarxiga 10MG tablets   [employer] Electronic Prior Authorization Form

## 2021-05-02 DIAGNOSIS — I48.0 PAROXYSMAL ATRIAL FIBRILLATION (HCC): ICD-10-CM

## 2021-05-03 ENCOUNTER — TELEPHONE (OUTPATIENT)
Dept: INTERNAL MEDICINE CLINIC | Age: 61
End: 2021-05-03

## 2021-05-03 RX ORDER — CARVEDILOL 25 MG/1
TABLET ORAL
Qty: 60 TAB | Refills: 0 | Status: SHIPPED
Start: 2021-05-03 | End: 2021-05-24 | Stop reason: DRUGHIGH

## 2021-05-03 NOTE — TELEPHONE ENCOUNTER
Call made to patient; no answer--LVM  to advise of providers message regarding medication refill appt needed. . Advise for a call back  To schedule appointment. Letter sent to address on file as well.

## 2021-05-05 RX ORDER — IBUPROFEN 200 MG
1 TABLET ORAL EVERY 24 HOURS
Qty: 30 PATCH | Refills: 1 | Status: SHIPPED | OUTPATIENT
Start: 2021-05-05 | End: 2021-06-04

## 2021-05-24 ENCOUNTER — OFFICE VISIT (OUTPATIENT)
Dept: INTERNAL MEDICINE CLINIC | Age: 61
End: 2021-05-24
Payer: MEDICAID

## 2021-05-24 VITALS
HEIGHT: 69 IN | TEMPERATURE: 98.3 F | SYSTOLIC BLOOD PRESSURE: 122 MMHG | OXYGEN SATURATION: 95 % | HEART RATE: 78 BPM | RESPIRATION RATE: 14 BRPM | WEIGHT: 176.6 LBS | DIASTOLIC BLOOD PRESSURE: 81 MMHG | BODY MASS INDEX: 26.16 KG/M2

## 2021-05-24 DIAGNOSIS — I10 ESSENTIAL HYPERTENSION: ICD-10-CM

## 2021-05-24 DIAGNOSIS — F32.2 CURRENT SEVERE EPISODE OF MAJOR DEPRESSIVE DISORDER WITHOUT PSYCHOTIC FEATURES WITHOUT PRIOR EPISODE (HCC): ICD-10-CM

## 2021-05-24 DIAGNOSIS — R00.2 PALPITATIONS: ICD-10-CM

## 2021-05-24 DIAGNOSIS — E11.40 TYPE 2 DIABETES MELLITUS WITH DIABETIC NEUROPATHY, WITHOUT LONG-TERM CURRENT USE OF INSULIN (HCC): Primary | ICD-10-CM

## 2021-05-24 DIAGNOSIS — E11.40 TYPE 2 DIABETES MELLITUS WITH DIABETIC NEUROPATHY, WITHOUT LONG-TERM CURRENT USE OF INSULIN (HCC): ICD-10-CM

## 2021-05-24 DIAGNOSIS — E11.9 CONTROLLED TYPE 2 DIABETES MELLITUS WITHOUT COMPLICATION, WITHOUT LONG-TERM CURRENT USE OF INSULIN (HCC): ICD-10-CM

## 2021-05-24 DIAGNOSIS — R53.83 FATIGUE, UNSPECIFIED TYPE: ICD-10-CM

## 2021-05-24 PROBLEM — R11.0 NAUSEA: Status: ACTIVE | Noted: 2021-05-24

## 2021-05-24 PROBLEM — M54.9 ACUTE BACK PAIN: Status: ACTIVE | Noted: 2021-05-24

## 2021-05-24 PROBLEM — L97.521 SKIN ULCER OF LEFT FOOT, LIMITED TO BREAKDOWN OF SKIN (HCC): Status: RESOLVED | Noted: 2020-01-10 | Resolved: 2021-05-24

## 2021-05-24 PROBLEM — L05.01 PILONIDAL CYST WITH ABSCESS: Status: ACTIVE | Noted: 2021-05-24

## 2021-05-24 PROBLEM — R06.09 DYSPNEA ON EXERTION: Status: ACTIVE | Noted: 2021-05-24

## 2021-05-24 LAB — HBA1C MFR BLD HPLC: 7.5 %

## 2021-05-24 PROCEDURE — 99215 OFFICE O/P EST HI 40 MIN: CPT | Performed by: INTERNAL MEDICINE

## 2021-05-24 PROCEDURE — 83036 HEMOGLOBIN GLYCOSYLATED A1C: CPT | Performed by: INTERNAL MEDICINE

## 2021-05-24 RX ORDER — CARVEDILOL 12.5 MG/1
12.5 TABLET ORAL 2 TIMES DAILY WITH MEALS
Qty: 60 TABLET | Refills: 1 | Status: SHIPPED | OUTPATIENT
Start: 2021-05-24 | End: 2021-08-06 | Stop reason: SDUPTHER

## 2021-05-24 RX ORDER — GLIMEPIRIDE 2 MG/1
2 TABLET ORAL 2 TIMES DAILY
Qty: 60 TABLET | Refills: 1 | Status: SHIPPED
Start: 2021-05-24 | End: 2021-09-13 | Stop reason: SDUPTHER

## 2021-05-24 NOTE — ASSESSMENT & PLAN NOTE
Noted since his hospitalization in February. Likely multifactorial.  We started depression medications last visit. However, we also increase his Coreg after his hospitalization due to palpitations. I wonder if this is contributing. Recommend decreasing Coreg back to 12.5 twice daily and see if that helps with symptoms.

## 2021-05-24 NOTE — PROGRESS NOTES
Note   Chief Complaint   Diabetes    Sammy Ren is a 62 y.o. male     History DM2 (8.5% 2/2021), CAD s/p BOBBY to ostial LM (p/w sx 2/2021, no MI), afib, PAD s/p L SFA angioplasty (Dr. Rey Mazariegos), HTN, HLD, tobacco use, depression    1. Type 2 diabetes mellitus with diabetic neuropathy, without long-term current use of insulin ([facility]  Assessment & Plan:  1/10/20 -  glipizide, metformin, gabapentin for neuropathy, statin  2/23/21 -  A1c during admission 8.5 from 7.6. Was started on lantus 4 units. Stopped glipizide due to cardiac dz, started farxiga. Advised to cont lantus and metformin  3/27/21 - poorly controlled. start glimepiride 1mg bid (pt wants to do bid). cont metformin 1000 BID, farxiga 10  =  About a week ago, he stopped his [place] and glimepiride and switch to glipizide and Metformin only. Predinner numbers did improve and prebreakfast numbers do look a little bit better on this regimen. I think he is a great responder to sulfonylureas. Continue Metformin 1000 twice a day, Farxiga 10 mg daily. Stop glipizide, increase glimepiride to 2 mg twice a day (double previous dose). May need to eventually titrate up to max dose of glimepiride  Orders:  -     AMB POC HEMOGLOBIN A1C  -     glimepiride (AMARYL) 2 mg tablet; Take 1 Tablet by mouth two (2) times a day., Normal, Disp-60 Tablet, R-1  2. Essential hypertension  Assessment & Plan:   1/10/20 - inc lisinopril to 10; coreg 12.5 BID, eliquis, asa  =  Decrease Coreg to see if that helps with his fatigue since fatigue started around the time we increased his Coreg. Continue to monitor. Orders:  -     carvediloL (COREG) 12.5 mg tablet; Take 1 Tablet by mouth two (2) times daily (with meals). Indications: high blood pressure, Normal, Disp-60 Tablet, R-1  3. Fatigue, unspecified type  Assessment & Plan:  Noted since his hospitalization in February. Likely multifactorial.  We started depression medications last visit.   However, we also increase his Coreg after his hospitalization due to palpitations. I wonder if this is contributing. Recommend decreasing Coreg back to 12.5 twice daily and see if that helps with symptoms. 4. Current severe episode of major depressive disorder without psychotic features without prior episode [facility])  Assessment & Plan:  3/27/21 - new problem. PHQ9 19.  going through divorce. started lexapro  =  No difference with Lexapro. Since we are making several other changes to his medications and because I wonder if his symptoms are multifactorial,  Recommend continuing current dose of Lexapro but will likely make changes at next visit  5. Palpitations  Assessment & Plan:  2/23/21 - increased coreg to 25 BID  3/1/21 cards Dr. Luisa Thompson - event monitor ordered  3/27/21 - premature beats on event monitor. cont coreg  =  Decreased Coreg to see if that helps with fatigue. Monitor for palpitations       Benefits, risks, possible drug interactions, and side effects of all new medications were reviewed with the patient. Pt verbalized understanding. Return to clinic: 2 weeks for diabetes medication changes, Coreg, fatigue, depression, palpitations    An electronic signature was used to authenticate this note. Vianey Alexandre MD  [facility]  5/24/2021    Future Appointments   Date Time Provider Ximena Varghese   5/4/7721  6:36 PM Andres Vanessa MD [facility] BS AMB   9/13/2021  3:15 PM Ector Cage MD [facility] BS AMB      On this date 05/24/2021 I have spent 45 minutes reviewing previous notes, test results and face to face with the patient discussing the diagnosis and importance of compliance with the treatment plan as well as documenting on the day of the visit.    Significant amount of time was spent chart reviewing and fixing his problem list  Objective   Vitals:       Visit Vitals  /81 (BP 1 Location: Left upper arm, BP Patient Position: Sitting, BP Cuff Size: Adult)   Pulse 78   Temp 98.3 °F (36.8 °C) (Oral)   Resp 14   Ht 5' 9\" (1.753 m)   Wt 176 lb 9.6 oz (80.1 kg)   SpO2 95%   BMI 26.08 kg/m²        Physical Exam  Constitutional:       Appearance: Normal appearance. He is not ill-appearing. Cardiovascular:      Rate and Rhythm: Normal rate. Pulmonary:      Effort: No respiratory distress. Neurological:      Mental Status: He is alert. Current Outpatient Medications   Medication Sig    carvediloL (COREG) 12.5 mg tablet Take 1 Tablet by mouth two (2) times daily (with meals). Indications: high blood pressure    glimepiride (AMARYL) 2 mg tablet Take 1 Tablet by mouth two (2) times a day.  nicotine (NICODERM CQ) 21 mg/24 hr 1 Patch by TransDERmal route every twenty-four (24) hours for 30 days.  escitalopram oxalate (LEXAPRO) 10 mg tablet Take 1 Tab by mouth daily. Indications: depression    tadalafiL (CIALIS) 20 mg tablet Take 20 mg by mouth daily as needed.  dapagliflozin (FARXIGA) 10 mg tab tablet Take 1 Tab by mouth daily. Indications: type 2 diabetes mellitus    Blood-Glucose Meter monitoring kit Use 1-3 times daily to monitor sugar    glucose blood VI test strips (ASCENSIA AUTODISC VI, ONE TOUCH ULTRA TEST VI) strip Use 1-3 times daily to monitor sugar    lancets misc Use 1-3 times daily to monitor sugar    alcohol swabs padm Use 1-3 times daily to monitor sugar    metFORMIN (GLUCOPHAGE) 1,000 mg tablet TAKE ONE TABLET BY MOUTH TWICE A DAY WITH MEALS    clopidogreL (PLAVIX) 75 mg tab Take 1 Tab by mouth daily.  Insulin Needles, Disposable, (Lite Touch Insulin Pen Needles) 31 gauge x 1/4\" ndle 30 Pen Needle by Does Not Apply route nightly.  terbinafine HCL (LAMISIL) 250 mg tablet Take 250 mg by mouth daily.  gabapentin (NEURONTIN) 100 mg capsule Take 2 Caps by mouth Every morning. Max Daily Amount: 200 mg.    gabapentin (NEURONTIN) 300 mg capsule Take 2 Caps by mouth nightly.  Max Daily Amount: 600 mg.    atorvastatin (LIPITOR) 40 mg tablet Take 1 Tab by mouth daily. Indications: excessive fat in the blood    lisinopriL (PRINIVIL, ZESTRIL) 10 mg tablet Take 1 Tab by mouth daily. Indications: high blood pressure    apixaban (Eliquis) 5 mg tablet Take 1 Tab by mouth two (2) times a day. Indications: treatment to prevent blood clots in chronic atrial fibrillation    multivitamin (ONE A DAY) tablet Take 1 Tab by mouth daily. No current facility-administered medications for this visit.

## 2021-05-24 NOTE — ASSESSMENT & PLAN NOTE
2/23/21 - increased coreg to 25 BID  3/1/21 cards Dr. Suzanna De Dios - event monitor ordered  3/27/21 - premature beats on event monitor. cont coreg  =  Decreased Coreg to see if that helps with fatigue.   Monitor for palpitations

## 2021-05-24 NOTE — ASSESSMENT & PLAN NOTE
2/13-2/16/21 admission for palpitations, SOB, CP  cardiac catheterization with BOBBY to ostial LM  previously offered a  CABG in 2018 but he declined at that time  Echo with normal EF

## 2021-05-24 NOTE — ASSESSMENT & PLAN NOTE
2/23/21 -  after eating in the evening (present prior to admission).  Didn't have problem during admission; smaller portion sizes in hospital. Advised smaller but more frequent meals, monitor

## 2021-05-24 NOTE — ASSESSMENT & PLAN NOTE
1/10/20 -  glipizide, metformin, gabapentin for neuropathy, statin  2/23/21 -  A1c during admission 8.5 from 7.6. Was started on lantus 4 units. Stopped glipizide due to cardiac dz, started farxiga. Advised to cont lantus and metformin  3/27/21 - poorly controlled. start glimepiride 1mg bid (pt wants to do bid). cont metformin 1000 BID, farxiga 10  =  About a week ago, he stopped his Roberth Shackleton and glimepiride and switch to glipizide and Metformin only. Predinner numbers did improve and prebreakfast numbers do look a little bit better on this regimen. I think he is a great responder to sulfonylureas. Continue Metformin 1000 twice a day, Farxiga 10 mg daily. Stop glipizide, increase glimepiride to 2 mg twice a day (double previous dose).   May need to eventually titrate up to max dose of glimepiride

## 2021-05-24 NOTE — PATIENT INSTRUCTIONS
For your diabetes: 
Continue metformin 1000mg twice a day Increase glimepiride to 2mg twice a day Stop taking farxiga For your blood pressure: 
Decrease carvedilol to 12.5mg twice a day For your depression: 
Continue lexapro for now

## 2021-05-24 NOTE — ASSESSMENT & PLAN NOTE
3/27/21 - new problem. PHQ9 19.  going through divorce. started lexapro  =  No difference with Lexapro.   Since we are making several other changes to his medications and because I wonder if his symptoms are multifactorial,  Recommend continuing current dose of Lexapro but will likely make changes at next visit

## 2021-05-24 NOTE — ASSESSMENT & PLAN NOTE
1/10/20 - wound care downstairs, bone biopsy planned  8/27/20 - Has since healed after biopsy and abx  Bone biopsy Cx 1/24/20 with pan susceptible Enterobacter  Now s/p lt SFA angioplasty.   Saw wound care downstairs  Dr. Kyle Cota with vascular

## 2021-05-24 NOTE — ASSESSMENT & PLAN NOTE
8/27/20 -  s/p lt SFA angioplasty  Dr. Eduin Espana with vascular  12/7/20 - pedal pulses difficult to palpate, slow cap refill; increased gabapentin to 200am/600pm, advised vascular follow up  BP elevated, but no other med changes

## 2021-05-24 NOTE — ASSESSMENT & PLAN NOTE
2/23/21 - after admission for CP with BOBBY to [facility]. worse than when discharged; COPD now on problem list?

## 2021-05-24 NOTE — ASSESSMENT & PLAN NOTE
1/10/20 - inc lisinopril to 10; coreg 12.5 BID, eliquis, asa  =  Decrease Coreg to see if that helps with his fatigue since fatigue started around the time we increased his Coreg. Continue to monitor.

## 2021-05-25 RX ORDER — GLIMEPIRIDE 1 MG/1
TABLET ORAL
Qty: 60 TABLET | Refills: 0 | OUTPATIENT
Start: 2021-05-25

## 2021-05-25 RX ORDER — LISINOPRIL 10 MG/1
TABLET ORAL
Qty: 30 TABLET | Refills: 1 | Status: SHIPPED | OUTPATIENT
Start: 2021-05-25 | End: 2021-07-29

## 2021-05-25 RX ORDER — ESCITALOPRAM OXALATE 10 MG/1
TABLET ORAL
Qty: 30 TABLET | Refills: 1 | Status: SHIPPED | OUTPATIENT
Start: 2021-05-25 | End: 2021-07-06 | Stop reason: SDUPTHER

## 2021-05-26 ENCOUNTER — DOCUMENTATION ONLY (OUTPATIENT)
Dept: INTERNAL MEDICINE CLINIC | Age: 61
End: 2021-05-26

## 2021-05-26 NOTE — PROGRESS NOTES
Request Reference Number: [id number]. Cruz Mabry is approved through 05/26/2022. Patient made aware to call pharmacy to fill.        PA completed via covermymeds   Key: [id number] - PA Case ID: [id number] - Rx #: [id number]  Status  Sent to Plan today

## 2021-07-01 DIAGNOSIS — I10 ESSENTIAL HYPERTENSION: ICD-10-CM

## 2021-07-01 RX ORDER — CARVEDILOL 12.5 MG/1
12.5 TABLET ORAL 2 TIMES DAILY WITH MEALS
Qty: 60 TABLET | Refills: 1 | Status: CANCELLED | OUTPATIENT
Start: 2021-07-01

## 2021-07-02 DIAGNOSIS — E11.40 TYPE 2 DIABETES MELLITUS WITH DIABETIC NEUROPATHY, WITHOUT LONG-TERM CURRENT USE OF INSULIN (HCC): ICD-10-CM

## 2021-07-02 RX ORDER — CLOPIDOGREL BISULFATE 75 MG/1
75 TABLET ORAL DAILY
Qty: 90 TABLET | Refills: 1 | Status: SHIPPED | OUTPATIENT
Start: 2021-07-02 | End: 2022-01-21

## 2021-07-02 RX ORDER — GABAPENTIN 300 MG/1
CAPSULE ORAL
Qty: 60 CAPSULE | Refills: 2 | Status: SHIPPED | OUTPATIENT
Start: 2021-07-02 | End: 2021-10-14

## 2021-07-06 ENCOUNTER — OFFICE VISIT (OUTPATIENT)
Dept: INTERNAL MEDICINE CLINIC | Age: 61
End: 2021-07-06
Payer: COMMERCIAL

## 2021-07-06 VITALS
SYSTOLIC BLOOD PRESSURE: 116 MMHG | HEART RATE: 95 BPM | RESPIRATION RATE: 14 BRPM | OXYGEN SATURATION: 94 % | TEMPERATURE: 98.2 F | WEIGHT: 177 LBS | BODY MASS INDEX: 26.22 KG/M2 | DIASTOLIC BLOOD PRESSURE: 75 MMHG | HEIGHT: 69 IN

## 2021-07-06 DIAGNOSIS — R53.83 FATIGUE, UNSPECIFIED TYPE: ICD-10-CM

## 2021-07-06 DIAGNOSIS — E11.40 TYPE 2 DIABETES MELLITUS WITH DIABETIC NEUROPATHY, WITHOUT LONG-TERM CURRENT USE OF INSULIN (HCC): Primary | ICD-10-CM

## 2021-07-06 DIAGNOSIS — F32.2 CURRENT SEVERE EPISODE OF MAJOR DEPRESSIVE DISORDER WITHOUT PSYCHOTIC FEATURES WITHOUT PRIOR EPISODE (HCC): ICD-10-CM

## 2021-07-06 DIAGNOSIS — Z72.0 TOBACCO USE: ICD-10-CM

## 2021-07-06 PROCEDURE — 99214 OFFICE O/P EST MOD 30 MIN: CPT | Performed by: INTERNAL MEDICINE

## 2021-07-06 RX ORDER — GLIMEPIRIDE 4 MG/1
4 TABLET ORAL 2 TIMES DAILY
Qty: 60 TABLET | Refills: 1 | Status: SHIPPED | OUTPATIENT
Start: 2021-07-06 | End: 2021-09-29

## 2021-07-06 RX ORDER — TADALAFIL 20 MG/1
TABLET ORAL
Qty: 15 TABLET | Refills: 4 | Status: SHIPPED | OUTPATIENT
Start: 2021-07-06 | End: 2022-03-29 | Stop reason: SDUPTHER

## 2021-07-06 RX ORDER — ESCITALOPRAM OXALATE 20 MG/1
20 TABLET ORAL DAILY
Qty: 30 TABLET | Refills: 1 | Status: SHIPPED | OUTPATIENT
Start: 2021-07-06 | End: 2021-09-29

## 2021-07-06 NOTE — PROGRESS NOTES
Note   Chief Complaint   Fatigue    Temo Pérez is a 62 y.o. male     1. Type 2 diabetes mellitus with diabetic neuropathy, without long-term current use of insulin ([facility])  Assessment & Plan:  Morning glucose ranging 180150s. Predinner glucose 851 80s. Patient reports he is not really eating during the day. Increase to glimepiride 2 mg in the morning and 4 mg in the evening for 2 weeks and increase to 4 mg twice a day. Advised to call if any low blood sugars. Continue Metformin 1000 twice a day and Farxiga at 10 mg daily. Advised to eat more regular meals. Orders:  -     glimepiride (AMARYL) 4 mg tablet; Take 1 Tablet by mouth two (2) times a day., Normal, Disp-60 Tablet, R-1  2. Current severe episode of major depressive disorder without psychotic features without prior episode [facility])  Assessment & Plan:  PHQ-9 score of 16 today. Recommend increasing Lexapro to 20 mg  Orders:  -     escitalopram oxalate (LEXAPRO) 20 mg tablet; Take 1 Tablet by mouth daily. , Normal, Disp-30 Tablet, R-1  3. Fatigue, unspecified type  Assessment & Plan:  No significant improvements from last visit. Did not notice a difference when decreasing his carvedilol. Reports poor sleep over the past 6 months, wakes up every 2 hours. Sleeps better when he is sleeping in his recliner. He is wondering if related to decreased smoking. Denies apnea, significant snoring. Unclear etiology, try to address other issues including possible depression. Consider rechecking labs including CBC if still no significant improvement. He is not interested in sleep medications. 4. Tobacco use  Assessment & Plan:  Has cut down significantly on his smoking       Benefits, risks, possible drug interactions, and side effects of all new medications were reviewed with the patient. Pt verbalized understanding. Return to clinic: 1 month for Lexapro, diabetes, fatigue    An electronic signature was used to authenticate this note.   Imelda Stearns Mikayla Collier MD  [facility]  7/6/2021    Future Appointments   Date Time Provider Ximena Varghese   9/3/0997  1:58 PM Jorje Alejandra MD [facility] BS AMB   9/13/2021  3:15 PM Roberto Guan MD [facility] BS AMB        Objective   Vitals:       Visit Vitals  /75 (BP 1 Location: Left upper arm, BP Patient Position: Sitting, BP Cuff Size: Adult)   Pulse 95   Temp 98.2 °F (36.8 °C) (Oral)   Resp 14   Ht 5' 9\" (1.753 m)   Wt 177 lb (80.3 kg)   SpO2 94%   BMI 26.14 kg/m²        Physical Exam  Constitutional:       Appearance: Normal appearance. He is not ill-appearing. Cardiovascular:      Rate and Rhythm: Normal rate and regular rhythm. Heart sounds: No murmur heard. No friction rub. No gallop. Pulmonary:      Effort: No respiratory distress. Breath sounds: Normal breath sounds. No wheezing, rhonchi or rales. Neurological:      Mental Status: He is alert. Current Outpatient Medications   Medication Sig    tadalafiL (CIALIS) 20 mg tablet TAKE ONE TABLET BY MOUTH DAILY AS NEEDED FOR ERECTILE DYSFUNCTION    escitalopram oxalate (LEXAPRO) 20 mg tablet Take 1 Tablet by mouth daily.  glimepiride (AMARYL) 4 mg tablet Take 1 Tablet by mouth two (2) times a day.  clopidogreL (PLAVIX) 75 mg tab Take 1 Tablet by mouth daily.  gabapentin (NEURONTIN) 300 mg capsule TAKE TWO CAPSULES BY MOUTH ONCE NIGHTLY    atorvastatin (LIPITOR) 40 mg tablet TAKE ONE TABLET BY MOUTH DAILY    gabapentin (NEURONTIN) 100 mg capsule TAKE TWO CAPSULES BY MOUTH EVERY MORNING    lisinopriL (PRINIVIL, ZESTRIL) 10 mg tablet TAKE ONE TABLET BY MOUTH DAILY    carvediloL (COREG) 12.5 mg tablet Take 1 Tablet by mouth two (2) times daily (with meals). Indications: high blood pressure    glimepiride (AMARYL) 2 mg tablet Take 1 Tablet by mouth two (2) times a day.  dapagliflozin (FARXIGA) 10 mg tab tablet Take 1 Tab by mouth daily.  Indications: type 2 diabetes mellitus    Blood-Glucose Meter monitoring kit Use 1-3 times daily to monitor sugar    glucose blood VI test strips (ASCENSIA AUTODISC VI, ONE TOUCH ULTRA TEST VI) strip Use 1-3 times daily to monitor sugar    lancets misc Use 1-3 times daily to monitor sugar    alcohol swabs padm Use 1-3 times daily to monitor sugar    metFORMIN (GLUCOPHAGE) 1,000 mg tablet TAKE ONE TABLET BY MOUTH TWICE A DAY WITH MEALS    Insulin Needles, Disposable, (Lite Touch Insulin Pen Needles) 31 gauge x 1/4\" ndle 30 Pen Needle by Does Not Apply route nightly.  terbinafine HCL (LAMISIL) 250 mg tablet Take 250 mg by mouth daily.  apixaban (Eliquis) 5 mg tablet Take 1 Tab by mouth two (2) times a day. Indications: treatment to prevent blood clots in chronic atrial fibrillation    multivitamin (ONE A DAY) tablet Take 1 Tab by mouth daily. No current facility-administered medications for this visit.

## 2021-07-06 NOTE — PATIENT INSTRUCTIONS
Increase lexapro to 20mg daily   For your glimeperide - increase your evening dose to 4mg and continue your morning dose at 2mg; after 2 weeks, increase your morning dose to 4mg (So you're taking 4mg twice a day)    Ketotifen allergy eye drop

## 2021-07-06 NOTE — ASSESSMENT & PLAN NOTE
Morning glucose ranging 180150s. Predinner glucose 851 80s. Patient reports he is not really eating during the day. Increase to glimepiride 2 mg in the morning and 4 mg in the evening for 2 weeks and increase to 4 mg twice a day. Advised to call if any low blood sugars. Continue Metformin 1000 twice a day and Farxiga at 10 mg daily. Advised to eat more regular meals.

## 2021-07-06 NOTE — ASSESSMENT & PLAN NOTE
No significant improvements from last visit. Did not notice a difference when decreasing his carvedilol. Reports poor sleep over the past 6 months, wakes up every 2 hours. Sleeps better when he is sleeping in his recliner. He is wondering if related to decreased smoking. Denies apnea, significant snoring. Unclear etiology, try to address other issues including possible depression. Consider rechecking labs including CBC if still no significant improvement. He is not interested in sleep medications.

## 2021-07-12 DIAGNOSIS — E11.40 TYPE 2 DIABETES MELLITUS WITH DIABETIC NEUROPATHY, WITHOUT LONG-TERM CURRENT USE OF INSULIN (HCC): ICD-10-CM

## 2021-07-12 RX ORDER — GABAPENTIN 100 MG/1
CAPSULE ORAL
Qty: 60 CAPSULE | Refills: 0 | Status: SHIPPED | OUTPATIENT
Start: 2021-07-15 | End: 2021-09-29

## 2021-07-12 RX ORDER — GABAPENTIN 100 MG/1
CAPSULE ORAL
Qty: 60 CAPSULE | Refills: 0 | Status: SHIPPED | OUTPATIENT
Start: 2021-08-14 | End: 2021-08-23

## 2021-07-12 RX ORDER — GABAPENTIN 100 MG/1
CAPSULE ORAL
Qty: 60 CAPSULE | Refills: 0 | Status: SHIPPED
Start: 2021-09-13 | End: 2021-09-13 | Stop reason: SDUPTHER

## 2021-07-28 DIAGNOSIS — E11.9 CONTROLLED TYPE 2 DIABETES MELLITUS WITHOUT COMPLICATION, WITHOUT LONG-TERM CURRENT USE OF INSULIN (HCC): ICD-10-CM

## 2021-07-28 DIAGNOSIS — E11.40 TYPE 2 DIABETES MELLITUS WITH DIABETIC NEUROPATHY, WITHOUT LONG-TERM CURRENT USE OF INSULIN (HCC): ICD-10-CM

## 2021-07-29 RX ORDER — LISINOPRIL 10 MG/1
TABLET ORAL
Qty: 30 TABLET | Refills: 2 | Status: SHIPPED | OUTPATIENT
Start: 2021-07-29 | End: 2021-11-15

## 2021-07-29 RX ORDER — DAPAGLIFLOZIN 10 MG/1
TABLET, FILM COATED ORAL
Qty: 30 TABLET | Refills: 2 | Status: SHIPPED | OUTPATIENT
Start: 2021-07-29 | End: 2021-11-29

## 2021-08-06 ENCOUNTER — OFFICE VISIT (OUTPATIENT)
Dept: INTERNAL MEDICINE CLINIC | Age: 61
End: 2021-08-06
Payer: COMMERCIAL

## 2021-08-06 VITALS
HEIGHT: 69 IN | TEMPERATURE: 97.8 F | WEIGHT: 177.8 LBS | BODY MASS INDEX: 26.33 KG/M2 | RESPIRATION RATE: 14 BRPM | HEART RATE: 76 BPM | DIASTOLIC BLOOD PRESSURE: 84 MMHG | OXYGEN SATURATION: 97 % | SYSTOLIC BLOOD PRESSURE: 142 MMHG

## 2021-08-06 DIAGNOSIS — E11.40 TYPE 2 DIABETES MELLITUS WITH DIABETIC NEUROPATHY, WITHOUT LONG-TERM CURRENT USE OF INSULIN (HCC): Primary | ICD-10-CM

## 2021-08-06 DIAGNOSIS — F32.2 CURRENT SEVERE EPISODE OF MAJOR DEPRESSIVE DISORDER WITHOUT PSYCHOTIC FEATURES WITHOUT PRIOR EPISODE (HCC): ICD-10-CM

## 2021-08-06 DIAGNOSIS — I10 ESSENTIAL HYPERTENSION: ICD-10-CM

## 2021-08-06 DIAGNOSIS — R53.83 FATIGUE, UNSPECIFIED TYPE: ICD-10-CM

## 2021-08-06 PROCEDURE — 99214 OFFICE O/P EST MOD 30 MIN: CPT | Performed by: INTERNAL MEDICINE

## 2021-08-06 RX ORDER — CARVEDILOL 25 MG/1
25 TABLET ORAL 2 TIMES DAILY WITH MEALS
Qty: 180 TABLET | Refills: 1 | Status: SHIPPED | OUTPATIENT
Start: 2021-08-06 | End: 2022-03-10

## 2021-08-06 NOTE — ASSESSMENT & PLAN NOTE
Reports that he thinks this may be related to not being as active as he was due to the pandemic. Continue to monitor.  Agree with increasing activity

## 2021-08-06 NOTE — PROGRESS NOTES
Note   Chief Complaint   DM    Kalen Wilson is a 62 y.o. male     1. Type 2 diabetes mellitus with diabetic neuropathy, without long-term current use of insulin ([facility])  Assessment & Plan:  Morning glucose still elevated. Start Januvia 25 mg daily. Continue glimepiride 4 mg twice a day, Metformin 1000 twice a day, and Farxiga 10 mg daily. Orders:  -     SITagliptin (JANUVIA) 25 mg tablet; Take 1 Tablet by mouth nightly. Indications: type 2 diabetes mellitus, Normal, Disp-30 Tablet, R-1  2. Essential hypertension  Assessment & Plan:  Mildly elevated today but patient reports he did not take his medications this morning. We had decreased his carvedilol due to fatigue but he did not notice a significant change and we decreased it. Recommend increasing Coreg back to 25 mg twice a day for optimization. Otherwise continue lisinopril 10 mg  Orders:  -     carvediloL (COREG) 25 mg tablet; Take 1 Tablet by mouth two (2) times daily (with meals). Indications: high blood pressure, Normal, Disp-180 Tablet, R-1  3. Current severe episode of major depressive disorder without psychotic features without prior episode [facility])  Assessment & Plan:  Feels that his depression medication is at a good level and not interested in changing. Continue Lexapro 20 mg daily, no changes recommended  4. Fatigue, unspecified type  Assessment & Plan:  Reports that he thinks this may be related to not being as active as he was due to the pandemic. Continue to monitor. Agree with increasing activity       Benefits, risks, possible drug interactions, and side effects of all new medications were reviewed with the patient. Pt verbalized understanding. Return to clinic: Jimmy Dumas    An electronic signature was used to authenticate this note.   Bartolo Fraire MD  [facility]  8/6/2021    Future Appointments   Date Time Provider Ximena Solimani   2/2/2660  7:30 PM Shilpi Mina MD [facility] BS AMB   9/13/2021  3:15 PM Rodrigue Caballero MD [facility] BS AMB        Objective   Vitals:       Visit Vitals  BP (!) 142/84 (BP 1 Location: Left upper arm, BP Patient Position: Sitting, BP Cuff Size: Adult)   Pulse 76   Temp 97.8 °F (36.6 °C) (Oral)   Resp 14   Ht 5' 9\" (1.753 m)   Wt 177 lb 12.8 oz (80.6 kg)   SpO2 97%   BMI 26.26 kg/m²        Physical Exam  Constitutional:       Appearance: Normal appearance. He is not ill-appearing. Cardiovascular:      Rate and Rhythm: Normal rate and regular rhythm. Heart sounds: No murmur heard. No friction rub. No gallop. Pulmonary:      Effort: No respiratory distress. Breath sounds: Normal breath sounds. No wheezing, rhonchi or rales. Neurological:      Mental Status: He is alert. Current Outpatient Medications   Medication Sig    carvediloL (COREG) 25 mg tablet Take 1 Tablet by mouth two (2) times daily (with meals). Indications: high blood pressure    SITagliptin (JANUVIA) 25 mg tablet Take 1 Tablet by mouth nightly. Indications: type 2 diabetes mellitus    Farxiga 10 mg tab tablet TAKE ONE TABLET BY MOUTH DAILY    lisinopriL (PRINIVIL, ZESTRIL) 10 mg tablet TAKE ONE TABLET BY MOUTH DAILY    gabapentin (NEURONTIN) 100 mg capsule TAKE TWO CAPSULES BY MOUTH EVERY MORNING    [START ON 8/14/2021] gabapentin (NEURONTIN) 100 mg capsule TAKE TWO CAPSULES BY MOUTH EVERY MORNING    [START ON 9/13/2021] gabapentin (NEURONTIN) 100 mg capsule TAKE TWO CAPSULES BY MOUTH EVERY MORNING    tadalafiL (CIALIS) 20 mg tablet TAKE ONE TABLET BY MOUTH DAILY AS NEEDED FOR ERECTILE DYSFUNCTION    escitalopram oxalate (LEXAPRO) 20 mg tablet Take 1 Tablet by mouth daily.  glimepiride (AMARYL) 4 mg tablet Take 1 Tablet by mouth two (2) times a day.  clopidogreL (PLAVIX) 75 mg tab Take 1 Tablet by mouth daily.     gabapentin (NEURONTIN) 300 mg capsule TAKE TWO CAPSULES BY MOUTH ONCE NIGHTLY    atorvastatin (LIPITOR) 40 mg tablet TAKE ONE TABLET BY MOUTH DAILY  glimepiride (AMARYL) 2 mg tablet Take 1 Tablet by mouth two (2) times a day.  Blood-Glucose Meter monitoring kit Use 1-3 times daily to monitor sugar    glucose blood VI test strips (ASCENSIA AUTODISC VI, ONE TOUCH ULTRA TEST VI) strip Use 1-3 times daily to monitor sugar    lancets misc Use 1-3 times daily to monitor sugar    alcohol swabs padm Use 1-3 times daily to monitor sugar    metFORMIN (GLUCOPHAGE) 1,000 mg tablet TAKE ONE TABLET BY MOUTH TWICE A DAY WITH MEALS    Insulin Needles, Disposable, (Lite Touch Insulin Pen Needles) 31 gauge x 1/4\" ndle 30 Pen Needle by Does Not Apply route nightly.  terbinafine HCL (LAMISIL) 250 mg tablet Take 250 mg by mouth daily.  apixaban (Eliquis) 5 mg tablet Take 1 Tab by mouth two (2) times a day. Indications: treatment to prevent blood clots in chronic atrial fibrillation    multivitamin (ONE A DAY) tablet Take 1 Tab by mouth daily. No current facility-administered medications for this visit.

## 2021-08-06 NOTE — PATIENT INSTRUCTIONS
Continue taking metformin and glimeperide twice a day  Take farxiga and sitagliptan Kameron Espino) at night  Increase your carvedilol to 25mg twice a day

## 2021-08-06 NOTE — ASSESSMENT & PLAN NOTE
Morning glucose still elevated. Start Januvia 25 mg daily. Continue glimepiride 4 mg twice a day, Metformin 1000 twice a day, and Farxiga 10 mg daily.

## 2021-08-06 NOTE — ASSESSMENT & PLAN NOTE
Feels that his depression medication is at a good level and not interested in changing.   Continue Lexapro 20 mg daily, no changes recommended

## 2021-08-06 NOTE — LETTER
2021 9:14 AM    Mr. Estuardo Delacruz  [address] [address]        To whom it may concern,    The above patient Estuardo Delacruz  [birth year] is at high risk for COVID and complications from [place].       Sincerely,      Rod Lopez MD

## 2021-08-06 NOTE — ASSESSMENT & PLAN NOTE
Mildly elevated today but patient reports he did not take his medications this morning. We had decreased his carvedilol due to fatigue but he did not notice a significant change and we decreased it. Recommend increasing Coreg back to 25 mg twice a day for optimization.  Otherwise continue lisinopril 10 mg

## 2021-08-21 DIAGNOSIS — E11.40 TYPE 2 DIABETES MELLITUS WITH DIABETIC NEUROPATHY, WITHOUT LONG-TERM CURRENT USE OF INSULIN (HCC): ICD-10-CM

## 2021-08-23 RX ORDER — GABAPENTIN 100 MG/1
CAPSULE ORAL
Qty: 60 CAPSULE | Refills: 0 | Status: SHIPPED
Start: 2021-08-23 | End: 2021-09-13 | Stop reason: SDUPTHER

## 2021-09-06 DIAGNOSIS — I48.0 PAROXYSMAL ATRIAL FIBRILLATION (HCC): ICD-10-CM

## 2021-09-09 ENCOUNTER — DOCUMENTATION ONLY (OUTPATIENT)
Dept: INTERNAL MEDICINE CLINIC | Age: 61
End: 2021-09-09

## 2021-09-09 NOTE — PROGRESS NOTES
PA completed via covermymeds--awaiting insurance response  Key: [id number] - PA Case ID: [id number] - Rx #: [id number]  Status  Sent to Plan today  Drug  Eliquis 5MG tablets      Key: [id number] - PA Case ID: [id number] - Rx #: [id number]  Status  Sent to [facility]  Drug  Januvia 25MG tablets

## 2021-09-13 ENCOUNTER — OFFICE VISIT (OUTPATIENT)
Dept: CARDIOLOGY CLINIC | Age: 61
End: 2021-09-13
Payer: COMMERCIAL

## 2021-09-13 VITALS
RESPIRATION RATE: 18 BRPM | BODY MASS INDEX: 26.9 KG/M2 | HEIGHT: 69 IN | HEART RATE: 81 BPM | SYSTOLIC BLOOD PRESSURE: 104 MMHG | WEIGHT: 181.6 LBS | OXYGEN SATURATION: 97 % | DIASTOLIC BLOOD PRESSURE: 74 MMHG

## 2021-09-13 DIAGNOSIS — I48.0 PAROXYSMAL ATRIAL FIBRILLATION (HCC): ICD-10-CM

## 2021-09-13 DIAGNOSIS — E78.5 HYPERLIPIDEMIA, UNSPECIFIED HYPERLIPIDEMIA TYPE: ICD-10-CM

## 2021-09-13 DIAGNOSIS — I10 ESSENTIAL HYPERTENSION: ICD-10-CM

## 2021-09-13 DIAGNOSIS — I25.10 CORONARY ARTERY DISEASE INVOLVING NATIVE CORONARY ARTERY OF NATIVE HEART WITHOUT ANGINA PECTORIS: Primary | ICD-10-CM

## 2021-09-13 PROCEDURE — 99214 OFFICE O/P EST MOD 30 MIN: CPT | Performed by: INTERNAL MEDICINE

## 2021-09-13 PROCEDURE — 93000 ELECTROCARDIOGRAM COMPLETE: CPT | Performed by: INTERNAL MEDICINE

## 2021-09-13 NOTE — LETTER
9/13/2021    Patient: Margaret Abraham   YOB: 1960   Date of Visit: 9/13/2021     Desmond Ye, [address] [place]  [address]  [address] [place] [address]  Via [facility] [address]    Dear Desmond Ye MD,      Thank you for referring Mr. Margaret Abraham to [address] [place] [address] Kathy for evaluation. My notes for this consultation are attached. If you have questions, please do not hesitate to call me. I look forward to following your patient along with you.       Sincerely,    Barbie Santizo MD

## 2021-09-13 NOTE — PROGRESS NOTES
Subjective/HPI:     Aparna Richard is a 62 y.o. male is here for a f/u appt. PMHx significant for CAD, type 2 diabetes with neuropathy, hypertension, hyperlipidemia, history of PAF, active tobacco use. Last seen in our office 3/1/21. He denies CP, SOB/COCHRAN, orthopnea, PND or edema. Denies palpitations, lightheadedness or syncope. He wants to have the Watchman device implanted. PCP Provider  Gilmar Morley MD  Past Medical History:   Diagnosis Date    [facility]rial fibrillation [facility] [address]     CAD (coronary artery disease)     Chronic obstructive pulmonary disease ([address]     Diabetes ([facility] [address]     Essential hypertension     Hyperlipidemia     Long term current use of anticoagulant therapy       Past Surgical History:   Procedure Laterality Date    HX ANGIOPLASTY      lt SFA angioplasty.  HX CORONARY STENT PLACEMENT  02/15/2021    BOBBY/PCI to Ostial LM POD     HX HEART CATHETERIZATION  02/15/2021    HX HEENT      Right ear bone removal    HX OTHER SURGICAL      Left vascular surgery, Clot removal?    HX VASCULAR STENT      VASCULAR SURGERY PROCEDURE UNLIST       Allergies   Allergen Reactions    Penicillin G Unknown (comments)     When he was a young child Mother told him he was allergic      Family History   Problem Relation Age of Onset    Heart Disease Mother         triple bypass     Cancer Sister     Heart Disease Sister     Diabetes Brother     Cancer Sister       Current Outpatient Medications   Medication Sig    apixaban (Eliquis) 5 mg tablet Take 1 Tablet by mouth two (2) times a day. Indications: treatment to prevent blood clots in chronic atrial fibrillation    carvediloL (COREG) 25 mg tablet Take 1 Tablet by mouth two (2) times daily (with meals). Indications: high blood pressure    SITagliptin (JANUVIA) 25 mg tablet Take 1 Tablet by mouth nightly.  Indications: type 2 diabetes mellitus    Farxiga 10 mg tab tablet TAKE ONE TABLET BY MOUTH DAILY    lisinopriL (PRINIVIL, ZESTRIL) 10 mg tablet TAKE ONE TABLET BY MOUTH DAILY    gabapentin (NEURONTIN) 100 mg capsule TAKE TWO CAPSULES BY MOUTH EVERY MORNING    tadalafiL (CIALIS) 20 mg tablet TAKE ONE TABLET BY MOUTH DAILY AS NEEDED FOR ERECTILE DYSFUNCTION    escitalopram oxalate (LEXAPRO) 20 mg tablet Take 1 Tablet by mouth daily.  glimepiride (AMARYL) 4 mg tablet Take 1 Tablet by mouth two (2) times a day.  clopidogreL (PLAVIX) 75 mg tab Take 1 Tablet by mouth daily.  gabapentin (NEURONTIN) 300 mg capsule TAKE TWO CAPSULES BY MOUTH ONCE NIGHTLY    atorvastatin (LIPITOR) 40 mg tablet TAKE ONE TABLET BY MOUTH DAILY    Blood-Glucose Meter monitoring kit Use 1-3 times daily to monitor sugar    glucose blood VI test strips (ASCENSIA AUTODISC VI, ONE TOUCH ULTRA TEST VI) strip Use 1-3 times daily to monitor sugar    lancets misc Use 1-3 times daily to monitor sugar    alcohol swabs padm Use 1-3 times daily to monitor sugar    metFORMIN (GLUCOPHAGE) 1,000 mg tablet TAKE ONE TABLET BY MOUTH TWICE A DAY WITH MEALS    Insulin Needles, Disposable, (Lite Touch Insulin Pen Needles) 31 gauge x 1/4\" ndle 30 Pen Needle by Does Not Apply route nightly.  terbinafine HCL (LAMISIL) 250 mg tablet Take 250 mg by mouth daily.  multivitamin (ONE A DAY) tablet Take 1 Tab by mouth daily. No current facility-administered medications for this visit.       Vitals:    09/13/21 1533   BP: 104/74   Pulse: 81   Resp: 18   SpO2: 97%   Weight: 181 lb 9.6 oz (82.4 kg)   Height: 5' 9\" (1.753 m)     Social History     Socioeconomic History    Marital status:      Spouse name: Not on file    Number of children: Not on file    Years of education: Not on file    Highest education level: Not on file   Occupational History    Not on file   Tobacco Use    Smoking status: Current Every Day Smoker     Years: 45.00     Types: Cigarettes    Smokeless tobacco: Never Used    Tobacco comment: 10 cigarettes daily   Vaping Use    Vaping Use: Never used   Substance and Sexual Activity    Alcohol use: Not Currently     Comment: quit 39 years    Drug use: Not Currently    Sexual activity: Yes     Partners: Female   Other Topics Concern     Service Not Asked    Blood Transfusions Not Asked    Caffeine Concern Not Asked    Occupational Exposure Not Asked    Hobby Hazards Not Asked    Sleep Concern Not Asked    Stress Concern Not Asked    Weight Concern Not Asked    Special Diet Not Asked    Back Care Not Asked    Exercise Not Asked    Bike Helmet Not Asked    Seat Belt Not Asked    Self-Exams Not Asked   Social History Narrative    ** Merged History Encounter **          Social Determinants of Health     Financial Resource Strain:     Difficulty of Paying Living Expenses:    Food Insecurity:     Worried About Running Out of Food in the Last Year:     [address] in the Last Year:    Transportation Needs:     Lack of Transportation (Medical):  Lack of Transportation (Non-Medical):    Physical Activity:     Days of Exercise per Week:     Minutes of Exercise per Session:    Stress:     Feeling of Stress :    Social Connections:     Frequency of Communication with Friends and Family:     Frequency of Social Gatherings with Friends and Family:     Attends Confucianism Services:     Active Member of Clubs or Organizations:     Attends Club or Organization Meetings:     Marital Status:    Intimate Partner Violence:     Fear of Current or Ex-Partner:     Emotionally Abused:     Physically Abused:     Sexually Abused:        I have reviewed the nurses notes, vitals, problem list, allergy list, medical history, family, social history and medications. Review of Symptoms:    General: Pt denies excessive weight gain or loss. Pt is able to conduct ADL's  HEENT: Denies blurred vision, headaches, epistaxis and difficulty swallowing.   Respiratory: +episode of shortness of breath, denies COCHRAN, wheezing or stridor. Cardiovascular: Denies precordial pain, palpitations, denies edema or PND  Gastrointestinal: Denies poor appetite, indigestion, abdominal pain or blood in stool  Urinary: Denies dysuria, pyuria  Musculoskeletal: Denies pain or swelling from muscles or joints  Neurologic: Denies tremor, paresthesias, or sensory motor disturbance  Skin: Denies rash, itching or texture change. Psych: Denies depression        Physical Exam:      General: Well developed, in no acute distress, cooperative and alert  HEENT: No carotid bruits, no JVD, trach is midline. Neck Supple, PEERL, EOM intact. Heart:  Normal S1/S2 negative S3 or S4. Regular, no murmur, gallop or rub. Respiratory: Clear bilaterally x 4, no wheezing or rales  Abdomen:   Soft, non-tender, no masses, bowel sounds are active. Extremities:  No edema, normal cap refill, no cyanosis, atraumatic. Neuro: A&Ox3, speech clear, gait stable. Skin: Skin color is normal. No rashes or lesions.  Non diaphoretic  Vascular: 2+ pulses symmetric in all extremities    Cardiographics    ECG: SR HR 78    Results for orders placed or performed during the hospital encounter of 02/13/21   EKG, 12 LEAD, INITIAL   Result Value Ref Range    Ventricular Rate 91 BPM    Atrial Rate 91 BPM    P-R Interval 144 ms    QRS Duration 90 ms    Q-T Interval 354 ms    QTC Calculation (Bezet) 435 ms    Calculated P Axis 38 degrees    Calculated R Axis 9 degrees    Calculated T Axis 51 degrees    Diagnosis       Normal sinus rhythm  Possible Inferior infarct , age undetermined  Poor R-wave Progression (consider lead placement or loss of anterior forces)  Confirmed by Scottie Vilchis MD, Vinay Garcia ([id number]) on 2/14/2021 3:52:41 PM           Cardiology Labs:  Lab Results   Component Value Date/Time    Cholesterol, total 86 02/14/2021 03:27 AM    HDL Cholesterol 26 02/14/2021 03:27 AM    LDL, calculated 5.6 02/14/2021 03:27 AM    LDL, calculated 49.6 01/10/2020 03:57 PM    VLDL, calculated 54.4 02/14/2021 03:27 AM    CHOL/HDL Ratio 3.3 02/14/2021 03:27 AM     Lab Results   Component Value Date/Time    Sodium 140 02/16/2021 04:19 AM    Potassium 3.8 02/16/2021 04:19 AM    Chloride 110 (H) 02/16/2021 04:19 AM    CO2 24 02/16/2021 04:19 AM    Glucose 217 (H) 02/16/2021 04:19 AM    BUN 13 02/16/2021 04:19 AM    Creatinine 1.14 02/16/2021 04:19 AM    BUN/Creatinine ratio 11 (L) 02/16/2021 04:19 AM    GFR est AA >60 02/16/2021 04:19 AM    GFR est non-AA >60 02/16/2021 04:19 AM    Calcium 8.6 02/16/2021 04:19 AM    Anion gap 6 02/16/2021 04:19 AM    Bilirubin, total 0.3 02/13/2021 06:27 AM    ALT (SGPT) 18 02/13/2021 06:27 AM    Alk. phosphatase 65 02/13/2021 06:27 AM    Protein, total 6.8 02/13/2021 06:27 AM    Albumin 3.7 02/13/2021 06:27 AM    Globulin 3.1 02/13/2021 06:27 AM    A-G Ratio 1.2 02/13/2021 06:27 AM     Lab Results   Component Value Date/Time    Hemoglobin A1c 8.5 (H) 02/13/2021 11:39 AM    Hemoglobin A1c (POC) 7.5 05/24/2021 01:21 PM        Assessment:     Assessment:     Diagnoses and all orders for this visit:    1. Coronary artery disease involving native coronary artery of native heart without angina pectoris    2. Essential hypertension  -     AMB POC EKG ROUTINE W/ 12 LEADS, INTER & REP    3. Paroxysmal atrial fibrillation (HCC)  -     AMB POC EKG ROUTINE W/ 12 LEADS, INTER & REP    4. Hyperlipidemia, unspecified hyperlipidemia type  -     AMB POC EKG ROUTINE W/ 12 LEADS, INTER & REP        ICD-10-CM ICD-9-CM    1. Coronary artery disease involving native coronary artery of native heart without angina pectoris  I25.10 414.01    2. Essential hypertension  I10 401.9 AMB POC EKG ROUTINE W/ 12 LEADS, INTER & REP   3. Paroxysmal atrial fibrillation (HCC)  I48.0 427.31 AMB POC EKG ROUTINE W/ 12 LEADS, INTER & REP   4. Hyperlipidemia, unspecified hyperlipidemia type  E78.5 272.4 AMB POC EKG ROUTINE W/ 12 LEADS, INTER & REP          Plan:     1.  Coronary artery disease involving native coronary artery of native heart with unstable angina pectoris [facility])  Cardiac cath 2/15/21 with Ost LM lesion with significant FFR, s/p PCI/BOBBY. Echo 2/13 showed preserved EF, no valvular disease of significance. Today he denies angina or anginal equivalent symptoms. EKG SR without significant changes noted. Continue Coreg, statin, and Plavix for 1 yr post stent placement. Restart ASA once Plavix is completed. 2. Essential hypertension  BP at goal. Continue current anti hypertensive regimen-Coreg 25mg bid, Lisinopril 10mg    3. Hyperlipidemia  On Lipitor 40mg. VLDL 54 2/14/21. Lipids and labs managed by PCP. 4. Paroxysmal atrial fibrillation (HCC)  Hx of afib [id number] timeframe at [facility]. Denies palpitations or other symptoms concerning for recurrence. He wore an event monitor 3/16/21 which showed no afib. EKG SR. He is asking about Watchman. No issues with bleeding,anemia or intolerance to DOAC. Not a candidate at this time. Cont on BB and Eliquis 5mg bid. 5. Tobacco use  Not willing to quit at this time.     F/U in 6 months, sooner frantz Johnson, NP

## 2021-09-13 NOTE — PROGRESS NOTES
1. Have you been to the ER, urgent care clinic since your last visit? Hospitalized since your last visit? No.    2. Have you seen or consulted any other health care providers outside of the [address] [place] [address] since your last visit? Include any pap smears or colon screening.    No.        Chief Complaint   Patient presents with    Coronary Artery Disease     6 month- pt denies any cardiac symptoms- wants to discuss the Watchman procedure

## 2021-09-28 DIAGNOSIS — F32.2 CURRENT SEVERE EPISODE OF MAJOR DEPRESSIVE DISORDER WITHOUT PSYCHOTIC FEATURES WITHOUT PRIOR EPISODE (HCC): ICD-10-CM

## 2021-09-28 DIAGNOSIS — E11.40 TYPE 2 DIABETES MELLITUS WITH DIABETIC NEUROPATHY, WITHOUT LONG-TERM CURRENT USE OF INSULIN (HCC): ICD-10-CM

## 2021-09-29 RX ORDER — ESCITALOPRAM OXALATE 20 MG/1
20 TABLET ORAL DAILY
Qty: 30 TABLET | Refills: 3 | Status: SHIPPED | OUTPATIENT
Start: 2021-09-29 | End: 2022-02-18

## 2021-09-29 RX ORDER — GLIMEPIRIDE 4 MG/1
4 TABLET ORAL 2 TIMES DAILY
Qty: 60 TABLET | Refills: 3 | Status: SHIPPED | OUTPATIENT
Start: 2021-09-29 | End: 2022-02-18

## 2021-09-29 RX ORDER — GABAPENTIN 100 MG/1
CAPSULE ORAL
Qty: 60 CAPSULE | Refills: 2 | Status: SHIPPED | OUTPATIENT
Start: 2021-09-29 | End: 2022-01-21

## 2021-10-05 DIAGNOSIS — E11.9 CONTROLLED TYPE 2 DIABETES MELLITUS WITHOUT COMPLICATION, WITHOUT LONG-TERM CURRENT USE OF INSULIN (HCC): ICD-10-CM

## 2021-10-05 RX ORDER — PEN NEEDLE, DIABETIC 29 G X1/2"
30 NEEDLE, DISPOSABLE MISCELLANEOUS
Qty: 30 PEN NEEDLE | Refills: 0 | OUTPATIENT
Start: 2021-10-05

## 2021-10-05 RX ORDER — LANCETS
EACH MISCELLANEOUS
Qty: 100 EACH | Refills: 11 | Status: SHIPPED | OUTPATIENT
Start: 2021-10-05

## 2021-10-13 DIAGNOSIS — E11.40 TYPE 2 DIABETES MELLITUS WITH DIABETIC NEUROPATHY, WITHOUT LONG-TERM CURRENT USE OF INSULIN (HCC): ICD-10-CM

## 2021-10-14 RX ORDER — SITAGLIPTIN 25 MG/1
TABLET, FILM COATED ORAL
Qty: 30 TABLET | Refills: 1 | Status: SHIPPED | OUTPATIENT
Start: 2021-10-14 | End: 2022-01-04

## 2021-10-14 RX ORDER — GABAPENTIN 300 MG/1
CAPSULE ORAL
Qty: 60 CAPSULE | Refills: 3 | Status: SHIPPED | OUTPATIENT
Start: 2021-10-14 | End: 2022-02-28

## 2021-11-05 DIAGNOSIS — E11.40 TYPE 2 DIABETES MELLITUS WITH DIABETIC NEUROPATHY, WITHOUT LONG-TERM CURRENT USE OF INSULIN (HCC): Primary | ICD-10-CM

## 2021-11-05 RX ORDER — BLOOD-GLUCOSE METER
EACH MISCELLANEOUS
Qty: 1 EACH | Refills: 0 | Status: SHIPPED | OUTPATIENT
Start: 2021-11-05

## 2021-11-05 RX ORDER — BLOOD SUGAR DIAGNOSTIC
STRIP MISCELLANEOUS
Qty: 100 STRIP | Refills: 3 | Status: SHIPPED | OUTPATIENT
Start: 2021-11-05 | End: 2021-11-24 | Stop reason: SDUPTHER

## 2021-11-10 ENCOUNTER — DOCUMENTATION ONLY (OUTPATIENT)
Dept: INTERNAL MEDICINE CLINIC | Age: 61
End: 2021-11-10

## 2021-11-10 NOTE — PROGRESS NOTES
Called patient to inform him that pcp sent in coumtour next monitor and test strips patient was thankful.

## 2021-11-12 DIAGNOSIS — E11.9 CONTROLLED TYPE 2 DIABETES MELLITUS WITHOUT COMPLICATION, WITHOUT LONG-TERM CURRENT USE OF INSULIN (HCC): ICD-10-CM

## 2021-11-15 RX ORDER — LISINOPRIL 10 MG/1
10 TABLET ORAL DAILY
Qty: 30 TABLET | Refills: 1 | Status: SHIPPED | OUTPATIENT
Start: 2021-11-15 | End: 2022-01-21

## 2021-11-24 DIAGNOSIS — E11.40 TYPE 2 DIABETES MELLITUS WITH DIABETIC NEUROPATHY, WITHOUT LONG-TERM CURRENT USE OF INSULIN (HCC): ICD-10-CM

## 2021-11-24 RX ORDER — BLOOD SUGAR DIAGNOSTIC
STRIP MISCELLANEOUS
Qty: 100 STRIP | Refills: 11 | Status: SHIPPED
Start: 2021-11-24 | End: 2022-01-28

## 2021-11-24 NOTE — TELEPHONE ENCOUNTER
Patient called in to state he needs his prescription for test strips updated to 3 times a day instead of 1. He had switched insurance and then switched back and in the process his prescription got messed up.

## 2021-12-02 ENCOUNTER — TELEPHONE (OUTPATIENT)
Dept: INTERNAL MEDICINE CLINIC | Age: 61
End: 2021-12-02

## 2021-12-02 NOTE — TELEPHONE ENCOUNTER
PSR lvm letting patient know an appointment was needed before he could get anymore refills. PSR advised patient to call back to be scheduled.

## 2021-12-30 ENCOUNTER — TELEPHONE (OUTPATIENT)
Dept: INTERNAL MEDICINE CLINIC | Age: 61
End: 2021-12-30

## 2021-12-30 NOTE — TELEPHONE ENCOUNTER
----- Message from Moise Duyen sent at 12/30/2021 11:14 AM EST -----  Subject: Referral Request    QUESTIONS   Reason for referral request? Pt requesting A1C check on his next appt. Has the physician seen you for this condition before? No   Preferred Specialist (if applicable)? Jamee Reasons  Do you already have an appointment scheduled? Yes  Select Scheduled Date? 2022-01-21  Select Scheduled Physician? Thean Reasons   Additional Information for Provider?   ---------------------------------------------------------------------------  --------------  Moose GILMORE  What is the best way for the office to contact you? OK to leave message on   voicemail  Preferred Call Back Phone Number?  [phone]

## 2022-01-03 DIAGNOSIS — E11.40 TYPE 2 DIABETES MELLITUS WITH DIABETIC NEUROPATHY, WITHOUT LONG-TERM CURRENT USE OF INSULIN (HCC): ICD-10-CM

## 2022-01-04 DIAGNOSIS — E11.40 TYPE 2 DIABETES MELLITUS WITH DIABETIC NEUROPATHY, WITHOUT LONG-TERM CURRENT USE OF INSULIN (HCC): ICD-10-CM

## 2022-01-04 RX ORDER — SITAGLIPTIN 25 MG/1
TABLET, FILM COATED ORAL
Qty: 30 TABLET | Refills: 1 | Status: SHIPPED | OUTPATIENT
Start: 2022-01-04 | End: 2022-03-02 | Stop reason: SDUPTHER

## 2022-01-21 DIAGNOSIS — E11.9 CONTROLLED TYPE 2 DIABETES MELLITUS WITHOUT COMPLICATION, WITHOUT LONG-TERM CURRENT USE OF INSULIN (HCC): ICD-10-CM

## 2022-01-21 DIAGNOSIS — E11.40 TYPE 2 DIABETES MELLITUS WITH DIABETIC NEUROPATHY, WITHOUT LONG-TERM CURRENT USE OF INSULIN (HCC): ICD-10-CM

## 2022-01-21 RX ORDER — CLOPIDOGREL BISULFATE 75 MG/1
TABLET ORAL
Qty: 90 TABLET | Refills: 3 | Status: SHIPPED | OUTPATIENT
Start: 2022-01-21 | End: 2022-01-31 | Stop reason: SDUPTHER

## 2022-01-21 RX ORDER — GABAPENTIN 100 MG/1
CAPSULE ORAL
Qty: 60 CAPSULE | Refills: 1 | Status: SHIPPED | OUTPATIENT
Start: 2022-01-21 | End: 2022-03-10

## 2022-01-21 RX ORDER — LISINOPRIL 10 MG/1
10 TABLET ORAL DAILY
Qty: 90 TABLET | Refills: 3 | Status: SHIPPED | OUTPATIENT
Start: 2022-01-21 | End: 2022-03-29 | Stop reason: SDUPTHER

## 2022-01-28 ENCOUNTER — OFFICE VISIT (OUTPATIENT)
Dept: INTERNAL MEDICINE CLINIC | Age: 62
End: 2022-01-28

## 2022-01-28 VITALS
SYSTOLIC BLOOD PRESSURE: 113 MMHG | WEIGHT: 188 LBS | RESPIRATION RATE: 14 BRPM | HEART RATE: 90 BPM | OXYGEN SATURATION: 97 % | TEMPERATURE: 97.6 F | DIASTOLIC BLOOD PRESSURE: 78 MMHG | HEIGHT: 69 IN | BODY MASS INDEX: 27.85 KG/M2

## 2022-01-28 DIAGNOSIS — Z00.00 WELL ADULT EXAM: Primary | ICD-10-CM

## 2022-01-28 DIAGNOSIS — R19.7 DIARRHEA, UNSPECIFIED TYPE: ICD-10-CM

## 2022-01-28 DIAGNOSIS — Z12.11 SCREENING FOR COLON CANCER: ICD-10-CM

## 2022-01-28 DIAGNOSIS — I25.110 CORONARY ARTERY DISEASE INVOLVING NATIVE CORONARY ARTERY OF NATIVE HEART WITH UNSTABLE ANGINA PECTORIS (HCC): ICD-10-CM

## 2022-01-28 DIAGNOSIS — H04.203 WATERY EYES: ICD-10-CM

## 2022-01-28 DIAGNOSIS — E11.51 TYPE 2 DIABETES MELLITUS WITH PERIPHERAL VASCULAR DISEASE (HCC): ICD-10-CM

## 2022-01-28 DIAGNOSIS — I10 ESSENTIAL HYPERTENSION: ICD-10-CM

## 2022-01-28 DIAGNOSIS — E11.40 TYPE 2 DIABETES MELLITUS WITH DIABETIC NEUROPATHY, WITHOUT LONG-TERM CURRENT USE OF INSULIN (HCC): ICD-10-CM

## 2022-01-28 DIAGNOSIS — Z23 NEEDS FLU SHOT: ICD-10-CM

## 2022-01-28 DIAGNOSIS — Z12.5 PROSTATE CANCER SCREENING: ICD-10-CM

## 2022-01-28 PROCEDURE — 99396 PREV VISIT EST AGE 40-64: CPT | Performed by: INTERNAL MEDICINE

## 2022-01-28 PROCEDURE — 90686 IIV4 VACC NO PRSV 0.5 ML IM: CPT | Performed by: INTERNAL MEDICINE

## 2022-01-28 PROCEDURE — 99214 OFFICE O/P EST MOD 30 MIN: CPT | Performed by: INTERNAL MEDICINE

## 2022-01-28 NOTE — ASSESSMENT & PLAN NOTE
Follow-up with cardiology to discuss likely switch from aspirin to Plavix (1 year after stent placement 2/15/2021)

## 2022-01-28 NOTE — ASSESSMENT & PLAN NOTE
Stomach feels hard  Ongoing for the past month but maybe started have [place] and [place]  Now 3-4 times a day  Watery, occl explosive   No abx, no new meds prior to onset  No abd pain  No fevers, chills   occl normal stools  Unclear etiology. Possibly started after starting [place] so rec stopping [place] for 2 weeks. If symptoms improve, will need to adjust dm meds. If no improvement, consider stool studies.   GI referral provided - due for colonoscopy anyway  Of note, he is also on Lamisil

## 2022-01-28 NOTE — PATIENT INSTRUCTIONS
Stop ruth annuvia for 2 weeks then call me and let me know if the diarrhea is the same or better  Vaccine Information Statement    Influenza (Flu) Vaccine (Inactivated or Recombinant): What You Need to Know    Many vaccine information statements are available in Amharic and other languages. See www.immunize.org/vis. Hojas de información sobre vacunas están disponibles en español y en muchos otros idiomas. Visite www.immunize.org/vis. 1. Why get vaccinated? Influenza vaccine can prevent influenza (flu). Flu is a contagious disease that spreads around the [facility] every year, usually between October and May. Anyone can get the flu, but it is more dangerous for some people. Infants and young children, people 72 years and older, pregnant people, and people with certain health conditions or a weakened immune system are at greatest risk of flu complications. Pneumonia, bronchitis, sinus infections, and ear infections are examples of flu-related complications. If you have a medical condition, such as heart disease, cancer, or diabetes, flu can make it worse. Flu can cause fever and chills, sore throat, muscle aches, fatigue, cough, headache, and runny or stuffy nose. Some people may have vomiting and diarrhea, though this is more common in children than adults. In an average year, thousands of people in the [facility] die from flu, and many more are hospitalized. Flu vaccine prevents millions of illnesses and flu-related visits to the doctor each year. 2. Influenza vaccines     CDC recommends everyone 6 months and older get vaccinated every flu season. Children 6 months through 6years of age may need 2 doses during a single flu season. Everyone else needs only 1 dose each flu season. It takes about 2 weeks for protection to develop after vaccination. There are many flu viruses, and they are always changing.  Each year a new flu vaccine is made to protect against the influenza viruses believed to be likely to cause disease in the upcoming flu season. Even when the vaccine doesnt exactly match these viruses, it may still provide some protection. Influenza vaccine does not cause flu. Influenza vaccine may be given at the same time as other vaccines. 3. Talk with your health care provider    Tell your vaccination provider if the person getting the vaccine:   Has had an allergic reaction after a previous dose of influenza vaccine, or has any severe, life-threatening allergies    Has ever had Guillain-Barré Syndrome (also called GBS)    In some cases, your health care provider may decide to postpone influenza vaccination until a future visit. Influenza vaccine can be administered at any time during pregnancy. People who are or will be pregnant during influenza season should receive inactivated influenza vaccine. People with minor illnesses, such as a cold, may be vaccinated. People who are moderately or severely ill should usually wait until they recover before getting influenza vaccine. Your health care provider can give you more information. 4. Risks of a vaccine reaction     Soreness, redness, and swelling where the shot is given, fever, muscle aches, and headache can happen after influenza vaccination.  There may be a very small increased risk of Guillain-Barré Syndrome (GBS) after inactivated influenza vaccine (the flu shot). [facility] children who get the flu shot along with pneumococcal vaccine (PCV13) and/or DTaP vaccine at the same time might be slightly more likely to have a seizure caused by fever. Tell your health care provider if a child who is getting flu vaccine has ever had a seizure. People sometimes faint after medical procedures, including vaccination. Tell your provider if you feel dizzy or have vision changes or ringing in the ears.     As with any medicine, there is a very remote chance of a vaccine causing a severe allergic reaction, other serious injury, or death. 5. What if there is a serious problem? An allergic reaction could occur after the vaccinated person leaves the clinic. If you see signs of a severe allergic reaction (hives, swelling of the face and throat, difficulty breathing, a fast heartbeat, dizziness, or weakness), call 9-1-1 and get the person to the nearest hospital.    For other signs that concern you, call your health care provider. Adverse reactions should be reported to the Vaccine Adverse Event Reporting System (VAERS). Your health care provider will usually file this report, or you can do it yourself. Visit the VAERS website at www.vaers. [facility].gov or call 6-454.922.4747. VAERS is only for reporting reactions, and VAERS staff members do not give medical advice. 6. The National Vaccine Injury Compensation Program    The [facility] Vaccine Injury Compensation Program (VICP) is a federal program that was created to compensate people who may have been injured by certain vaccines. Claims regarding alleged injury or death due to vaccination have a time limit for filing, which may be as short as two years. Visit the VICP website at www.[facility].gov/vaccinecompensation or call 2-885.681.7224 to learn about the program and about filing a claim. 7. How can I learn more?  Ask your health care provider.  Call your local or state health department.  Visit the website of the Food and Drug Administration (FDA) for vaccine package inserts and additional information at www.fda.gov/vaccines-blood-biologics/vaccines.  Contact the Centers for Disease Control and Prevention (CDC):  - Call 3-550.436.3863 (1-800-CDC-INFO) or  - Visit CDCs influenza website at www.cdc.gov/flu. Vaccine Information Statement   Inactivated Influenza Vaccine   8/6/2021  42 LIBBYMark Hope [id number]-[id number]   Department of Health and Human Services  Centers for Disease Control and Prevention    Office Use Only

## 2022-01-28 NOTE — ASSESSMENT & PLAN NOTE
Sugar running 180s200s in the morning but 90s100s before dinner. Started having diarrhea possibly after starting Januvia. See diarrhea note. Recommend stopping Januvia for 2 weeks. If diarrhea improves, we discussed possibly starting GLP-1. If diarrhea does not improve, then recommend continuing Januvia. Otherwise continue Farxiga 10 mg, glimepiride 4 mg, Metformin 1000 twice a day.   Has been on Metformin for a long time  Has seen an eye doctor in the past year

## 2022-01-28 NOTE — ASSESSMENT & PLAN NOTE
Weepy Left eye  Over the past month  No pain, not itchy  Have to pull eyelid open when waking up  Ketotifen drops don't help  No purulent drainage  No conjunctivitis noted on exam. rec warm compresses couple times a day with massage

## 2022-01-28 NOTE — PROGRESS NOTES
Assessment and Plan     1. Well adult exam  Assessment & Plan:  Referral for colonoscopy provided  Encourage healthy habits  2. Type 2 diabetes mellitus with diabetic neuropathy, without long-term current use of insulin ([facility])  Assessment & Plan:  Sugar running 180s200s in the morning but 90s100s before dinner. Started having diarrhea possibly after starting Januvia. See diarrhea note. Recommend stopping Januvia for 2 weeks. If diarrhea improves, we discussed possibly starting GLP-1. If diarrhea does not improve, then recommend continuing Januvia. Otherwise continue Farxiga 10 mg, glimepiride 4 mg, Metformin 1000 twice a day. Has been on Metformin for a long time  Has seen an eye doctor in the past year  Orders:  -     glucose blood VI test strips (ASCENSIA AUTODISC VI, ONE TOUCH ULTRA TEST VI) strip; Check sugar 3 times daily for diabetes, Normal, Disp-100 Strip, R-11  -     MICROALBUMIN, UR, RAND W/ MICROALB/CREAT RATIO; Future  -     CBC W/O DIFF; Future  -     METABOLIC PANEL, COMPREHENSIVE; Future  -     HEMOGLOBIN A1C WITH EAG; Future  -     LIPID PANEL; Future  3. Prostate cancer screening  -     PSA W/ REFLX FREE PSA; Future  4. Screening for colon cancer  -     REFERRAL TO GASTROENTEROLOGY  5. Needs flu shot  -     INFLUENZA VIRUS VAC QUAD,SPLIT,PRESV FREE SYRINGE IM  6. Diarrhea, unspecified type  Assessment & Plan:   Stomach feels hard  Ongoing for the past month but maybe started have [place]  Now 3-4 times a day  Watery, occl explosive   No abx, no new meds prior to onset  No abd pain  No fevers, chills   occl normal stools  Unclear etiology. Possibly started after starting [place] so rec stopping [place] for 2 weeks. If symptoms improve, will need to adjust dm meds. If no improvement, consider stool studies. GI referral provided - due for colonoscopy anyway  Of note, he is also on Lamisil  7.  Watery eyes  Assessment & Plan:  Weepy Left eye  Over the past month  No pain, not itchy  Have to pull eyelid open when waking up  Ketotifen drops don't help  No purulent drainage  No conjunctivitis noted on exam. rec warm compresses couple times a day with massage  8. Essential hypertension  Assessment & Plan:   at goal, continue current medications  9. Coronary artery disease involving native coronary artery of native heart with unstable angina pectoris [facility])  Assessment & Plan:  Follow-up with cardiology to discuss likely switch from aspirin to Plavix (1 year after stent placement 2/15/2021)  10. Type 2 diabetes mellitus with peripheral vascular disease ([facility] [address]  Assessment & Plan:  See other diabetes note       Benefits, risks, possible drug interactions, and side effects of all new medications were reviewed with the patient. Pt verbalized understanding. Return to clinic: 3 months for diabetes or earlier if changes need to be made    An electronic signature was used to authenticate this note. Queenie Capps MD  [facility]  1/28/2022    Future Appointments   Date Time Provider Ximena Varghese   3/4/2022  3:00 PM Pee Lynne MD [facility] BS AMB        History of Present Illness   Chief Complaint   Physical    Wei Bahrdwaj is a 62 y.o. male         Review of Systems   Constitutional: Negative for chills and fever. HENT: Negative for hearing loss. Eyes: Negative for blurred vision. Respiratory: Negative for shortness of breath. Cardiovascular: Negative for chest pain. Gastrointestinal: Positive for diarrhea. Negative for abdominal pain, blood in stool, constipation, melena, nausea and vomiting. Genitourinary: Negative for dysuria and hematuria. Musculoskeletal: Negative for joint pain. Skin: Negative for rash. Neurological: Negative for headaches.         Past Medical History     Allergies   Allergen Reactions    Penicillin G Unknown (comments)     When he was a young child Mother told him he was allergic        Current Outpatient Medications Medication Sig    glucose blood VI test strips (ASCENSIA AUTODISC VI, ONE TOUCH ULTRA TEST VI) strip Check sugar 3 times daily for diabetes    gabapentin (NEURONTIN) 100 mg capsule TAKE 2 CAPSULES BY MOUTH EVERY MORNING    clopidogreL (PLAVIX) 75 mg tab TAKE ONE TABLET BY MOUTH DAILY    lisinopriL (PRINIVIL, ZESTRIL) 10 mg tablet Take 1 Tablet by mouth daily. Indications: high blood pressure    Januvia 25 mg tablet TAKE 1 TABLET BY MOUTH NIGHTLY    dapagliflozin (Farxiga) 10 mg tab tablet Take 1 Tablet by mouth daily. Indications: type 2 diabetes mellitus    Blood-Glucose Meter (Contour Next Meter) misc Use 1-4 times daily to measure glucose    gabapentin (NEURONTIN) 300 mg capsule TAKE TWO CAPSULES BY MOUTH EVERY NIGHT    lancets misc Use 1-3 times daily to monitor sugar    escitalopram oxalate (LEXAPRO) 20 mg tablet Take 1 Tablet by mouth daily. Indications: depression    glimepiride (AMARYL) 4 mg tablet Take 1 Tablet by mouth two (2) times a day. Indications: type 2 diabetes mellitus    apixaban (Eliquis) 5 mg tablet Take 1 Tablet by mouth two (2) times a day. Indications: treatment to prevent blood clots in chronic atrial fibrillation    carvediloL (COREG) 25 mg tablet Take 1 Tablet by mouth two (2) times daily (with meals). Indications: high blood pressure    tadalafiL (CIALIS) 20 mg tablet TAKE ONE TABLET BY MOUTH DAILY AS NEEDED FOR ERECTILE DYSFUNCTION    atorvastatin (LIPITOR) 40 mg tablet TAKE ONE TABLET BY MOUTH DAILY    alcohol swabs padm Use 1-3 times daily to monitor sugar    metFORMIN (GLUCOPHAGE) 1,000 mg tablet TAKE ONE TABLET BY MOUTH TWICE A DAY WITH MEALS    terbinafine HCL (LAMISIL) 250 mg tablet Take 250 mg by mouth daily.  multivitamin (ONE A DAY) tablet Take 1 Tab by mouth daily.  Insulin Needles, Disposable, (Lite Touch Insulin Pen Needles) 31 gauge x 1/4\" ndle 30 Pen Needle by Does Not Apply route nightly.  (Patient not taking: Reported on 1/28/2022)     No current facility-administered medications for this visit. Patient Active Problem List   Diagnosis Code    Paroxysmal atrial fibrillation ([facility]) I48.0    Essential hypertension I10    Erectile dysfunction, unspecified erectile dysfunction type N52.9    Hyperlipidemia, unspecified hyperlipidemia type E78.5    Skin ulcer of left foot, limited to breakdown of skin ([facility] [address] L97.521    Tobacco use Z72.0    Bilateral hearing loss, unspecified hearing loss type H91.93    Easy bruising R23.8    Peripheral arterial disease ([facility]) I73.9    Chronic obstructive pulmonary disease ([facility]) J44.9    Type 2 diabetes mellitus with diabetic neuropathy ([facility]) E11.40    Type 2 diabetes mellitus with peripheral vascular disease ([facility]) E11.51    Coronary artery disease involving native coronary artery of native heart with unstable angina pectoris ([facility]) I25.110    S/P cardiac cath Z98.890    Current severe episode of major depressive disorder without psychotic features without prior episode ([facility]) F32.2    Fatigue R53.83    Pilonidal cyst with abscess L05.01    Dyspnea on exertion R06.00    Palpitations R00.2    Nausea R11.0    Acute back pain M54.9    Diarrhea R19.7    Watery eyes H04.203    Well adult exam Z00.00     Past Surgical History:   Procedure Laterality Date    HX ANGIOPLASTY      lt SFA angioplasty.     HX CORONARY STENT PLACEMENT  02/15/2021    BOBBY/PCI to Ostial LM POD     HX HEART CATHETERIZATION  02/15/2021    HX HEENT      Right ear bone removal    HX OTHER SURGICAL      Left vascular surgery, Clot removal?    HX VASCULAR STENT      VASCULAR SURGERY PROCEDURE UNLIST        Social History     Tobacco Use    Smoking status: Current Every Day Smoker     Years: 45.00     Types: Cigarettes    Smokeless tobacco: Never Used    Tobacco comment: 10 cigarettes daily   Substance Use Topics    Alcohol use: Not Currently     Comment: quit 39 years      Family History   Problem Relation Age of Onset    Heart Disease Mother         triple bypass     Cancer Sister     Heart Disease Sister     Diabetes Brother     Cancer Sister         Physical Exam   Vitals:       Visit Vitals  /78 (BP 1 Location: Left upper arm, BP Patient Position: Sitting, BP Cuff Size: Adult)   Pulse 90   Temp 97.6 °F (36.4 °C) (Temporal)   Resp 14   Ht 5' 9\" (1.753 m)   Wt 188 lb (85.3 kg)   SpO2 97%   BMI 27.76 kg/m²        Physical Exam  Constitutional:       General: He is not in acute distress. Appearance: He is well-developed. HENT:      Right Ear: Tympanic membrane, ear canal and external ear normal.      Left Ear: Tympanic membrane, ear canal and external ear normal.   Eyes:      General: No scleral icterus. Right eye: No discharge. Left eye: No discharge. Extraocular Movements: Extraocular movements intact. Conjunctiva/sclera: Conjunctivae normal.   Neck:      Vascular: No carotid bruit. Cardiovascular:      Rate and Rhythm: Normal rate and regular rhythm. Pulses: Normal pulses. Heart sounds: No murmur heard. No friction rub. No gallop. Pulmonary:      Effort: No respiratory distress. Breath sounds: No wheezing, rhonchi or rales. Abdominal:      General: Bowel sounds are normal. There is no distension. Palpations: Abdomen is soft. There is no hepatomegaly, splenomegaly or mass. Tenderness: There is no abdominal tenderness. There is no guarding. Musculoskeletal:      Cervical back: Neck supple. Right lower leg: No edema. Left lower leg: No edema. Lymphadenopathy:      Cervical: No cervical adenopathy. Skin:     General: Skin is warm. Findings: No rash. Neurological:      Mental Status: He is alert.

## 2022-02-01 RX ORDER — CLOPIDOGREL BISULFATE 75 MG/1
75 TABLET ORAL DAILY
Qty: 90 TABLET | Refills: 1 | Status: SHIPPED | OUTPATIENT
Start: 2022-02-01 | End: 2022-03-29 | Stop reason: SDUPTHER

## 2022-02-04 DIAGNOSIS — I48.0 PAROXYSMAL ATRIAL FIBRILLATION (HCC): ICD-10-CM

## 2022-02-04 DIAGNOSIS — E11.40 TYPE 2 DIABETES MELLITUS WITH DIABETIC NEUROPATHY, WITHOUT LONG-TERM CURRENT USE OF INSULIN (HCC): ICD-10-CM

## 2022-02-17 ENCOUNTER — DOCUMENTATION ONLY (OUTPATIENT)
Dept: INTERNAL MEDICINE CLINIC | Age: 62
End: 2022-02-17

## 2022-02-17 NOTE — PROGRESS NOTES
PA attempted via cover my meds. Nurse was given this message by patient insurance. Tiffany Allen (Diallo: Linus Corea)  Eliquis 5MG tablets   Form  [employer] Electronic Prior Authorization Form   Message from Plan  We received a prior authorization request for the member and product listed above.  The [facility] Prior Authorization Team is not able to review this request because requested medication does not require prior authorization

## 2022-02-18 DIAGNOSIS — E11.9 CONTROLLED TYPE 2 DIABETES MELLITUS WITHOUT COMPLICATION, WITHOUT LONG-TERM CURRENT USE OF INSULIN (HCC): ICD-10-CM

## 2022-02-18 DIAGNOSIS — F32.2 CURRENT SEVERE EPISODE OF MAJOR DEPRESSIVE DISORDER WITHOUT PSYCHOTIC FEATURES WITHOUT PRIOR EPISODE (HCC): ICD-10-CM

## 2022-02-18 DIAGNOSIS — E11.40 TYPE 2 DIABETES MELLITUS WITH DIABETIC NEUROPATHY, WITHOUT LONG-TERM CURRENT USE OF INSULIN (HCC): ICD-10-CM

## 2022-02-18 RX ORDER — ESCITALOPRAM OXALATE 20 MG/1
TABLET ORAL
Qty: 30 TABLET | Refills: 3 | Status: SHIPPED | OUTPATIENT
Start: 2022-02-18 | End: 2022-03-29 | Stop reason: SDUPTHER

## 2022-02-18 RX ORDER — METFORMIN HYDROCHLORIDE 1000 MG/1
TABLET ORAL
Qty: 60 TABLET | Refills: 2 | Status: SHIPPED | OUTPATIENT
Start: 2022-02-18 | End: 2022-03-29 | Stop reason: SDUPTHER

## 2022-02-18 RX ORDER — GLIMEPIRIDE 4 MG/1
TABLET ORAL
Qty: 60 TABLET | Refills: 3 | Status: SHIPPED | OUTPATIENT
Start: 2022-02-18 | End: 2022-03-29 | Stop reason: SDUPTHER

## 2022-03-01 ENCOUNTER — DOCUMENTATION ONLY (OUTPATIENT)
Dept: INTERNAL MEDICINE CLINIC | Age: 62
End: 2022-03-01

## 2022-03-01 NOTE — PROGRESS NOTES
PA completed by nurse via covermymeds. PA pending insurance approval please see below for PA key.    KeyValentino Meter - PA Case ID: [id number] - Rx #: [id number]  Status  Sent to [facility]  Drug  OneTouch Ultra strips

## 2022-03-01 NOTE — PROGRESS NOTES
PA approved   Key: [id number] - PA Case ID: [id number] - Rx #: [id number]  Outcome  Approvedtoday  Request Reference Number: [id number]. [employer] is approved through 03/01/2023        PA completed by nurse via covermymeds. PA pending insurance approval please see below for PA richter.    Eren SLOAN Case ID: [id number] - Rx #: [id number]  Status  Sent to [facility]  Drug  OneTouch Ultra strips

## 2022-03-02 ENCOUNTER — TELEPHONE (OUTPATIENT)
Dept: INTERNAL MEDICINE CLINIC | Age: 62
End: 2022-03-02

## 2022-03-02 DIAGNOSIS — E11.40 TYPE 2 DIABETES MELLITUS WITH DIABETIC NEUROPATHY, WITHOUT LONG-TERM CURRENT USE OF INSULIN (HCC): ICD-10-CM

## 2022-03-02 NOTE — TELEPHONE ENCOUNTER
Patient called to state he believes the diarrhea was not the result of Januvia and he has restarted it as his blood sugar was too high without it. He would like to know if the doctor can refill it for him as he is almost out. Patient also states he needs a prior authorization done for his test strips as insurance said they have not received a prior auth yet for it. Please call back and advise. Patient states he is moving back to [place] in April. PSR has scheduled him an appointment next week so that he can discuss his medications and any other health issues before leaving.

## 2022-03-04 DIAGNOSIS — E11.40 TYPE 2 DIABETES MELLITUS WITH DIABETIC NEUROPATHY, WITHOUT LONG-TERM CURRENT USE OF INSULIN (HCC): ICD-10-CM

## 2022-03-07 ENCOUNTER — TELEPHONE (OUTPATIENT)
Dept: INTERNAL MEDICINE CLINIC | Age: 62
End: 2022-03-07

## 2022-03-07 DIAGNOSIS — E11.40 TYPE 2 DIABETES MELLITUS WITH DIABETIC NEUROPATHY, WITHOUT LONG-TERM CURRENT USE OF INSULIN (HCC): ICD-10-CM

## 2022-03-07 NOTE — TELEPHONE ENCOUNTER
Patient farxiga 10mg does not requires a PA. Patient will need to call his insurance to opt out of the mail order so [facility] can fill medications. Patient aware.

## 2022-03-07 NOTE — TELEPHONE ENCOUNTER
The [employer] called to state a prior authorization needs to be submitted due to patient exceeding the refill limitations of his plan. The prior authorization is for his [facility] [place] medication and the number to call is [phone]. Patient is completely out of medication.

## 2022-03-08 NOTE — TELEPHONE ENCOUNTER
Nurse completed patient PA for Farxiga medication. PA approved pa case number [id number] approved to 3/8/2023. Nurse confirmed with pharmacy and it has went through with patient insurance. Patient informed and was thankful for the call.

## 2022-03-10 DIAGNOSIS — I10 ESSENTIAL HYPERTENSION: ICD-10-CM

## 2022-03-10 DIAGNOSIS — E11.40 TYPE 2 DIABETES MELLITUS WITH DIABETIC NEUROPATHY, WITHOUT LONG-TERM CURRENT USE OF INSULIN (HCC): ICD-10-CM

## 2022-03-10 RX ORDER — LANCETS 33 GAUGE
EACH MISCELLANEOUS
Qty: 100 EACH | Refills: 11 | Status: SHIPPED | OUTPATIENT
Start: 2022-03-10 | End: 2022-03-29 | Stop reason: SDUPTHER

## 2022-03-10 RX ORDER — CARVEDILOL 25 MG/1
TABLET ORAL
Qty: 180 TABLET | Refills: 3 | Status: SHIPPED | OUTPATIENT
Start: 2022-03-10 | End: 2022-03-29 | Stop reason: SDUPTHER

## 2022-03-10 RX ORDER — GABAPENTIN 100 MG/1
CAPSULE ORAL
Qty: 60 CAPSULE | Refills: 2 | Status: SHIPPED | OUTPATIENT
Start: 2022-03-18 | End: 2022-03-29 | Stop reason: SDUPTHER

## 2022-03-18 PROBLEM — E78.5 HYPERLIPIDEMIA, UNSPECIFIED HYPERLIPIDEMIA TYPE: Status: ACTIVE | Noted: 2020-01-10

## 2022-03-18 PROBLEM — F32.2 CURRENT SEVERE EPISODE OF MAJOR DEPRESSIVE DISORDER WITHOUT PSYCHOTIC FEATURES WITHOUT PRIOR EPISODE (HCC): Status: ACTIVE | Noted: 2021-03-27

## 2022-03-18 PROBLEM — I10 ESSENTIAL HYPERTENSION: Status: ACTIVE | Noted: 2020-01-10

## 2022-03-18 PROBLEM — E11.51 TYPE 2 DIABETES MELLITUS WITH PERIPHERAL VASCULAR DISEASE (HCC): Status: ACTIVE | Noted: 2020-12-07

## 2022-03-18 PROBLEM — Z72.0 TOBACCO USE: Status: ACTIVE | Noted: 2020-01-10

## 2022-03-18 PROBLEM — I25.110 CORONARY ARTERY DISEASE INVOLVING NATIVE CORONARY ARTERY OF NATIVE HEART WITH UNSTABLE ANGINA PECTORIS (HCC): Status: ACTIVE | Noted: 2021-02-14

## 2022-03-18 PROBLEM — R23.3 EASY BRUISING: Status: ACTIVE | Noted: 2020-08-27

## 2022-03-18 PROBLEM — H04.203 WATERY EYES: Status: ACTIVE | Noted: 2022-01-28

## 2022-03-18 PROBLEM — E11.40 TYPE 2 DIABETES MELLITUS WITH DIABETIC NEUROPATHY (HCC): Status: ACTIVE | Noted: 2020-12-07

## 2022-03-19 PROBLEM — I73.9 PERIPHERAL ARTERIAL DISEASE (HCC): Status: ACTIVE | Noted: 2020-08-27

## 2022-03-19 PROBLEM — R11.0 NAUSEA: Status: ACTIVE | Noted: 2021-05-24

## 2022-03-19 PROBLEM — R00.2 PALPITATIONS: Status: ACTIVE | Noted: 2021-05-24

## 2022-03-19 PROBLEM — R19.7 DIARRHEA: Status: ACTIVE | Noted: 2022-01-28

## 2022-03-19 PROBLEM — L97.521 SKIN ULCER OF LEFT FOOT, LIMITED TO BREAKDOWN OF SKIN (HCC): Status: ACTIVE | Noted: 2020-01-10

## 2022-03-19 PROBLEM — N52.9 ERECTILE DYSFUNCTION, UNSPECIFIED ERECTILE DYSFUNCTION TYPE: Status: ACTIVE | Noted: 2020-01-10

## 2022-03-19 PROBLEM — Z00.00 WELL ADULT EXAM: Status: ACTIVE | Noted: 2022-01-28

## 2022-03-19 PROBLEM — Z98.890 S/P CARDIAC CATH: Status: ACTIVE | Noted: 2021-02-15

## 2022-03-19 PROBLEM — M54.9 ACUTE BACK PAIN: Status: ACTIVE | Noted: 2021-05-24

## 2022-03-19 PROBLEM — H91.93 BILATERAL HEARING LOSS, UNSPECIFIED HEARING LOSS TYPE: Status: ACTIVE | Noted: 2020-08-27

## 2022-03-20 PROBLEM — R06.09 DYSPNEA ON EXERTION: Status: ACTIVE | Noted: 2021-05-24

## 2022-03-20 PROBLEM — L05.01 PILONIDAL CYST WITH ABSCESS: Status: ACTIVE | Noted: 2021-05-24

## 2022-03-20 PROBLEM — R53.83 FATIGUE: Status: ACTIVE | Noted: 2021-05-24

## 2022-03-20 PROBLEM — I48.0 PAROXYSMAL ATRIAL FIBRILLATION (HCC): Status: ACTIVE | Noted: 2020-01-10

## 2022-03-29 ENCOUNTER — OFFICE VISIT (OUTPATIENT)
Dept: INTERNAL MEDICINE CLINIC | Age: 62
End: 2022-03-29
Payer: COMMERCIAL

## 2022-03-29 VITALS
HEART RATE: 85 BPM | TEMPERATURE: 97.4 F | RESPIRATION RATE: 14 BRPM | DIASTOLIC BLOOD PRESSURE: 72 MMHG | WEIGHT: 183 LBS | BODY MASS INDEX: 27.11 KG/M2 | OXYGEN SATURATION: 97 % | HEIGHT: 69 IN | SYSTOLIC BLOOD PRESSURE: 112 MMHG

## 2022-03-29 DIAGNOSIS — E11.40 TYPE 2 DIABETES MELLITUS WITH DIABETIC NEUROPATHY, WITHOUT LONG-TERM CURRENT USE OF INSULIN (HCC): ICD-10-CM

## 2022-03-29 DIAGNOSIS — I48.0 PAROXYSMAL ATRIAL FIBRILLATION (HCC): ICD-10-CM

## 2022-03-29 DIAGNOSIS — Z72.0 TOBACCO USE: ICD-10-CM

## 2022-03-29 DIAGNOSIS — E11.9 CONTROLLED TYPE 2 DIABETES MELLITUS WITHOUT COMPLICATION, WITHOUT LONG-TERM CURRENT USE OF INSULIN (HCC): ICD-10-CM

## 2022-03-29 DIAGNOSIS — I10 ESSENTIAL HYPERTENSION: ICD-10-CM

## 2022-03-29 DIAGNOSIS — J44.9 CHRONIC OBSTRUCTIVE PULMONARY DISEASE, UNSPECIFIED COPD TYPE (HCC): Primary | ICD-10-CM

## 2022-03-29 DIAGNOSIS — R19.7 DIARRHEA, UNSPECIFIED TYPE: ICD-10-CM

## 2022-03-29 DIAGNOSIS — F32.2 CURRENT SEVERE EPISODE OF MAJOR DEPRESSIVE DISORDER WITHOUT PSYCHOTIC FEATURES WITHOUT PRIOR EPISODE (HCC): ICD-10-CM

## 2022-03-29 PROCEDURE — 99214 OFFICE O/P EST MOD 30 MIN: CPT | Performed by: INTERNAL MEDICINE

## 2022-03-29 RX ORDER — ESCITALOPRAM OXALATE 20 MG/1
20 TABLET ORAL DAILY
Qty: 90 TABLET | Refills: 3 | Status: SHIPPED | OUTPATIENT
Start: 2022-03-29

## 2022-03-29 RX ORDER — LISINOPRIL 10 MG/1
10 TABLET ORAL DAILY
Qty: 90 TABLET | Refills: 3 | Status: SHIPPED | OUTPATIENT
Start: 2022-03-29

## 2022-03-29 RX ORDER — METFORMIN HYDROCHLORIDE 1000 MG/1
1000 TABLET ORAL 2 TIMES DAILY WITH MEALS
Qty: 180 TABLET | Refills: 3 | Status: SHIPPED | OUTPATIENT
Start: 2022-03-29

## 2022-03-29 RX ORDER — ISOPROPYL ALCOHOL 70 ML/100ML
SWAB TOPICAL
Qty: 100 PAD | Refills: 11 | Status: SHIPPED | OUTPATIENT
Start: 2022-03-29

## 2022-03-29 RX ORDER — CLOPIDOGREL BISULFATE 75 MG/1
75 TABLET ORAL DAILY
Qty: 90 TABLET | Refills: 3 | Status: SHIPPED | OUTPATIENT
Start: 2022-03-29

## 2022-03-29 RX ORDER — GABAPENTIN 100 MG/1
CAPSULE ORAL
Qty: 60 CAPSULE | Refills: 5 | Status: SHIPPED | OUTPATIENT
Start: 2022-03-29

## 2022-03-29 RX ORDER — CARVEDILOL 25 MG/1
TABLET ORAL
Qty: 180 TABLET | Refills: 3 | Status: SHIPPED | OUTPATIENT
Start: 2022-03-29

## 2022-03-29 RX ORDER — ALBUTEROL SULFATE 90 UG/1
1 AEROSOL, METERED RESPIRATORY (INHALATION)
Qty: 1 EACH | Refills: 5 | Status: SHIPPED | OUTPATIENT
Start: 2022-03-29

## 2022-03-29 RX ORDER — GABAPENTIN 300 MG/1
CAPSULE ORAL
Qty: 60 CAPSULE | Refills: 5 | Status: SHIPPED | OUTPATIENT
Start: 2022-03-29

## 2022-03-29 RX ORDER — TADALAFIL 20 MG/1
TABLET ORAL
Qty: 15 TABLET | Refills: 11 | Status: SHIPPED | OUTPATIENT
Start: 2022-03-29

## 2022-03-29 RX ORDER — LANCETS 33 GAUGE
EACH MISCELLANEOUS
Qty: 100 EACH | Refills: 11 | Status: SHIPPED | OUTPATIENT
Start: 2022-03-29

## 2022-03-29 RX ORDER — GLIMEPIRIDE 4 MG/1
TABLET ORAL
Qty: 180 TABLET | Refills: 3 | Status: SHIPPED | OUTPATIENT
Start: 2022-03-29 | End: 2022-04-29 | Stop reason: SDUPTHER

## 2022-03-29 RX ORDER — ALBUTEROL SULFATE 90 UG/1
1 AEROSOL, METERED RESPIRATORY (INHALATION)
Qty: 1 EACH | Refills: 5 | Status: SHIPPED | OUTPATIENT
Start: 2022-03-29 | End: 2022-03-29 | Stop reason: SDUPTHER

## 2022-03-29 RX ORDER — ATORVASTATIN CALCIUM 40 MG/1
40 TABLET, FILM COATED ORAL DAILY
Qty: 90 TABLET | Refills: 3 | Status: SHIPPED | OUTPATIENT
Start: 2022-03-29

## 2022-03-29 NOTE — ASSESSMENT & PLAN NOTE
Wheezing and slight rhonchi on exam. Asymptomatic per patient. This was a diagnosis that showed up in his chart after his admission. Has not had formal testing.    Albuterol sent to pharmacy for use as needed

## 2022-03-29 NOTE — ASSESSMENT & PLAN NOTE
Labs done just prior ot appointment. 150-180s in the morning  less than 100 during the day   Night- farxiga, januvia   BID - glimepiride, metformin  We had stopped [place] and [place] last visit due to diarrhea. He did stop the medication but his diarrhea persisted but eventually resolved.  Restarted his [place] and [place]  Continue current medications

## 2022-03-29 NOTE — PROGRESS NOTES
Note   Chief Complaint   Med follow up    Jaspal Kovacs is a 65 y.o. male     Accompanied by his grandson Pastor Dyer    1. Chronic obstructive pulmonary disease, unspecified COPD type ([address]  Assessment & Plan:  Wheezing and slight rhonchi on exam. Asymptomatic per patient. This was a diagnosis that showed up in his chart after his admission. Has not had formal testing. Albuterol sent to pharmacy for use as needed  Orders:  -     albuterol (PROVENTIL HFA, VENTOLIN HFA, PROAIR HFA) 90 mcg/actuation inhaler; Take 1 Puff by inhalation every four (4) hours as needed for Wheezing., Normal, Disp-1 Each, R-5  2. Essential hypertension  Assessment & Plan:   well controlled, continue current medications  Orders:  -     carvediloL (COREG) 25 mg tablet; TAKE ONE TABLET BY MOUTH TWICE A DAY WITH A MEAL, Print, Disp-180 Tablet, R-3  3. Type 2 diabetes mellitus with diabetic neuropathy, without long-term current use of insulin [facility])  Assessment & Plan:  Labs done just prior ot appointment. 150-180s in the morning  less than 100 during the day   Night- farxiga, januvia   BID - glimepiride, metformin  We had stopped [place] last visit due to diarrhea. He did stop the medication but his diarrhea persisted but eventually resolved. Restarted his [place] and [place]  Continue current medications  Orders:  -     gabapentin (NEURONTIN) 100 mg capsule; TAKE TWO CAPSULES BY MOUTH EVERY MORNING, Print, Disp-60 Capsule, R-5  -     SITagliptin (Januvia) 25 mg tablet; Take 1 Tablet by mouth nightly. Indications: type 2 diabetes mellitus, Print, Disp-90 Tablet, R-3  -     gabapentin (NEURONTIN) 300 mg capsule; TAKE 2 CAPSULES BY MOUTH EVERY NIGHT, Print, Disp-60 Capsule, R-5  -     glimepiride (AMARYL) 4 mg tablet; TAKE 1 TABLET BY MOUTH TWO TIMES A DAY, Print, Disp-180 Tablet, R-3  -     dapagliflozin (Farxiga) 10 mg tab tablet; Take 1 Tablet by mouth daily.  Indications: type 2 diabetes mellitus, Print, Disp-90 Tablet, R-3  -     glucose blood VI test strips (ASCENSIA AUTODISC VI, ONE TOUCH ULTRA TEST VI) strip; Check sugar 3 times daily for diabetes, Print, Disp-100 Strip, R-11  4. Current severe episode of major depressive disorder without psychotic features without prior episode [facility])  Assessment & Plan:   well controlled, continue current medications  Orders:  -     escitalopram oxalate (LEXAPRO) 20 mg tablet; Take 1 Tablet by mouth daily. , Print, Disp-90 Tablet, R-3  5. Controlled type 2 diabetes mellitus without complication, without long-term current use of insulin (HCC)  -     metFORMIN (GLUCOPHAGE) 1,000 mg tablet; Take 1 Tablet by mouth two (2) times daily (with meals). , Print, Disp-180 Tablet, R-3  -     lisinopriL (PRINIVIL, ZESTRIL) 10 mg tablet; Take 1 Tablet by mouth daily. Indications: high blood pressure, Print, Disp-90 Tablet, R-3  -     atorvastatin (LIPITOR) 40 mg tablet; Take 1 Tablet by mouth daily. Indications: high cholesterol, Print, Disp-90 Tablet, R-3  -     alcohol swabs padm; Use 1-3 times daily to monitor sugar, Print, Disp-100 Pad, R-11  6. Paroxysmal atrial fibrillation (HCC)  Assessment & Plan:   asymptomatic, continue current medications  Orders:  -     apixaban (Eliquis) 5 mg tablet; Take 1 Tablet by mouth two (2) times a day. Indications: treatment to prevent blood clots in chronic atrial fibrillation, Print, Disp-180 Tablet, R-3  7. Tobacco use  Assessment & Plan:  Planning to quit soon! Due to cost of cigarettes in [place]  8. Diarrhea, unspecified type  Assessment & Plan:  Resolved. Was not due to [place] and [place]         Benefits, risks, possible drug interactions, and side effects of all new medications were reviewed with the patient. Pt verbalized understanding. Return to clinic:  Pt planning to move to [facility]    An electronic signature was used to authenticate this note.   Dave Alvarez MD  [facility]  3/29/2022    Future Appointments   Date Time Provider Ximena Varghese   4/5/2022 3:30 PM [facility]        Objective   Vitals:       Visit Vitals  /72 (BP 1 Location: Left upper arm, BP Patient Position: Sitting, BP Cuff Size: Adult)   Pulse 85   Temp 97.4 °F (36.3 °C) (Oral)   Resp 14   Ht 5' 9\" (1.753 m)   Wt 183 lb (83 kg)   SpO2 97%   BMI 27.02 kg/m²        Physical Exam  Constitutional:       Appearance: Normal appearance. He is not ill-appearing. Cardiovascular:      Rate and Rhythm: Normal rate and regular rhythm. Heart sounds: No murmur heard. No friction rub. No gallop. Pulmonary:      Effort: No respiratory distress. Breath sounds: Wheezing and rhonchi present. No rales. Comments: occl expiratory wheezing and coarse rhonchi  Neurological:      Mental Status: He is alert. Current Outpatient Medications   Medication Sig    lancets (OneTouch Delica Plus Lancet) 33 gauge misc USE TO CHECK BLOOD SUGAR ONE TO THREE TIMES DAILY    carvediloL (COREG) 25 mg tablet TAKE ONE TABLET BY MOUTH TWICE A DAY WITH A MEAL    gabapentin (NEURONTIN) 100 mg capsule TAKE TWO CAPSULES BY MOUTH EVERY MORNING    SITagliptin (Januvia) 25 mg tablet Take 1 Tablet by mouth nightly. Indications: type 2 diabetes mellitus    gabapentin (NEURONTIN) 300 mg capsule TAKE 2 CAPSULES BY MOUTH EVERY NIGHT    glimepiride (AMARYL) 4 mg tablet TAKE 1 TABLET BY MOUTH TWO TIMES A DAY    escitalopram oxalate (LEXAPRO) 20 mg tablet Take 1 Tablet by mouth daily.  metFORMIN (GLUCOPHAGE) 1,000 mg tablet Take 1 Tablet by mouth two (2) times daily (with meals).  apixaban (Eliquis) 5 mg tablet Take 1 Tablet by mouth two (2) times a day. Indications: treatment to prevent blood clots in chronic atrial fibrillation    dapagliflozin (Farxiga) 10 mg tab tablet Take 1 Tablet by mouth daily. Indications: type 2 diabetes mellitus    clopidogreL (PLAVIX) 75 mg tab Take 1 Tablet by mouth daily.     glucose blood VI test strips (ASCENSIA AUTODISC VI, ONE TOUCH ULTRA TEST VI) strip Check sugar 3 times daily for diabetes    lisinopriL (PRINIVIL, ZESTRIL) 10 mg tablet Take 1 Tablet by mouth daily. Indications: high blood pressure    tadalafiL (CIALIS) 20 mg tablet TAKE ONE TABLET BY MOUTH DAILY AS NEEDED FOR ERECTILE DYSFUNCTION    atorvastatin (LIPITOR) 40 mg tablet Take 1 Tablet by mouth daily. Indications: high cholesterol    alcohol swabs padm Use 1-3 times daily to monitor sugar    albuterol (PROVENTIL HFA, VENTOLIN HFA, PROAIR HFA) 90 mcg/actuation inhaler Take 1 Puff by inhalation every four (4) hours as needed for Wheezing.  Blood-Glucose Meter (Contour Next Meter) misc Use 1-4 times daily to measure glucose    lancets misc Use 1-3 times daily to monitor sugar    terbinafine HCL (LAMISIL) 250 mg tablet Take 250 mg by mouth daily.  multivitamin (ONE A DAY) tablet Take 1 Tab by mouth daily.  Insulin Needles, Disposable, (Lite Touch Insulin Pen Needles) 31 gauge x 1/4\" ndle 30 Pen Needle by Does Not Apply route nightly. (Patient not taking: Reported on 1/28/2022)     No current facility-administered medications for this visit.

## 2022-03-30 LAB
ALBUMIN SERPL-MCNC: 4.4 G/DL (ref 3.8–4.8)
ALBUMIN/CREAT UR: <5 MG/G CREAT (ref 0–29)
ALBUMIN/GLOB SERPL: 2.2 {RATIO} (ref 1.2–2.2)
ALP SERPL-CCNC: 74 IU/L (ref 44–121)
ALT SERPL-CCNC: 16 IU/L (ref 0–44)
AST SERPL-CCNC: 11 IU/L (ref 0–40)
BILIRUB SERPL-MCNC: 0.3 MG/DL (ref 0–1.2)
BUN SERPL-MCNC: 16 MG/DL (ref 8–27)
BUN/CREAT SERPL: 11 (ref 10–24)
CALCIUM SERPL-MCNC: 9.7 MG/DL (ref 8.6–10.2)
CHLORIDE SERPL-SCNC: 102 MMOL/L (ref 96–106)
CHOLEST SERPL-MCNC: 113 MG/DL (ref 100–199)
CO2 SERPL-SCNC: 21 MMOL/L (ref 20–29)
CREAT SERPL-MCNC: 1.41 MG/DL (ref 0.76–1.27)
CREAT UR-MCNC: 65.5 MG/DL
EGFR: 57 ML/MIN/1.73
ERYTHROCYTE [DISTWIDTH] IN BLOOD BY AUTOMATED COUNT: 13 % (ref 11.6–15.4)
EST. AVERAGE GLUCOSE BLD GHB EST-MCNC: 183 MG/DL
GLOBULIN SER CALC-MCNC: 2 G/DL (ref 1.5–4.5)
GLUCOSE SERPL-MCNC: 244 MG/DL (ref 65–99)
HBA1C MFR BLD: 8 % (ref 4.8–5.6)
HCT VFR BLD AUTO: 46.8 % (ref 37.5–51)
HDLC SERPL-MCNC: 22 MG/DL
HGB BLD-MCNC: 15.4 G/DL (ref 13–17.7)
IMP & REVIEW OF LAB RESULTS: NORMAL
INTERPRETATION: NORMAL
LDLC SERPL CALC-MCNC: 38 MG/DL (ref 0–99)
MCH RBC QN AUTO: 28.8 PG (ref 26.6–33)
MCHC RBC AUTO-ENTMCNC: 32.9 G/DL (ref 31.5–35.7)
MCV RBC AUTO: 88 FL (ref 79–97)
MICROALBUMIN UR-MCNC: <3 UG/ML
PLATELET # BLD AUTO: 234 X10E3/UL (ref 150–450)
POTASSIUM SERPL-SCNC: 4.6 MMOL/L (ref 3.5–5.2)
PROT SERPL-MCNC: 6.4 G/DL (ref 6–8.5)
PSA SERPL-MCNC: 0.4 NG/ML (ref 0–4)
RBC # BLD AUTO: 5.35 X10E6/UL (ref 4.14–5.8)
REFLEX CRITERIA: NORMAL
SODIUM SERPL-SCNC: 138 MMOL/L (ref 134–144)
TRIGL SERPL-MCNC: 362 MG/DL (ref 0–149)
VLDLC SERPL CALC-MCNC: 53 MG/DL (ref 5–40)
WBC # BLD AUTO: 8.2 X10E3/UL (ref 3.4–10.8)

## 2022-03-31 DIAGNOSIS — E11.40 TYPE 2 DIABETES MELLITUS WITH DIABETIC NEUROPATHY, WITHOUT LONG-TERM CURRENT USE OF INSULIN (HCC): ICD-10-CM

## 2022-03-31 NOTE — PROGRESS NOTES
[employer] message sent. Creatinine 1.41 from baseline 1.1. CMP otherwise normal.  CBC normal.  LDL 38. HDL 22. Triglyceride 362. No microalbuminuria. A1c 8% PSA normal    A1c higher than expected given readings he's getting. Overnight hyperglycemia?   Rec increasing januvia to 50 from 25, on max dose of other meds already  Follow up with doc in [place]

## 2022-04-05 ENCOUNTER — TELEPHONE (OUTPATIENT)
Dept: INTERNAL MEDICINE CLINIC | Age: 62
End: 2022-04-05

## 2022-04-05 NOTE — TELEPHONE ENCOUNTER
Call made to patient to go over labs and recommendations from pcp. Patient will need a paper copy of Januvia for future refills to take with him to [place]. Please provider patient will  tomorrow as he leave Friday.

## 2022-04-11 DIAGNOSIS — E11.40 TYPE 2 DIABETES MELLITUS WITH DIABETIC NEUROPATHY, WITHOUT LONG-TERM CURRENT USE OF INSULIN (HCC): ICD-10-CM

## 2022-04-26 ENCOUNTER — DOCUMENTATION ONLY (OUTPATIENT)
Dept: INTERNAL MEDICINE CLINIC | Age: 62
End: 2022-04-26

## 2022-04-26 NOTE — PROGRESS NOTES
PA completed by nurse via covermymeds. PA pending insurance approval please see below for PA richter. (Angelito Catching [id number]) - [id number] help?  Call us at [phone]  Status  Sent to [employer]  Drug  Glimepiride 4MG tablets

## 2022-04-29 ENCOUNTER — PATIENT MESSAGE (OUTPATIENT)
Dept: INTERNAL MEDICINE CLINIC | Age: 62
End: 2022-04-29

## 2022-04-29 DIAGNOSIS — E11.40 TYPE 2 DIABETES MELLITUS WITH DIABETIC NEUROPATHY, WITHOUT LONG-TERM CURRENT USE OF INSULIN (HCC): ICD-10-CM

## 2022-04-29 NOTE — TELEPHONE ENCOUNTER
From: Aminah Petersen  Sent: 4/29/2022 9:58 AM EDT  To: [facility] Nurses Pool  Subject: Glimepiride    [facility] in [place], [place]    Thank you

## 2022-05-01 RX ORDER — GLIMEPIRIDE 4 MG/1
TABLET ORAL
Qty: 180 TABLET | Refills: 3 | Status: SHIPPED | OUTPATIENT
Start: 2022-05-01

## 2023-01-06 DIAGNOSIS — J44.9 CHRONIC OBSTRUCTIVE PULMONARY DISEASE, UNSPECIFIED COPD TYPE (HCC): ICD-10-CM

## 2023-01-06 RX ORDER — ALBUTEROL SULFATE 90 UG/1
1 AEROSOL, METERED RESPIRATORY (INHALATION)
Qty: 1 EACH | Refills: 5 | Status: SHIPPED | OUTPATIENT
Start: 2023-01-06

## 2023-02-01 ENCOUNTER — DOCUMENTATION ONLY (OUTPATIENT)
Dept: INTERNAL MEDICINE CLINIC | Age: 63
End: 2023-02-01

## 2023-02-01 NOTE — PROGRESS NOTES
PA request received  when trying to complete nurse received message from insurance. Viola SLOAN Case ID: [id number]    We received a prior authorization request for the member and product listed above. The [facility] Prior Authorization Team is not able to review this request because the requested medication has been previously approved under [id number]. Based on the information reviewed, the requested prescription is currently authorized for coverage by the plan until 2023-12-01. Please resubmit this request within 30 days of authorization expiration date.   Drug  OneTouch Ultra strips

## 2023-11-06 ENCOUNTER — TELEPHONE (OUTPATIENT)
Age: 63
End: 2023-11-06

## 2023-11-06 NOTE — TELEPHONE ENCOUNTER
He will like a call back to ask about a procedure that was done to his leg in our office building he stated. He need to to collet information [phone]

## 2023-11-07 ENCOUNTER — TELEPHONE (OUTPATIENT)
Age: 63
End: 2023-11-07

## 2023-11-07 NOTE — TELEPHONE ENCOUNTER
Incoming call from patient wanted to know his vascular provider name. Nurse did not have that information for patient but gave number to vascular associates. Patient was thankful.

## (undated) DEVICE — DRAPE PRB US TRNSDCR 6X96IN --

## (undated) DEVICE — CORONARY IMAGING CATHETER: Brand: OPTICROSS™ 6 HD

## (undated) DEVICE — SPLINT WR POS F/ARTERIAL ACC -- BX/10

## (undated) DEVICE — KIT ANGIOGRAPHY CUST MRMC

## (undated) DEVICE — TUBING PRSS MON L6IN PVC M FEM CONN

## (undated) DEVICE — RADIFOCUS OPTITORQUE ANGIOGRAPHIC CATHETER: Brand: OPTITORQUE

## (undated) DEVICE — SYRINGE ANGIO 10 CC BRL STD PRNT POLYCARB LT BLU MEDALLION

## (undated) DEVICE — TR BAND RADIAL ARTERY COMPRESSION DEVICE: Brand: TR BAND

## (undated) DEVICE — CATH ANGI BLLN DIL 4.0X15MM -- NC EUPHORA

## (undated) DEVICE — GLIDESHEATH SLENDER ACCESS KIT: Brand: GLIDESHEATH SLENDER

## (undated) DEVICE — SYR ART 700 CLEAR MARK 7 -- ARTERION

## (undated) DEVICE — ADULT SPO2 SENSOR: Brand: NELLCOR

## (undated) DEVICE — TREK CORONARY DILATATION CATHETER 3.50 MM X 15 MM / RAPID-EXCHANGE: Brand: TREK

## (undated) DEVICE — 3M™ TEGADERM™ TRANSPARENT FILM DRESSING FRAME STYLE, 1626W, 4 IN X 4-3/4 IN (10 CM X 12 CM), 50/CT 4CT/CASE: Brand: 3M™ TEGADERM™

## (undated) DEVICE — 40 MHZ CORONARY IMAGING CATHETER: Brand: OPTICROSS

## (undated) DEVICE — PACK PROCEDURE SURG HRT CATH

## (undated) DEVICE — GUIDEWIRE VASC L260CM 0.035IN J TIP L3MM PTFE FIX COR NAMIC

## (undated) DEVICE — CATHETER ETER ANGIO L110CM OD5FR ID046IN L75CM 038IN 145DEG CARD

## (undated) DEVICE — HI-TORQUE VERSACORE FLOPPY GUIDE WIRE SYSTEM 145 CM: Brand: HI-TORQUE VERSACORE

## (undated) DEVICE — CATH GUID COR EB35 6FR 100CM -- LAUNCHER